# Patient Record
Sex: FEMALE | Race: WHITE | NOT HISPANIC OR LATINO | Employment: UNEMPLOYED | URBAN - METROPOLITAN AREA
[De-identification: names, ages, dates, MRNs, and addresses within clinical notes are randomized per-mention and may not be internally consistent; named-entity substitution may affect disease eponyms.]

---

## 2023-03-13 ENCOUNTER — NURSING HOME VISIT (OUTPATIENT)
Dept: POST ACUTE CARE | Facility: EXTERNAL LOCATION | Age: 68
End: 2023-03-13
Payer: MEDICARE

## 2023-03-13 DIAGNOSIS — I95.1 ORTHOSTATIC HYPOTENSION: ICD-10-CM

## 2023-03-13 DIAGNOSIS — M15.9 OSTEOARTHRITIS OF MULTIPLE JOINTS, UNSPECIFIED OSTEOARTHRITIS TYPE: ICD-10-CM

## 2023-03-13 DIAGNOSIS — R53.81 PHYSICAL DECONDITIONING: ICD-10-CM

## 2023-03-13 DIAGNOSIS — K21.9 GASTROESOPHAGEAL REFLUX DISEASE WITHOUT ESOPHAGITIS: ICD-10-CM

## 2023-03-13 DIAGNOSIS — E11.49 OTHER DIABETIC NEUROLOGICAL COMPLICATION ASSOCIATED WITH TYPE 2 DIABETES MELLITUS (MULTI): Primary | ICD-10-CM

## 2023-03-13 DIAGNOSIS — E11.69 TYPE 2 DIABETES MELLITUS WITH OTHER SPECIFIED COMPLICATION, WITH LONG-TERM CURRENT USE OF INSULIN (MULTI): ICD-10-CM

## 2023-03-13 DIAGNOSIS — Z79.4 TYPE 2 DIABETES MELLITUS WITH OTHER SPECIFIED COMPLICATION, WITH LONG-TERM CURRENT USE OF INSULIN (MULTI): ICD-10-CM

## 2023-03-13 DIAGNOSIS — E78.00 HYPERCHOLESTEREMIA: ICD-10-CM

## 2023-03-13 DIAGNOSIS — R42 DIZZINESS: ICD-10-CM

## 2023-03-13 DIAGNOSIS — F32.A DEPRESSION, UNSPECIFIED DEPRESSION TYPE: ICD-10-CM

## 2023-03-13 DIAGNOSIS — F41.9 ANXIETY: ICD-10-CM

## 2023-03-13 DIAGNOSIS — Z86.73 HISTORY OF CVA (CEREBROVASCULAR ACCIDENT): ICD-10-CM

## 2023-03-13 DIAGNOSIS — E87.6 HYPOKALEMIA: ICD-10-CM

## 2023-03-13 DIAGNOSIS — J45.20 INTERMITTENT ASTHMA, UNSPECIFIED ASTHMA SEVERITY, UNSPECIFIED WHETHER COMPLICATED (HHS-HCC): ICD-10-CM

## 2023-03-13 PROCEDURE — 99310 SBSQ NF CARE HIGH MDM 45: CPT | Performed by: NURSE PRACTITIONER

## 2023-03-13 NOTE — LETTER
"Patient: Lenora Ames  : 1955    Encounter Date: 2023    Chief Complaint:   SNF follow-up  Generalized weakness/impaired ambulation 2/2 dizziness    HPI:   67-year-old female presenting to [facility] ER on 3/5 with c/o dizziness x 4 days. Patient reported generalized weakness and inability to walk 2/2 dizziness. She has a hx of vertigo and has been taking her meclizine without significant relief. Work-up in ER: /80, , RR 18, T 36.4, SpO2 94% on RA, WBC 14.9, Hgb 16.7, Hematocrit 49.3, Na+ 131, K+ 3.6, BUN 28, Cr 0.79, UA negative for UTI, troponin 14 >>12, COVID/Flu negative, BNP 27, CXR negative, CTH showing infarct in the L frontal and L occipital lobes (new from 21), but otherwise chronic appearing. Patient was given Meclizine, Zofran, and IV NS bolus. She was admitted to the hospital for further evaluation and treatment. Hospital course:       Dizziness with orthostatic hypotension-IVF, orthostatic BP BID, MRI/MRA negative for acute stroke, ECHO w/ normal LVSF with an EF of 60-65% w/ impaired relaxation pattern for LVDF, c/w aspirin and atorvastatin, zofran prn, meclizine scheduled, c/w midodrine, ENT consult, cardio consult  Physical deconditioning 2/2 #1-PT/OT eval, recommending SNF  Hypokalemia-monitored  DM2 w/ hyperglycemia-accuchecks, mild ISS, lantus, HgbA1C 10.3  Hyponatremia 2/2 hyperglycemia-IVF, monitored  Depression/anxiety/mood disorder-c/w depakote, duloxetine, and lexapro  GERD-c/w PPI and carafate    Patient was HDS and discharged to [facility] on 3/9/23. Patient was transferred to the ER on 3/11 after she fell onto her R arm and had c/o right arm pain. Work-up in ER was negative for fracture and she was discharged back to [place]. Today, patient reports that she is feeling \"okay\". She reports RUE pain s/p fall, improved w/ Tylenol. She reports dizziness when she stands up. She denies any vision changes, SOB, cough, or chest pain. She reports appetite at " baseline. Staff report no clinical concerns.     ROS:    As above in HPI. Otherwise, all other systems have been reviewed and are negative for complaint.    Medications reviewed and verified in NH chart.     Patient Active Problem List   Diagnosis   • Bronchial asthma   • Cervical dysphagia   • Depression   • Diabetes mellitus (CMS/HCC)   • Diabetic neuropathy (CMS/HCC)   • Hypercholesteremia   • Mild obesity   • Rotator cuff syndrome of right shoulder   • Other constipation   • Osteoarthritis of multiple joints   • Chronic neck pain   • Dizziness   • History of CVA (cerebrovascular accident)   • Orthostatic hypotension   • Diastolic dysfunction   • Hypokalemia   • Hyponatremia   • Anxiety   • Gastroesophageal reflux disease without esophagitis        Past Medical History:   Diagnosis Date   • Esophagitis    • Gastroenteritis    • Other conditions influencing health status     Disc herniation   • Pneumonia 03/25/2023   • Proteinuria, unspecified     Proteinuria   • Yeast infection 03/25/2023       Past Surgical History:   Procedure Laterality Date   • MR HEAD ANGIO WO IV CONTRAST  03/11/2014    MR HEAD ANGIO WO IV CONTRAST 3/11/2014 [facility]   • MR HEAD ANGIO WO IV CONTRAST  04/19/2021    MR HEAD ANGIO WO IV CONTRAST 4/19/2021 [facility]   • MR HEAD ANGIO WO IV CONTRAST  03/06/2023    MR HEAD ANGIO WO IV CONTRAST GEN MRI   • MR NECK ANGIO WO IV CONTRAST  03/11/2014    MR NECK ANGIO WO IV CONTRAST 3/11/2014 [facility]   • MR NECK ANGIO WO IV CONTRAST  04/19/2021    MR NECK ANGIO WO IV CONTRAST 4/19/2021 [facility]   • MR NECK ANGIO WO IV CONTRAST  03/06/2023    MR NECK ANGIO WO IV CONTRAST GEN MRI   • OTHER SURGICAL HISTORY      excision of multiple cysts       Family History   Problem Relation Name Age of Onset   • Aneurysm Mother     • Lung cancer Father     • Ovarian cancer Other         Social History     Tobacco Use   Smoking Status Every Day   • Packs/day: 0.50   • Types:  Cigarettes   Smokeless Tobacco Never   Vaping Use   Vaping Status Not on file       Social History     Substance and Sexual Activity   Alcohol Use Yes    Comment: occasional       Social History     Substance and Sexual Activity   Drug Use Yes   • Types: Marijuana    Comment: daily use       Allergies   Allergen Reactions   • Rofecoxib Unknown        Vital Signs:   141/68-93-18-98.1-97% on RA     Physical Exam:  General: Sitting up in bed in NAD, alert   Head/Face: NCAT, symmetrical  Eyes: PERRLA, no injection, no discharge  ENT: Hearing not impaired, ears without scars or lesions, nasal mucosa and turbinates pink, septum midline, lips pink and moist  Neck: Supple, symmetrical  Respiratory: CTA without adventitious sounds, even and nonlabored without use of accessory muscles, good air exchange  Cardio: RRR without murmur or gallops, normal S1S2, no edema, pedal pulses 3+/4 bilaterally  Chest/Breast: Symmetrical  GI: BS x 4, normoactive, non-distended, abd round and soft, no masses or tenderness  : No suprapubic tenderness or distention  MSK: Gait not assessed, joints with full ROM without pain or contractures  Skin: Skin warm and dry, no induration  Neurologic: Cranial nerves II through XII intact, superficial touch and pain sensation intact  Psychiatric: Alert to person, place, and time, calm and cooperative, can be impulsive at times    Results/Data:   Lab Results   Component Value Date    WBC 8.1 03/09/2023    HGB 12.5 03/09/2023    HCT 37.8 03/09/2023     03/09/2023    CHOL 118 03/06/2023    TRIG 301 (H) 03/06/2023    HDL 34.0 (A) 03/06/2023    ALT 12 03/05/2023    AST 32 03/05/2023     03/09/2023    K 4.2 03/09/2023     03/09/2023    CREATININE 0.66 03/09/2023    BUN 8 03/09/2023    CO2 29 03/09/2023    INR 1.0 03/05/2023    HGBA1C 10.3 (A) 03/06/2023     Assessment/Plan:    1.  Dizziness/history of vertigo/orthostatic hypotension-encourage p.o. intake, continue with midodrine, hold for SBP  greater than 120, monitor BP and HR closely, meclizine prn, patient will need to have a hearing test scheduled as well as a VNG and ECOG, patient to follow-up with ENT (Dr. Jefferson) after testing is completed  2.  Physical deconditioning-continue with PT/OT, safety and fall precautions, ambulate with assistance only  3. Hypokalemia-monitor K+ level, replete as needed  4. GERD-c/w protonix and carafate  5. Hyponatremia-monitor BMP  6. Depression/anxiety/mood disorder-c/w depakote, duloxetine, and lexapro, monitor behaviors, supportive care, Psych to follow  7. DM2-LCS diet, accuchecks, c/w humalog s/s with meals, glargine and trulicty, monitor HgbA1C, diabetic foot care, yearly eye exam, BP and lipid control  8. Diabetic neuropathy-c/w gabapentin  9. HLD/Hx CVA-c/w aspirin and atorvastatin, monitor lipid panel and LFTs  10. RUE pain/OA-Tylenol arthritis 650 mg PO BID, encourage ROM, c/w flexeril  11. Hx asthma-albuterol prn    Orders:  1.  Hold midodrine for SBP greater than 120  2.  Schedule Tylenol arthritis 650 mg PO BID     Code Status:   Full code    Time spent reviewing patient's chart on the unit (PMH, PSH, FH, Social Hx AND progress and/or consult notes, labs, and radiology results): 25 minutes  Time spent interviewing and/or examining the patient: 10 minutes  Time spent writing note on the unit: 6 minutes  Time spent reviewing POC w/ patient, family, and/or staff: 6 minutes   Total visit time: 47 minutes . Greater than 50% of time was spent on counseling and/or coordination of care of the patient. Start time: 1:08 PM , End time: 1:55 PM.                Electronically Signed By: LINK Amaral   3/25/23  1:36 PM

## 2023-03-25 PROBLEM — R13.19 CERVICAL DYSPHAGIA: Status: ACTIVE | Noted: 2023-03-25

## 2023-03-25 PROBLEM — M54.2 CHRONIC NECK PAIN: Status: ACTIVE | Noted: 2023-03-25

## 2023-03-25 PROBLEM — K21.9 GASTROESOPHAGEAL REFLUX DISEASE WITHOUT ESOPHAGITIS: Status: ACTIVE | Noted: 2023-03-25

## 2023-03-25 PROBLEM — E87.6 HYPOKALEMIA: Status: ACTIVE | Noted: 2023-03-25

## 2023-03-25 PROBLEM — I51.89 DIASTOLIC DYSFUNCTION: Status: ACTIVE | Noted: 2023-03-25

## 2023-03-25 PROBLEM — F32.A DEPRESSION: Status: ACTIVE | Noted: 2023-03-25

## 2023-03-25 PROBLEM — K59.09 OTHER CONSTIPATION: Status: ACTIVE | Noted: 2023-03-25

## 2023-03-25 PROBLEM — R42 DIZZINESS: Status: ACTIVE | Noted: 2023-03-25

## 2023-03-25 PROBLEM — E78.00 HYPERCHOLESTEREMIA: Status: ACTIVE | Noted: 2023-03-25

## 2023-03-25 PROBLEM — E11.40 DIABETIC NEUROPATHY (MULTI): Status: ACTIVE | Noted: 2023-03-25

## 2023-03-25 PROBLEM — I95.1 ORTHOSTATIC HYPOTENSION: Status: ACTIVE | Noted: 2023-03-25

## 2023-03-25 PROBLEM — G89.29 CHRONIC NECK PAIN: Status: ACTIVE | Noted: 2023-03-25

## 2023-03-25 PROBLEM — B37.9 YEAST INFECTION: Status: RESOLVED | Noted: 2023-03-25 | Resolved: 2023-03-25

## 2023-03-25 PROBLEM — M75.101 ROTATOR CUFF SYNDROME OF RIGHT SHOULDER: Status: ACTIVE | Noted: 2023-03-25

## 2023-03-25 PROBLEM — M15.9 OSTEOARTHRITIS OF MULTIPLE JOINTS: Status: ACTIVE | Noted: 2023-03-25

## 2023-03-25 PROBLEM — J18.9 PNEUMONIA: Status: RESOLVED | Noted: 2023-03-25 | Resolved: 2023-03-25

## 2023-03-25 PROBLEM — E11.9 DIABETES MELLITUS (MULTI): Status: ACTIVE | Noted: 2023-03-25

## 2023-03-25 PROBLEM — E66.9 MILD OBESITY: Status: ACTIVE | Noted: 2023-03-25

## 2023-03-25 PROBLEM — J45.909 BRONCHIAL ASTHMA (HHS-HCC): Status: ACTIVE | Noted: 2023-03-25

## 2023-03-25 PROBLEM — Z86.73 HISTORY OF CVA (CEREBROVASCULAR ACCIDENT): Status: ACTIVE | Noted: 2023-03-25

## 2023-03-25 PROBLEM — E87.1 HYPONATREMIA: Status: ACTIVE | Noted: 2023-03-25

## 2023-03-25 PROBLEM — F41.9 ANXIETY: Status: ACTIVE | Noted: 2023-03-25

## 2023-03-25 NOTE — PROGRESS NOTES
"Chief Complaint:   SNF follow-up  Generalized weakness/impaired ambulation 2/2 dizziness    HPI:   67-year-old female presenting to [facility] ER on 3/5 with c/o dizziness x 4 days. Patient reported generalized weakness and inability to walk 2/2 dizziness. She has a hx of vertigo and has been taking her meclizine without significant relief. Work-up in ER: /80, , RR 18, T 36.4, SpO2 94% on RA, WBC 14.9, Hgb 16.7, Hematocrit 49.3, Na+ 131, K+ 3.6, BUN 28, Cr 0.79, UA negative for UTI, troponin 14 >>12, COVID/Flu negative, BNP 27, CXR negative, CTH showing infarct in the L frontal and L occipital lobes (new from 4/7/21), but otherwise chronic appearing. Patient was given Meclizine, Zofran, and IV NS bolus. She was admitted to the hospital for further evaluation and treatment. Hospital course:       Dizziness with orthostatic hypotension-IVF, orthostatic BP BID, MRI/MRA negative for acute stroke, ECHO w/ normal LVSF with an EF of 60-65% w/ impaired relaxation pattern for LVDF, c/w aspirin and atorvastatin, zofran prn, meclizine scheduled, c/w midodrine, ENT consult, cardio consult  Physical deconditioning 2/2 #1-PT/OT eval, recommending SNF  Hypokalemia-monitored  DM2 w/ hyperglycemia-accuchecks, mild ISS, lantus, HgbA1C 10.3  Hyponatremia 2/2 hyperglycemia-IVF, monitored  Depression/anxiety/mood disorder-c/w depakote, duloxetine, and lexapro  GERD-c/w PPI and carafate    Patient was HDS and discharged to [facility] on 3/9/23. Patient was transferred to the ER on 3/11 after she fell onto her R arm and had c/o right arm pain. Work-up in ER was negative for fracture and she was discharged back to [place]. Today, patient reports that she is feeling \"okay\". She reports RUE pain s/p fall, improved w/ Tylenol. She reports dizziness when she stands up. She denies any vision changes, SOB, cough, or chest pain. She reports appetite at baseline. Staff report no clinical concerns.     ROS:    As above in HPI. " Otherwise, all other systems have been reviewed and are negative for complaint.    Medications reviewed and verified in NH chart.     Patient Active Problem List   Diagnosis    Bronchial asthma    Cervical dysphagia    Depression    Diabetes mellitus (CMS/HCC)    Diabetic neuropathy (CMS/HCC)    Hypercholesteremia    Mild obesity    Rotator cuff syndrome of right shoulder    Other constipation    Osteoarthritis of multiple joints    Chronic neck pain    Dizziness    History of CVA (cerebrovascular accident)    Orthostatic hypotension    Diastolic dysfunction    Hypokalemia    Hyponatremia    Anxiety    Gastroesophageal reflux disease without esophagitis        Past Medical History:   Diagnosis Date    Esophagitis     Gastroenteritis     Other conditions influencing health status     Disc herniation    Pneumonia 03/25/2023    Proteinuria, unspecified     Proteinuria    Yeast infection 03/25/2023       Past Surgical History:   Procedure Laterality Date    MR HEAD ANGIO WO IV CONTRAST  03/11/2014    MR HEAD ANGIO WO IV CONTRAST 3/11/2014 [facility]    MR HEAD ANGIO WO IV CONTRAST  04/19/2021    MR HEAD ANGIO WO IV CONTRAST 4/19/2021 [facility]    MR HEAD ANGIO WO IV CONTRAST  03/06/2023    MR HEAD ANGIO WO IV CONTRAST GEN MRI    MR NECK ANGIO WO IV CONTRAST  03/11/2014    MR NECK ANGIO WO IV CONTRAST 3/11/2014 [facility]    MR NECK ANGIO WO IV CONTRAST  04/19/2021    MR NECK ANGIO WO IV CONTRAST 4/19/2021 [facility]    MR NECK ANGIO WO IV CONTRAST  03/06/2023    MR NECK ANGIO WO IV CONTRAST GEN MRI    OTHER SURGICAL HISTORY      excision of multiple cysts       Family History   Problem Relation Name Age of Onset    Aneurysm Mother      Lung cancer Father      Ovarian cancer Other         Social History     Tobacco Use   Smoking Status Every Day    Packs/day: 0.50    Types: Cigarettes   Smokeless Tobacco Never   Vaping Use   Vaping Status Not on file       Social History     Substance and  Sexual Activity   Alcohol Use Yes    Comment: occasional       Social History     Substance and Sexual Activity   Drug Use Yes    Types: Marijuana    Comment: daily use       Allergies   Allergen Reactions    Rofecoxib Unknown        Vital Signs:   141/68-93-18-98.1-97% on RA     Physical Exam:  General: Sitting up in bed in NAD, alert   Head/Face: NCAT, symmetrical  Eyes: PERRLA, no injection, no discharge  ENT: Hearing not impaired, ears without scars or lesions, nasal mucosa and turbinates pink, septum midline, lips pink and moist  Neck: Supple, symmetrical  Respiratory: CTA without adventitious sounds, even and nonlabored without use of accessory muscles, good air exchange  Cardio: RRR without murmur or gallops, normal S1S2, no edema, pedal pulses 3+/4 bilaterally  Chest/Breast: Symmetrical  GI: BS x 4, normoactive, non-distended, abd round and soft, no masses or tenderness  : No suprapubic tenderness or distention  MSK: Gait not assessed, joints with full ROM without pain or contractures  Skin: Skin warm and dry, no induration  Neurologic: Cranial nerves II through XII intact, superficial touch and pain sensation intact  Psychiatric: Alert to person, place, and time, calm and cooperative, can be impulsive at times    Results/Data:   Lab Results   Component Value Date    WBC 8.1 03/09/2023    HGB 12.5 03/09/2023    HCT 37.8 03/09/2023     03/09/2023    CHOL 118 03/06/2023    TRIG 301 (H) 03/06/2023    HDL 34.0 (A) 03/06/2023    ALT 12 03/05/2023    AST 32 03/05/2023     03/09/2023    K 4.2 03/09/2023     03/09/2023    CREATININE 0.66 03/09/2023    BUN 8 03/09/2023    CO2 29 03/09/2023    INR 1.0 03/05/2023    HGBA1C 10.3 (A) 03/06/2023     Assessment/Plan:    1.  Dizziness/history of vertigo/orthostatic hypotension-encourage p.o. intake, continue with midodrine, hold for SBP greater than 120, monitor BP and HR closely, meclizine prn, patient will need to have a hearing test scheduled as well  as a VNG and ECOG, patient to follow-up with ENT (Dr. Jefferson) after testing is completed  2.  Physical deconditioning-continue with PT/OT, safety and fall precautions, ambulate with assistance only  3. Hypokalemia-monitor K+ level, replete as needed  4. GERD-c/w protonix and carafate  5. Hyponatremia-monitor BMP  6. Depression/anxiety/mood disorder-c/w depakote, duloxetine, and lexapro, monitor behaviors, supportive care, Psych to follow  7. DM2-LCS diet, accuchecks, c/w humalog s/s with meals, glargine and trulicty, monitor HgbA1C, diabetic foot care, yearly eye exam, BP and lipid control  8. Diabetic neuropathy-c/w gabapentin  9. HLD/Hx CVA-c/w aspirin and atorvastatin, monitor lipid panel and LFTs  10. RUE pain/OA-Tylenol arthritis 650 mg PO BID, encourage ROM, c/w flexeril  11. Hx asthma-albuterol prn    Orders:  1.  Hold midodrine for SBP greater than 120  2.  Schedule Tylenol arthritis 650 mg PO BID     Code Status:   Full code    Time spent reviewing patient's chart on the unit (PMH, PSH, FH, Social Hx AND progress and/or consult notes, labs, and radiology results): 25 minutes  Time spent interviewing and/or examining the patient: 10 minutes  Time spent writing note on the unit: 6 minutes  Time spent reviewing POC w/ patient, family, and/or staff: 6 minutes   Total visit time: 47 minutes . Greater than 50% of time was spent on counseling and/or coordination of care of the patient. Start time: 1:08 PM , End time: 1:55 PM.

## 2023-04-03 ENCOUNTER — NURSING HOME VISIT (OUTPATIENT)
Dept: POST ACUTE CARE | Facility: EXTERNAL LOCATION | Age: 68
End: 2023-04-03
Payer: MEDICAID

## 2023-04-03 DIAGNOSIS — F41.9 ANXIETY AND DEPRESSION: ICD-10-CM

## 2023-04-03 DIAGNOSIS — E11.59 TYPE 2 DIABETES MELLITUS WITH OTHER CIRCULATORY COMPLICATION, WITH LONG-TERM CURRENT USE OF INSULIN (MULTI): ICD-10-CM

## 2023-04-03 DIAGNOSIS — E87.1 HYPONATREMIA: ICD-10-CM

## 2023-04-03 DIAGNOSIS — K21.9 GASTROESOPHAGEAL REFLUX DISEASE WITHOUT ESOPHAGITIS: ICD-10-CM

## 2023-04-03 DIAGNOSIS — E87.6 HYPOKALEMIA: ICD-10-CM

## 2023-04-03 DIAGNOSIS — Z79.4 TYPE 2 DIABETES MELLITUS WITH OTHER CIRCULATORY COMPLICATION, WITH LONG-TERM CURRENT USE OF INSULIN (MULTI): ICD-10-CM

## 2023-04-03 DIAGNOSIS — K52.9 ACUTE GASTROENTERITIS: Primary | ICD-10-CM

## 2023-04-03 DIAGNOSIS — M15.9 OSTEOARTHRITIS OF MULTIPLE JOINTS, UNSPECIFIED OSTEOARTHRITIS TYPE: ICD-10-CM

## 2023-04-03 DIAGNOSIS — R42 DIZZINESS: ICD-10-CM

## 2023-04-03 DIAGNOSIS — I65.22 CAROTID STENOSIS, LEFT: ICD-10-CM

## 2023-04-03 DIAGNOSIS — F32.A ANXIETY AND DEPRESSION: ICD-10-CM

## 2023-04-03 DIAGNOSIS — I95.1 ORTHOSTATIC HYPOTENSION: ICD-10-CM

## 2023-04-03 PROCEDURE — 99309 SBSQ NF CARE MODERATE MDM 30: CPT | Performed by: NURSE PRACTITIONER

## 2023-04-03 NOTE — LETTER
Patient: Lenora Ames  : 1955    Encounter Date: 2023    Chief Complaint:   SNF follow-up  Generalized weakness/impaired ambulation 2/2 dizziness    HPI:   67-year-old female presenting to [facility] ER on 3/5 with c/o dizziness x 4 days. Patient reported generalized weakness and inability to walk 2/2 dizziness. She has a hx of vertigo and has been taking her meclizine without significant relief. Work-up in ER: /80, , RR 18, T 36.4, SpO2 94% on RA, WBC 14.9, Hgb 16.7, Hematocrit 49.3, Na+ 131, K+ 3.6, BUN 28, Cr 0.79, UA negative for UTI, troponin 14 >>12, COVID/Flu negative, BNP 27, CXR negative, CTH showing infarct in the L frontal and L occipital lobes (new from 21), but otherwise chronic appearing. Patient was given Meclizine, Zofran, and IV NS bolus. She was admitted to the hospital for further evaluation and treatment. Hospital course:       Dizziness with orthostatic hypotension-IVF, orthostatic BP BID, MRI/MRA negative for acute stroke, ECHO w/ normal LVSF with an EF of 60-65% w/ impaired relaxation pattern for LVDF, c/w aspirin and atorvastatin, zofran prn, meclizine scheduled, c/w midodrine, ENT consult, cardio consult  Physical deconditioning 2/2 #1-PT/OT eval, recommending SNF  Hypokalemia-monitored  DM2 w/ hyperglycemia-accuchecks, mild ISS, lantus, HgbA1C 10.3  Hyponatremia 2/2 hyperglycemia-IVF, monitored  Depression/anxiety/mood disorder-c/w depakote, duloxetine, and lexapro  GERD-c/w PPI and carafate    Patient was HDS and discharged to [facility] on 3/9/23. Patient was transferred to the ER on 3/11 after she fell onto her R arm and had c/o right arm pain. Work-up in ER was negative for fracture and she was discharged back to [place]. Today, patient is c/o decreased appetite, nausea, diarrhea, and fatigue x 2-3 days. She has had + sick contacts. She denies any fevers, chills, SOB, cough, or chest pain. Staff report no clinical concerns.     ROS:    As above in  HPI. Otherwise, all other systems have been reviewed and are negative for complaint.    Medications reviewed and verified in NH chart.     Patient Active Problem List   Diagnosis   • Bronchial asthma   • Cervical dysphagia   • Anxiety and depression   • Diabetes mellitus (CMS/HCC)   • Diabetic neuropathy (CMS/HCC)   • Hypercholesteremia   • Mild obesity   • Rotator cuff syndrome of right shoulder   • Other constipation   • Osteoarthritis of multiple joints   • Chronic neck pain   • Dizziness   • History of CVA (cerebrovascular accident)   • Orthostatic hypotension   • Diastolic dysfunction   • Hypokalemia   • Hyponatremia   • Anxiety   • Gastroesophageal reflux disease without esophagitis   • Carotid stenosis, left   • Healthcare maintenance        Past Medical History:   Diagnosis Date   • Esophagitis    • Gastroenteritis    • Other conditions influencing health status     Disc herniation   • Pneumonia 03/25/2023   • Proteinuria, unspecified     Proteinuria   • Yeast infection 03/25/2023       Past Surgical History:   Procedure Laterality Date   • MR HEAD ANGIO WO IV CONTRAST  03/11/2014    MR HEAD ANGIO WO IV CONTRAST 3/11/2014 [facility]   • MR HEAD ANGIO WO IV CONTRAST  04/19/2021    MR HEAD ANGIO WO IV CONTRAST 4/19/2021 [facility]   • MR HEAD ANGIO WO IV CONTRAST  03/06/2023    MR HEAD ANGIO WO IV CONTRAST GEN MRI   • MR NECK ANGIO WO IV CONTRAST  03/11/2014    MR NECK ANGIO WO IV CONTRAST 3/11/2014 [facility]   • MR NECK ANGIO WO IV CONTRAST  04/19/2021    MR NECK ANGIO WO IV CONTRAST 4/19/2021 [facility]   • MR NECK ANGIO WO IV CONTRAST  03/06/2023    MR NECK ANGIO WO IV CONTRAST GEN MRI   • OTHER SURGICAL HISTORY      excision of multiple cysts       Family History   Problem Relation Name Age of Onset   • Aneurysm Mother     • Lung cancer Father     • Ovarian cancer Other         Social History     Tobacco Use   Smoking Status Every Day   • Packs/day: 0.50   • Types: Cigarettes    Smokeless Tobacco Never   Vaping Use   Vaping Status Not on file       Social History     Substance and Sexual Activity   Alcohol Use Yes    Comment: occasional       Social History     Substance and Sexual Activity   Drug Use Yes   • Types: Marijuana    Comment: daily use       Allergies   Allergen Reactions   • Rofecoxib Unknown        Vital Signs:   94/80-80-19-98.2-96% on RA     Physical Exam:  General: Lying in bed in NAD, fatigued but arouses with verbal stimulation   Head/Face: NCAT, symmetrical  Eyes: PERRLA, no injection, no discharge  ENT: Hearing not impaired, ears without scars or lesions, nasal mucosa and turbinates pink, septum midline, lips pink and moist  Neck: Supple, symmetrical  Respiratory: CTA without adventitious sounds, even and nonlabored without use of accessory muscles, good air exchange  Cardio: RRR without murmur or gallops, normal S1S2, no edema, pedal pulses 3+/4 bilaterally  Chest/Breast: Symmetrical  GI: BS x 4, normoactive, non-distended, abd round and soft, no masses or tenderness  : No suprapubic tenderness or distention  MSK: Gait not assessed, joints with full ROM without pain or contractures  Skin: Skin warm and dry, no induration  Neurologic: Cranial nerves II through XII intact, superficial touch and pain sensation intact  Psychiatric: Alert to person, place, and time, calm and cooperative, can be impulsive at times    Results/Data:   Lab Results   Component Value Date    WBC 8.1 03/09/2023    HGB 12.5 03/09/2023    HCT 37.8 03/09/2023     03/09/2023    CHOL 118 03/06/2023    TRIG 301 (H) 03/06/2023    HDL 34.0 (A) 03/06/2023    ALT 12 03/05/2023    AST 32 03/05/2023     03/09/2023    K 4.2 03/09/2023     03/09/2023    CREATININE 0.66 03/09/2023    BUN 8 03/09/2023    CO2 29 03/09/2023    INR 1.0 03/05/2023    HGBA1C 10.3 (A) 03/06/2023     Assessment/Plan:    1.  Dizziness/history of vertigo/orthostatic hypotension-encourage p.o. intake, continue with  midodrine, hold for SBP greater than 120, monitor BP and HR closely, meclizine prn, patient will need to have a hearing test scheduled as well as a VNG and ECOG, patient to follow-up with ENT (Dr. Jefferson) after testing is completed  2.  Physical deconditioning-continue with PT/OT, safety and fall precautions, ambulate with assistance only  3. Hypokalemia-monitor K+ level, replete as needed  4. GERD-c/w protonix and carafate  5. Hyponatremia-monitor BMP  6. Depression/anxiety/mood disorder/insomnia-c/w depakote, duloxetine, lexapro, and melatonin, monitor behaviors, supportive care, Psych to follow  7. DM2-LCS diet, accuchecks, c/w humalog s/s with meals, glargine, and trulicty, monitor HgbA1C, diabetic foot care, yearly eye exam, BP and lipid control  8. Diabetic neuropathy-c/w gabapentin  9. HLD/Hx CVA-c/w aspirin and atorvastatin, monitor lipid panel and LFTs  10. RUE pain/OA-Tylenol arthritis 650 mg PO BID and flexeril, encourage ROM  11. Hx asthma-albuterol prn  12. Gastroenteritis-zofran prn for N/V, imodium prn for diarrhea, encourage fluids, check CBC w/ diff and CMP in AM, monitor closely     Orders:  CBC w/ diff, CMP, and depakote level on 4/4  Imodium 1 capsule Q 6 hours prn for loose stools/diarrhea  Zofran 4 mg PO Q 6 hours prn for N/V    Code Status:   Full code                Electronically Signed By: SHAMAR Amaral-CNP   4/16/23  8:29 PM

## 2023-04-10 ENCOUNTER — NURSING HOME VISIT (OUTPATIENT)
Dept: POST ACUTE CARE | Facility: EXTERNAL LOCATION | Age: 68
End: 2023-04-10
Payer: MEDICAID

## 2023-04-10 DIAGNOSIS — G89.29 CHRONIC NECK PAIN: ICD-10-CM

## 2023-04-10 DIAGNOSIS — R42 DIZZINESS: Primary | ICD-10-CM

## 2023-04-10 DIAGNOSIS — M15.9 OSTEOARTHRITIS OF MULTIPLE JOINTS, UNSPECIFIED OSTEOARTHRITIS TYPE: ICD-10-CM

## 2023-04-10 DIAGNOSIS — I65.22 CAROTID STENOSIS, LEFT: ICD-10-CM

## 2023-04-10 DIAGNOSIS — E87.6 HYPOKALEMIA: ICD-10-CM

## 2023-04-10 DIAGNOSIS — R53.81 PHYSICAL DECONDITIONING: ICD-10-CM

## 2023-04-10 DIAGNOSIS — I95.1 ORTHOSTATIC HYPOTENSION: ICD-10-CM

## 2023-04-10 DIAGNOSIS — K21.9 GASTROESOPHAGEAL REFLUX DISEASE WITHOUT ESOPHAGITIS: ICD-10-CM

## 2023-04-10 DIAGNOSIS — E11.59 TYPE 2 DIABETES MELLITUS WITH OTHER CIRCULATORY COMPLICATION, WITH LONG-TERM CURRENT USE OF INSULIN (MULTI): ICD-10-CM

## 2023-04-10 DIAGNOSIS — M54.2 CHRONIC NECK PAIN: ICD-10-CM

## 2023-04-10 DIAGNOSIS — F41.9 ANXIETY: ICD-10-CM

## 2023-04-10 DIAGNOSIS — Z79.4 TYPE 2 DIABETES MELLITUS WITH OTHER CIRCULATORY COMPLICATION, WITH LONG-TERM CURRENT USE OF INSULIN (MULTI): ICD-10-CM

## 2023-04-10 PROCEDURE — 99316 NF DSCHRG MGMT 30 MIN+: CPT | Performed by: NURSE PRACTITIONER

## 2023-04-10 NOTE — LETTER
Patient: Lenora Ames  : 1955    Encounter Date: 04/10/2023    Chief Complaint:   SNF follow-up  Generalized weakness/impaired ambulation 2/2 dizziness    HPI:   67-year-old female presenting to [facility] ER on 3/5 with c/o dizziness x 4 days. Patient reported generalized weakness and inability to walk 2/2 dizziness. She has a hx of vertigo and has been taking her meclizine without significant relief. Work-up in ER: /80, , RR 18, T 36.4, SpO2 94% on RA, WBC 14.9, Hgb 16.7, Hematocrit 49.3, Na+ 131, K+ 3.6, BUN 28, Cr 0.79, UA negative for UTI, troponin 14 >>12, COVID/Flu negative, BNP 27, CXR negative, CTH showing infarct in the L frontal and L occipital lobes (new from 21), but otherwise chronic appearing. Patient was given Meclizine, Zofran, and IV NS bolus. She was admitted to the hospital for further evaluation and treatment. Hospital course:       Dizziness with orthostatic hypotension-IVF, orthostatic BP BID, MRI/MRA negative for acute stroke, ECHO w/ normal LVSF with an EF of 60-65% w/ impaired relaxation pattern for LVDF, c/w aspirin and atorvastatin, zofran prn, meclizine scheduled, c/w midodrine, ENT consult, cardio consult  Physical deconditioning 2/2 #1-PT/OT eval, recommending SNF  Hypokalemia-monitored  DM2 w/ hyperglycemia-accuchecks, mild ISS, lantus, HgbA1C 10.3  Hyponatremia 2/2 hyperglycemia-IVF, monitored  Depression/anxiety/mood disorder-c/w depakote, duloxetine, and lexapro  GERD-c/w PPI and carafate    Patient was HDS and discharged to [facility] on 3/9/23. Patient was transferred to the ER on 3/11 after she fell onto her R arm and had c/o right arm pain. Work-up in ER was negative for fracture and she was discharged back to [place]. Today, patient reports that she is feeling well. She continues to report dizziness when lays down, which lasts for about 1 minute. She is ambulating well without dizziness. She denies any SOB, cough, or chest pain. Staff report no  clinical concerns.     ROS:    As above in HPI. Otherwise, all other systems have been reviewed and are negative for complaint.    Medications reviewed and verified in NH chart.     Patient Active Problem List   Diagnosis   • Bronchial asthma   • Cervical dysphagia   • Anxiety and depression   • Diabetes mellitus (CMS/HCC)   • Diabetic neuropathy (CMS/HCC)   • Hypercholesteremia   • Mild obesity   • Rotator cuff syndrome of right shoulder   • Other constipation   • Osteoarthritis of multiple joints   • Chronic neck pain   • Dizziness   • History of CVA (cerebrovascular accident)   • Orthostatic hypotension   • Diastolic dysfunction   • Hypokalemia   • Hyponatremia   • Anxiety   • Gastroesophageal reflux disease without esophagitis   • Carotid stenosis, left   • Healthcare maintenance        Past Medical History:   Diagnosis Date   • Esophagitis    • Gastroenteritis    • Other conditions influencing health status     Disc herniation   • Pneumonia 03/25/2023   • Proteinuria, unspecified     Proteinuria   • Yeast infection 03/25/2023       Past Surgical History:   Procedure Laterality Date   • MR HEAD ANGIO WO IV CONTRAST  03/11/2014    MR HEAD ANGIO WO IV CONTRAST 3/11/2014 [facility]   • MR HEAD ANGIO WO IV CONTRAST  04/19/2021    MR HEAD ANGIO WO IV CONTRAST 4/19/2021 [facility]   • MR HEAD ANGIO WO IV CONTRAST  03/06/2023    MR HEAD ANGIO WO IV CONTRAST GEN MRI   • MR NECK ANGIO WO IV CONTRAST  03/11/2014    MR NECK ANGIO WO IV CONTRAST 3/11/2014 [facility]   • MR NECK ANGIO WO IV CONTRAST  04/19/2021    MR NECK ANGIO WO IV CONTRAST 4/19/2021 [facility]   • MR NECK ANGIO WO IV CONTRAST  03/06/2023    MR NECK ANGIO WO IV CONTRAST GEN MRI   • OTHER SURGICAL HISTORY      excision of multiple cysts       Family History   Problem Relation Name Age of Onset   • Aneurysm Mother     • Lung cancer Father     • Ovarian cancer Other         Social History     Tobacco Use   Smoking Status Every Day    • Packs/day: 0.50   • Types: Cigarettes   Smokeless Tobacco Never   Vaping Use   Vaping Status Not on file       Social History     Substance and Sexual Activity   Alcohol Use Yes    Comment: occasional       Social History     Substance and Sexual Activity   Drug Use Yes   • Types: Marijuana    Comment: daily use       Allergies   Allergen Reactions   • Rofecoxib Unknown        Vital Signs:   97/67-82-18-98.0-96% on RA     Physical Exam:  General: Sitting up in bed in NAD, alert  Head/Face: NCAT, symmetrical  Eyes: PERRLA, no injection, no discharge  ENT: Hearing not impaired, ears without scars or lesions, nasal mucosa and turbinates pink, septum midline, lips pink and moist  Neck: Supple, symmetrical  Respiratory: CTA without adventitious sounds, even and nonlabored without use of accessory muscles, good air exchange  Cardio: RRR without murmur or gallops, normal S1S2, no edema, pedal pulses 3+/4 bilaterally  Chest/Breast: Symmetrical  GI: BS x 4, normoactive, non-distended, abd round and soft, no masses or tenderness  : No suprapubic tenderness or distention  MSK: Gait not assessed, joints with full ROM without pain or contractures  Skin: Skin warm and dry, no induration  Neurologic: Cranial nerves II through XII intact, superficial touch and pain sensation intact  Psychiatric: Alert to person, place, and time, calm and cooperative, can be impulsive at times    Results/Data:   4/4/23: Glucose 160, VPA 14, CBC WNL  Lab Results   Component Value Date    WBC 8.1 03/09/2023    HGB 12.5 03/09/2023    HCT 37.8 03/09/2023     03/09/2023    CHOL 118 03/06/2023    TRIG 301 (H) 03/06/2023    HDL 34.0 (A) 03/06/2023    ALT 12 03/05/2023    AST 32 03/05/2023     03/09/2023    K 4.2 03/09/2023     03/09/2023    CREATININE 0.66 03/09/2023    BUN 8 03/09/2023    CO2 29 03/09/2023    INR 1.0 03/05/2023    HGBA1C 10.3 (A) 03/06/2023     Assessment/Plan:    1.  Dizziness/history of vertigo/orthostatic  hypotension-encourage p.o. intake, continue with midodrine, hold for SBP greater than 120, monitor BP and HR closely, meclizine prn, patient scheduled to have a hearing test and a VNG and ECOG, patient to follow-up with ENT (Dr. Jefferson) after testing is completed  2.  Physical deconditioning-continue with PT/OT, safety and fall precautions, ambulate with assistance only  3. Hypokalemia-monitor K+ level, replete as needed  4. GERD-c/w protonix and carafate  5. Hyponatremia-monitor BMP  6. Depression/anxiety/mood disorder/insomnia-c/w depakote, duloxetine, lexapro, and melatonin, monitor behaviors, supportive care, Psych to follow  7. DM2-LCS diet, accuchecks, c/w humalog s/s with meals, glargine, and trulicty, monitor HgbA1C, diabetic foot care, yearly eye exam, BP and lipid control  8. Diabetic neuropathy-c/w gabapentin  9. HLD/Hx CVA-c/w aspirin and atorvastatin, monitor lipid panel and LFTs  10. RUE pain/OA-Tylenol arthritis 650 mg PO BID and flexeril, encourage ROM  11. Hx asthma-albuterol prn  12. Gastroenteritis-RESOLVED    Orders:  NNO    Code Status:   Full code    Total cumulative time spent in preparation of this discharge, including documentation review, coordination of care with the medical team including PT/OT/SW/treating consultants, discussion with patient and/or pertinent family members, finalization of prescriptions, F/U appointments, and this discharge summary was approximately 40 minutes.                  Electronically Signed By: LINK Amaral   4/25/23 10:32 PM

## 2023-04-14 ENCOUNTER — OFFICE VISIT (OUTPATIENT)
Dept: PRIMARY CARE | Facility: CLINIC | Age: 68
End: 2023-04-14
Payer: MEDICAID

## 2023-04-14 VITALS
DIASTOLIC BLOOD PRESSURE: 60 MMHG | HEART RATE: 110 BPM | BODY MASS INDEX: 28.99 KG/M2 | WEIGHT: 169.8 LBS | RESPIRATION RATE: 18 BRPM | SYSTOLIC BLOOD PRESSURE: 92 MMHG | HEIGHT: 64 IN | OXYGEN SATURATION: 91 %

## 2023-04-14 DIAGNOSIS — Z00.00 HEALTHCARE MAINTENANCE: ICD-10-CM

## 2023-04-14 DIAGNOSIS — I65.22 CAROTID STENOSIS, LEFT: ICD-10-CM

## 2023-04-14 DIAGNOSIS — F41.9 ANXIETY AND DEPRESSION: ICD-10-CM

## 2023-04-14 DIAGNOSIS — F32.A ANXIETY AND DEPRESSION: ICD-10-CM

## 2023-04-14 DIAGNOSIS — E11.59 TYPE 2 DIABETES MELLITUS WITH OTHER CIRCULATORY COMPLICATION, WITH LONG-TERM CURRENT USE OF INSULIN (MULTI): ICD-10-CM

## 2023-04-14 DIAGNOSIS — Z79.4 TYPE 2 DIABETES MELLITUS WITH OTHER CIRCULATORY COMPLICATION, WITH LONG-TERM CURRENT USE OF INSULIN (MULTI): ICD-10-CM

## 2023-04-14 PROCEDURE — 1159F MED LIST DOCD IN RCRD: CPT | Performed by: INTERNAL MEDICINE

## 2023-04-14 PROCEDURE — 3074F SYST BP LT 130 MM HG: CPT | Performed by: INTERNAL MEDICINE

## 2023-04-14 PROCEDURE — 3046F HEMOGLOBIN A1C LEVEL >9.0%: CPT | Performed by: INTERNAL MEDICINE

## 2023-04-14 PROCEDURE — 3078F DIAST BP <80 MM HG: CPT | Performed by: INTERNAL MEDICINE

## 2023-04-14 PROCEDURE — 99204 OFFICE O/P NEW MOD 45 MIN: CPT | Performed by: INTERNAL MEDICINE

## 2023-04-14 RX ORDER — METFORMIN HYDROCHLORIDE 500 MG/1
1000 TABLET, EXTENDED RELEASE ORAL
COMMUNITY
Start: 2023-02-08 | End: 2023-06-06 | Stop reason: ALTCHOICE

## 2023-04-14 RX ORDER — ALBUTEROL SULFATE 90 UG/1
AEROSOL, METERED RESPIRATORY (INHALATION)
COMMUNITY
End: 2024-01-26

## 2023-04-14 RX ORDER — DULOXETIN HYDROCHLORIDE 60 MG/1
60 CAPSULE, DELAYED RELEASE ORAL DAILY
COMMUNITY
End: 2023-06-20

## 2023-04-14 RX ORDER — INSULIN GLARGINE 100 [IU]/ML
INJECTION, SOLUTION SUBCUTANEOUS
COMMUNITY
Start: 2022-06-13 | End: 2023-06-06 | Stop reason: SDUPTHER

## 2023-04-14 RX ORDER — IPRATROPIUM BROMIDE AND ALBUTEROL SULFATE 2.5; .5 MG/3ML; MG/3ML
SOLUTION RESPIRATORY (INHALATION)
COMMUNITY
Start: 2022-06-30 | End: 2023-08-16 | Stop reason: ALTCHOICE

## 2023-04-14 RX ORDER — ESCITALOPRAM OXALATE 10 MG/1
10 TABLET ORAL DAILY
COMMUNITY
End: 2023-09-25

## 2023-04-14 RX ORDER — PANTOPRAZOLE SODIUM 40 MG/1
40 TABLET, DELAYED RELEASE ORAL
COMMUNITY
End: 2023-06-20

## 2023-04-14 RX ORDER — DIVALPROEX SODIUM 125 MG/1
125 TABLET, DELAYED RELEASE ORAL 2 TIMES DAILY
COMMUNITY
End: 2023-06-20

## 2023-04-14 RX ORDER — CYCLOBENZAPRINE HCL 10 MG
10 TABLET ORAL 3 TIMES DAILY PRN
COMMUNITY
End: 2023-09-25

## 2023-04-14 RX ORDER — GABAPENTIN 300 MG/1
600 CAPSULE ORAL 3 TIMES DAILY
COMMUNITY
End: 2023-06-06 | Stop reason: ALTCHOICE

## 2023-04-14 RX ORDER — DULAGLUTIDE 1.5 MG/.5ML
1.5 INJECTION, SOLUTION SUBCUTANEOUS
COMMUNITY
End: 2023-06-06 | Stop reason: SDUPTHER

## 2023-04-14 RX ORDER — NAPROXEN SODIUM 220 MG/1
81 TABLET, FILM COATED ORAL DAILY
COMMUNITY

## 2023-04-14 RX ORDER — SUCRALFATE 1 G/10ML
1 SUSPENSION ORAL 4 TIMES DAILY
COMMUNITY
End: 2023-09-25 | Stop reason: SDUPTHER

## 2023-04-14 RX ORDER — MIDODRINE HYDROCHLORIDE 5 MG/1
5 TABLET ORAL 3 TIMES DAILY
COMMUNITY
End: 2023-09-25

## 2023-04-14 RX ORDER — MECLIZINE HYDROCHLORIDE 25 MG/1
25 TABLET ORAL 3 TIMES DAILY PRN
COMMUNITY
End: 2023-06-20

## 2023-04-14 RX ORDER — ATORVASTATIN CALCIUM 20 MG/1
1 TABLET, FILM COATED ORAL DAILY
COMMUNITY
Start: 2022-09-29 | End: 2023-06-06 | Stop reason: ALTCHOICE

## 2023-04-14 ASSESSMENT — ENCOUNTER SYMPTOMS
MYALGIAS: 1
DIZZINESS: 1
LIGHT-HEADEDNESS: 1
FATIGUE: 1
SPEECH DIFFICULTY: 1
WEAKNESS: 1

## 2023-04-14 ASSESSMENT — PAIN SCALES - GENERAL: PAINLEVEL: 4

## 2023-04-14 NOTE — PROGRESS NOTES
Patient ID:   Lenora Ames is a 67 y.o. female with PMH remarkable for asthma, DM2 with neuropathy, vertigo, CVA, diastolic dysfunction, anxiety, GERD who presents to the office today for Establish Care, Hospital Follow-up, and rehab stay follow up.    HEALTH MAINTENANCE: Establish Care  Smoking: Current Smoker, 0.5ppd  Mammogram (40-75): ?   Pap smear (21-65): ?  Labs: 3/9/2023  Colonoscopy (45-75): ?  Lung cancer (55-80 + 30 pack year + smoking/quit in last 15 years): CT Chest done 4/15/2021, no mention of nodules. Did show diffuse thickening of esophagus.   DEXA (65+, q 2 years): ?  ECHO: done 3/6/2023. LVSF 60-65%, impaired relaxation.  -- CAROTID US: done 3/7/2023. SEVERE left internal carotid artery stenosis (>70%) with distal flow void concerning for possible occlusion. Right carotid findings were consistent with <50% of right proximal ICA.  -- MRI/A Brain, Head, Neck done 3/6/2023 showed Chronic ischemic changes on the left are new from 2021, however there is no acute intracranial pathology. Progressive narrowing of the left cervical ICA, now with near occlusion and diminished flow in the left anterior circulation. Additional areas of intracranial atherosclerotic disease are noted as described above, no proximal large vessel occlusion in the head. Atherosclerotic disease with moderate narrowing of the proximal right ICA, no other flow-limiting stenosis or occlusion in the neck.    Recent Hospitalization and SNF stay @ [facility]:  - Pt presented to [place] ER on 3/5 with c/o dizziness x 4 days, reported generalized weakness and inability to walk 2/2 dizziness. CTH showing infarct in the L frontal and L occipital lobes (new from 4/7/21), but otherwise chronic appearing. MRI/MRA negative for acute stroke, ECHO w/ normal LVSF with an EF of 60-65% w/ impaired relaxation pattern for LVDF, c/w aspirin and atorvastatin, zofran prn, meclizine scheduled, c/w midodrine, ENT & cardio consulted. Hyponatremia imp with  IVF. HgbA1C 10.3 on 3/6/2023. Patient was discharged to [facility] on 3/9/23. Patient was then transferred to the ER on 3/11 after she fell onto her R arm and had c/o right arm pain. Work-up in ER was negative for fracture and she was discharged back to [place]. Pt was discharged from [facility] today.   - pt reports that she has to make multiple appt's. ENT, Cardio, Psych, Endocrine, Neurology.  Advised that she will need to see Vascular Surgery regarding the carotid stenosis. Will place a referral for this.    - reports that she lives with a family member and they take care of everything for her.   - has left upper extremity weakness and slight contracture of hand  - pt has PT OT in the home for strength and conditioning, just awaiting evaluation since she was just discharged today.     REVIEW OF SYSTEMS:  Review of Systems   Constitutional:  Positive for fatigue.   Musculoskeletal:  Positive for myalgias.   Neurological:  Positive for dizziness, speech difficulty, weakness and light-headedness.   All other systems reviewed and are negative.    12 point review of systems negative unless stated above in HPI    ALLERGIES:  Allergies   Allergen Reactions    Rofecoxib Unknown        VITAL SIGNS:  Vitals:    04/14/23 1026   BP: 92/60   Pulse: 110   Resp: 18   SpO2: 91%       PHYSICAL EXAM:  Physical Exam  Vitals reviewed.   Constitutional:       General: She is not in acute distress.     Appearance: Normal appearance. She is not ill-appearing.   HENT:      Head: Normocephalic and atraumatic.      Right Ear: Tympanic membrane and external ear normal.      Left Ear: Tympanic membrane and external ear normal.      Nose: Nose normal.      Mouth/Throat:      Mouth: Mucous membranes are moist.      Pharynx: Oropharynx is clear.   Eyes:      Conjunctiva/sclera: Conjunctivae normal.      Pupils: Pupils are equal, round, and reactive to light.   Cardiovascular:      Rate and Rhythm: Normal rate and regular rhythm.       Heart sounds: Normal heart sounds. No murmur heard.  Pulmonary:      Effort: Pulmonary effort is normal. No respiratory distress.      Breath sounds: Normal breath sounds. No wheezing.   Abdominal:      General: There is no distension.      Palpations: Abdomen is soft. There is no mass.      Tenderness: There is no abdominal tenderness.   Musculoskeletal:      Right hand: Deformity present. Decreased range of motion. Decreased strength.      Cervical back: Normal range of motion and neck supple.   Skin:     General: Skin is warm and dry.   Neurological:      General: No focal deficit present.      Mental Status: She is alert and oriented to person, place, and time.      Sensory: No sensory deficit.      Motor: No weakness.      Coordination: Coordination abnormal.      Gait: Gait normal.      Comments: asphasia   Psychiatric:         Mood and Affect: Mood normal.         Behavior: Behavior normal.         MEDICATIONS:  Current Outpatient Medications on File Prior to Visit   Medication Sig Dispense Refill    atorvastatin (Lipitor) 20 mg tablet Take 1 tablet (20 mg) by mouth once daily.      ipratropium-albuteroL (Duo-Neb) 0.5-2.5 mg/3 mL nebulizer solution INHALE THE CONTENTS OF 1 VIAL (3 ML) VIA NEBULIZER AS INSTRUCTED EVERY 4 HOURS AS NEEDED   INHALE OVER 5 TO 15 MINUTES      Lantus Solostar U-100 Insulin 100 unit/mL (3 mL) pen INJECT 30 UNITS SUBCUTANEOUSLY ONCE DAILY      metFORMIN XR (Glucophage-XR) 500 mg 24 hr tablet Take 2 tablets (1,000 mg) by mouth in the morning and 2 tablets (1,000 mg) in the evening. Take with meals.      aspirin 81 mg chewable tablet Chew 1 tablet (81 mg) once daily.      aspirin-caffeine 500-32.5 mg tablet Take by mouth 2 times a day as needed.      cyclobenzaprine (Flexeril) 10 mg tablet Take 1 tablet (10 mg) by mouth in the morning and 1 tablet (10 mg) in the evening and 1 tablet (10 mg) before bedtime.      gabapentin (Neurontin) 300 mg capsule Take 2 capsules (600 mg) by mouth in  the morning and 2 capsules (600 mg) in the evening and 2 capsules (600 mg) before bedtime.      Ventolin HFA 90 mcg/actuation inhaler INHALE 2 PUFFS BY MOUTH AS INSTRUCTED EVERY 4 HOURS AS NEEDED.       No current facility-administered medications on file prior to visit.        ASSESSMENT AND PLAN:  Assessment/Plan   Diagnoses and all orders for this visit:  Healthcare maintenance  Comments:  - will assess for dexa, mammogram, etc at next appt  Carotid stenosis, left  Comments:  - refer to vascular surgery  - c/w asa and statin  Orders:  -     Referral to Vascular Surgery; Future  Type 2 diabetes mellitus with other circulatory complication, with long-term current use of insulin (CMS/[facility])  Comments:  - HgbA1c 10.3 on 3/6/2023  - c/w trulicity qwk, metformin, lantus  Anxiety and depression  Comments:  - refer to psych for eval  - pt reports that she used to be on adderall  Orders:  -     Referral to Psychiatry; Future      --------------------  Written by Nellie Graham LPN, acting as a scribe for Dr. Harris. This note accurately reflects the work and decisions made by Dr. Harris.     I, Dr. Harris, attest all medical record entries made by the scribe were under my direction and were personally dictated by me. I have reviewed the chart and agree that the record accurately reflects my performance of the history, physical exam, and assessment and plan.

## 2023-04-17 NOTE — PROGRESS NOTES
Chief Complaint:   SNF follow-up  Generalized weakness/impaired ambulation 2/2 dizziness    HPI:   67-year-old female presenting to [facility] ER on 3/5 with c/o dizziness x 4 days. Patient reported generalized weakness and inability to walk 2/2 dizziness. She has a hx of vertigo and has been taking her meclizine without significant relief. Work-up in ER: /80, , RR 18, T 36.4, SpO2 94% on RA, WBC 14.9, Hgb 16.7, Hematocrit 49.3, Na+ 131, K+ 3.6, BUN 28, Cr 0.79, UA negative for UTI, troponin 14 >>12, COVID/Flu negative, BNP 27, CXR negative, CTH showing infarct in the L frontal and L occipital lobes (new from 4/7/21), but otherwise chronic appearing. Patient was given Meclizine, Zofran, and IV NS bolus. She was admitted to the hospital for further evaluation and treatment. Hospital course:       Dizziness with orthostatic hypotension-IVF, orthostatic BP BID, MRI/MRA negative for acute stroke, ECHO w/ normal LVSF with an EF of 60-65% w/ impaired relaxation pattern for LVDF, c/w aspirin and atorvastatin, zofran prn, meclizine scheduled, c/w midodrine, ENT consult, cardio consult  Physical deconditioning 2/2 #1-PT/OT eval, recommending SNF  Hypokalemia-monitored  DM2 w/ hyperglycemia-accuchecks, mild ISS, lantus, HgbA1C 10.3  Hyponatremia 2/2 hyperglycemia-IVF, monitored  Depression/anxiety/mood disorder-c/w depakote, duloxetine, and lexapro  GERD-c/w PPI and carafate    Patient was HDS and discharged to [facility] on 3/9/23. Patient was transferred to the ER on 3/11 after she fell onto her R arm and had c/o right arm pain. Work-up in ER was negative for fracture and she was discharged back to [place]. Today, patient is c/o decreased appetite, nausea, diarrhea, and fatigue x 2-3 days. She has had + sick contacts. She denies any fevers, chills, SOB, cough, or chest pain. Staff report no clinical concerns.     ROS:    As above in HPI. Otherwise, all other systems have been reviewed and are negative for  complaint.    Medications reviewed and verified in NH chart.     Patient Active Problem List   Diagnosis    Bronchial asthma    Cervical dysphagia    Anxiety and depression    Diabetes mellitus (CMS/HCC)    Diabetic neuropathy (CMS/HCC)    Hypercholesteremia    Mild obesity    Rotator cuff syndrome of right shoulder    Other constipation    Osteoarthritis of multiple joints    Chronic neck pain    Dizziness    History of CVA (cerebrovascular accident)    Orthostatic hypotension    Diastolic dysfunction    Hypokalemia    Hyponatremia    Anxiety    Gastroesophageal reflux disease without esophagitis    Carotid stenosis, left    Healthcare maintenance        Past Medical History:   Diagnosis Date    Esophagitis     Gastroenteritis     Other conditions influencing health status     Disc herniation    Pneumonia 03/25/2023    Proteinuria, unspecified     Proteinuria    Yeast infection 03/25/2023       Past Surgical History:   Procedure Laterality Date    MR HEAD ANGIO WO IV CONTRAST  03/11/2014    MR HEAD ANGIO WO IV CONTRAST 3/11/2014 [facility]    MR HEAD ANGIO WO IV CONTRAST  04/19/2021    MR HEAD ANGIO WO IV CONTRAST 4/19/2021 [facility]    MR HEAD ANGIO WO IV CONTRAST  03/06/2023    MR HEAD ANGIO WO IV CONTRAST GEN MRI    MR NECK ANGIO WO IV CONTRAST  03/11/2014    MR NECK ANGIO WO IV CONTRAST 3/11/2014 [facility]    MR NECK ANGIO WO IV CONTRAST  04/19/2021    MR NECK ANGIO WO IV CONTRAST 4/19/2021 [facility]    MR NECK ANGIO WO IV CONTRAST  03/06/2023    MR NECK ANGIO WO IV CONTRAST GEN MRI    OTHER SURGICAL HISTORY      excision of multiple cysts       Family History   Problem Relation Name Age of Onset    Aneurysm Mother      Lung cancer Father      Ovarian cancer Other         Social History     Tobacco Use   Smoking Status Every Day    Packs/day: 0.50    Types: Cigarettes   Smokeless Tobacco Never   Vaping Use   Vaping Status Not on file       Social History     Substance and Sexual  Activity   Alcohol Use Yes    Comment: occasional       Social History     Substance and Sexual Activity   Drug Use Yes    Types: Marijuana    Comment: daily use       Allergies   Allergen Reactions    Rofecoxib Unknown        Vital Signs:   94/80-80-19-98.2-96% on RA     Physical Exam:  General: Lying in bed in NAD, fatigued but arouses with verbal stimulation   Head/Face: NCAT, symmetrical  Eyes: PERRLA, no injection, no discharge  ENT: Hearing not impaired, ears without scars or lesions, nasal mucosa and turbinates pink, septum midline, lips pink and moist  Neck: Supple, symmetrical  Respiratory: CTA without adventitious sounds, even and nonlabored without use of accessory muscles, good air exchange  Cardio: RRR without murmur or gallops, normal S1S2, no edema, pedal pulses 3+/4 bilaterally  Chest/Breast: Symmetrical  GI: BS x 4, normoactive, non-distended, abd round and soft, no masses or tenderness  : No suprapubic tenderness or distention  MSK: Gait not assessed, joints with full ROM without pain or contractures  Skin: Skin warm and dry, no induration  Neurologic: Cranial nerves II through XII intact, superficial touch and pain sensation intact  Psychiatric: Alert to person, place, and time, calm and cooperative, can be impulsive at times    Results/Data:   Lab Results   Component Value Date    WBC 8.1 03/09/2023    HGB 12.5 03/09/2023    HCT 37.8 03/09/2023     03/09/2023    CHOL 118 03/06/2023    TRIG 301 (H) 03/06/2023    HDL 34.0 (A) 03/06/2023    ALT 12 03/05/2023    AST 32 03/05/2023     03/09/2023    K 4.2 03/09/2023     03/09/2023    CREATININE 0.66 03/09/2023    BUN 8 03/09/2023    CO2 29 03/09/2023    INR 1.0 03/05/2023    HGBA1C 10.3 (A) 03/06/2023     Assessment/Plan:    1.  Dizziness/history of vertigo/orthostatic hypotension-encourage p.o. intake, continue with midodrine, hold for SBP greater than 120, monitor BP and HR closely, meclizine prn, patient will need to have a  hearing test scheduled as well as a VNG and ECOG, patient to follow-up with ENT (Dr. Jefferson) after testing is completed  2.  Physical deconditioning-continue with PT/OT, safety and fall precautions, ambulate with assistance only  3. Hypokalemia-monitor K+ level, replete as needed  4. GERD-c/w protonix and carafate  5. Hyponatremia-monitor BMP  6. Depression/anxiety/mood disorder/insomnia-c/w depakote, duloxetine, lexapro, and melatonin, monitor behaviors, supportive care, Psych to follow  7. DM2-LCS diet, accuchecks, c/w humalog s/s with meals, glargine, and trulicty, monitor HgbA1C, diabetic foot care, yearly eye exam, BP and lipid control  8. Diabetic neuropathy-c/w gabapentin  9. HLD/Hx CVA-c/w aspirin and atorvastatin, monitor lipid panel and LFTs  10. RUE pain/OA-Tylenol arthritis 650 mg PO BID and flexeril, encourage ROM  11. Hx asthma-albuterol prn  12. Gastroenteritis-zofran prn for N/V, imodium prn for diarrhea, encourage fluids, check CBC w/ diff and CMP in AM, monitor closely     Orders:  CBC w/ diff, CMP, and depakote level on 4/4  Imodium 1 capsule Q 6 hours prn for loose stools/diarrhea  Zofran 4 mg PO Q 6 hours prn for N/V    Code Status:   Full code

## 2023-04-18 PROCEDURE — G0179 MD RECERTIFICATION HHA PT: HCPCS | Performed by: INTERNAL MEDICINE

## 2023-04-26 ENCOUNTER — TELEPHONE (OUTPATIENT)
Dept: PRIMARY CARE | Facility: CLINIC | Age: 68
End: 2023-04-26
Payer: MEDICARE

## 2023-04-26 NOTE — TELEPHONE ENCOUNTER
[place] (OT)  Called to inform Dr. Harris pt had a fall in the bathroom, the toilet seat was lose, no head injury  Bruise on low back right side, no other injuries   FYI

## 2023-04-26 NOTE — PROGRESS NOTES
Chief Complaint:   SNF follow-up  Generalized weakness/impaired ambulation 2/2 dizziness    HPI:   67-year-old female presenting to [facility] ER on 3/5 with c/o dizziness x 4 days. Patient reported generalized weakness and inability to walk 2/2 dizziness. She has a hx of vertigo and has been taking her meclizine without significant relief. Work-up in ER: /80, , RR 18, T 36.4, SpO2 94% on RA, WBC 14.9, Hgb 16.7, Hematocrit 49.3, Na+ 131, K+ 3.6, BUN 28, Cr 0.79, UA negative for UTI, troponin 14 >>12, COVID/Flu negative, BNP 27, CXR negative, CTH showing infarct in the L frontal and L occipital lobes (new from 4/7/21), but otherwise chronic appearing. Patient was given Meclizine, Zofran, and IV NS bolus. She was admitted to the hospital for further evaluation and treatment. Hospital course:       Dizziness with orthostatic hypotension-IVF, orthostatic BP BID, MRI/MRA negative for acute stroke, ECHO w/ normal LVSF with an EF of 60-65% w/ impaired relaxation pattern for LVDF, c/w aspirin and atorvastatin, zofran prn, meclizine scheduled, c/w midodrine, ENT consult, cardio consult  Physical deconditioning 2/2 #1-PT/OT eval, recommending SNF  Hypokalemia-monitored  DM2 w/ hyperglycemia-accuchecks, mild ISS, lantus, HgbA1C 10.3  Hyponatremia 2/2 hyperglycemia-IVF, monitored  Depression/anxiety/mood disorder-c/w depakote, duloxetine, and lexapro  GERD-c/w PPI and carafate    Patient was HDS and discharged to [facility] on 3/9/23. Patient was transferred to the ER on 3/11 after she fell onto her R arm and had c/o right arm pain. Work-up in ER was negative for fracture and she was discharged back to [place]. Today, patient reports that she is feeling well. She continues to report dizziness when lays down, which lasts for about 1 minute. She is ambulating well without dizziness. She denies any SOB, cough, or chest pain. Staff report no clinical concerns.     ROS:    As above in HPI. Otherwise, all other  systems have been reviewed and are negative for complaint.    Medications reviewed and verified in NH chart.     Patient Active Problem List   Diagnosis    Bronchial asthma    Cervical dysphagia    Anxiety and depression    Diabetes mellitus (CMS/HCC)    Diabetic neuropathy (CMS/HCC)    Hypercholesteremia    Mild obesity    Rotator cuff syndrome of right shoulder    Other constipation    Osteoarthritis of multiple joints    Chronic neck pain    Dizziness    History of CVA (cerebrovascular accident)    Orthostatic hypotension    Diastolic dysfunction    Hypokalemia    Hyponatremia    Anxiety    Gastroesophageal reflux disease without esophagitis    Carotid stenosis, left    Healthcare maintenance        Past Medical History:   Diagnosis Date    Esophagitis     Gastroenteritis     Other conditions influencing health status     Disc herniation    Pneumonia 03/25/2023    Proteinuria, unspecified     Proteinuria    Yeast infection 03/25/2023       Past Surgical History:   Procedure Laterality Date    MR HEAD ANGIO WO IV CONTRAST  03/11/2014    MR HEAD ANGIO WO IV CONTRAST 3/11/2014 [facility]    MR HEAD ANGIO WO IV CONTRAST  04/19/2021    MR HEAD ANGIO WO IV CONTRAST 4/19/2021 [facility]    MR HEAD ANGIO WO IV CONTRAST  03/06/2023    MR HEAD ANGIO WO IV CONTRAST GEN MRI    MR NECK ANGIO WO IV CONTRAST  03/11/2014    MR NECK ANGIO WO IV CONTRAST 3/11/2014 [facility]    MR NECK ANGIO WO IV CONTRAST  04/19/2021    MR NECK ANGIO WO IV CONTRAST 4/19/2021 [facility]    MR NECK ANGIO WO IV CONTRAST  03/06/2023    MR NECK ANGIO WO IV CONTRAST GEN MRI    OTHER SURGICAL HISTORY      excision of multiple cysts       Family History   Problem Relation Name Age of Onset    Aneurysm Mother      Lung cancer Father      Ovarian cancer Other         Social History     Tobacco Use   Smoking Status Every Day    Packs/day: 0.50    Types: Cigarettes   Smokeless Tobacco Never   Vaping Use   Vaping Status Not on file        Social History     Substance and Sexual Activity   Alcohol Use Yes    Comment: occasional       Social History     Substance and Sexual Activity   Drug Use Yes    Types: Marijuana    Comment: daily use       Allergies   Allergen Reactions    Rofecoxib Unknown        Vital Signs:   97/67-82-18-98.0-96% on RA     Physical Exam:  General: Sitting up in bed in NAD, alert  Head/Face: NCAT, symmetrical  Eyes: PERRLA, no injection, no discharge  ENT: Hearing not impaired, ears without scars or lesions, nasal mucosa and turbinates pink, septum midline, lips pink and moist  Neck: Supple, symmetrical  Respiratory: CTA without adventitious sounds, even and nonlabored without use of accessory muscles, good air exchange  Cardio: RRR without murmur or gallops, normal S1S2, no edema, pedal pulses 3+/4 bilaterally  Chest/Breast: Symmetrical  GI: BS x 4, normoactive, non-distended, abd round and soft, no masses or tenderness  : No suprapubic tenderness or distention  MSK: Gait not assessed, joints with full ROM without pain or contractures  Skin: Skin warm and dry, no induration  Neurologic: Cranial nerves II through XII intact, superficial touch and pain sensation intact  Psychiatric: Alert to person, place, and time, calm and cooperative, can be impulsive at times    Results/Data:   4/4/23: Glucose 160, VPA 14, CBC WNL  Lab Results   Component Value Date    WBC 8.1 03/09/2023    HGB 12.5 03/09/2023    HCT 37.8 03/09/2023     03/09/2023    CHOL 118 03/06/2023    TRIG 301 (H) 03/06/2023    HDL 34.0 (A) 03/06/2023    ALT 12 03/05/2023    AST 32 03/05/2023     03/09/2023    K 4.2 03/09/2023     03/09/2023    CREATININE 0.66 03/09/2023    BUN 8 03/09/2023    CO2 29 03/09/2023    INR 1.0 03/05/2023    HGBA1C 10.3 (A) 03/06/2023     Assessment/Plan:    1.  Dizziness/history of vertigo/orthostatic hypotension-encourage p.o. intake, continue with midodrine, hold for SBP greater than 120, monitor BP and HR  closely, meclizine prn, patient scheduled to have a hearing test and a VNG and ECOG, patient to follow-up with ENT (Dr. Jefferson) after testing is completed  2.  Physical deconditioning-continue with PT/OT, safety and fall precautions, ambulate with assistance only  3. Hypokalemia-monitor K+ level, replete as needed  4. GERD-c/w protonix and carafate  5. Hyponatremia-monitor BMP  6. Depression/anxiety/mood disorder/insomnia-c/w depakote, duloxetine, lexapro, and melatonin, monitor behaviors, supportive care, Psych to follow  7. DM2-LCS diet, accuchecks, c/w humalog s/s with meals, glargine, and trulicty, monitor HgbA1C, diabetic foot care, yearly eye exam, BP and lipid control  8. Diabetic neuropathy-c/w gabapentin  9. HLD/Hx CVA-c/w aspirin and atorvastatin, monitor lipid panel and LFTs  10. RUE pain/OA-Tylenol arthritis 650 mg PO BID and flexeril, encourage ROM  11. Hx asthma-albuterol prn  12. Gastroenteritis-RESOLVED    Orders:  NNO    Code Status:   Full code    Total cumulative time spent in preparation of this discharge, including documentation review, coordination of care with the medical team including PT/OT/SW/treating consultants, discussion with patient and/or pertinent family members, finalization of prescriptions, F/U appointments, and this discharge summary was approximately 40 minutes.

## 2023-05-05 ENCOUNTER — TELEPHONE (OUTPATIENT)
Dept: PRIMARY CARE | Facility: CLINIC | Age: 68
End: 2023-05-05
Payer: MEDICAID

## 2023-05-05 NOTE — TELEPHONE ENCOUNTER
Nellie OT [phone]  Called to notify Dr. Harris of pt fall, she was going to the restroom and fell off the toilet   Bumped her head, no pain  No injuries   Vitals are normal

## 2023-05-10 ENCOUNTER — TELEPHONE (OUTPATIENT)
Dept: PRIMARY CARE | Facility: CLINIC | Age: 68
End: 2023-05-10
Payer: MEDICAID

## 2023-05-31 ENCOUNTER — PATIENT OUTREACH (OUTPATIENT)
Dept: PRIMARY CARE | Facility: CLINIC | Age: 68
End: 2023-05-31
Payer: MEDICAID

## 2023-05-31 RX ORDER — AMOXICILLIN AND CLAVULANATE POTASSIUM 875; 125 MG/1; MG/1
1 TABLET, FILM COATED ORAL 2 TIMES DAILY
COMMUNITY
Start: 2023-05-30 | End: 2023-06-01

## 2023-05-31 NOTE — PROGRESS NOTES
Discharge Facility: [facility]  Discharge Diagnosis: UTI  Admission Date: 5-25-23  Discharge Date: 5-30-23    PCP Appointment Date: 6-6-23  Specialist Appointment Date: Unknown   Hospital Encounter and Summary: Linked   See discharge assessment below for further details  Engagement  Call Start Time: 0841 (5/31/2023  8:43 AM)    Medications  Medications reviewed with patient/caregiver?: Yes (5/31/2023  8:43 AM)  Is the patient having any side effects they believe may be caused by any medication additions or changes?: No (5/31/2023  8:43 AM)  Does the patient have all medications ordered at discharge?: Yes (5/31/2023  8:43 AM)  Care Management Interventions: No intervention needed (5/31/2023  8:43 AM)  Is the patient taking all medications as directed (includes completed medication regime)?: Yes (5/31/2023  8:43 AM)  Care Management Interventions: Provided patient education (5/31/2023  8:43 AM)    Appointments  Does the patient have a primary care provider?: Yes (5/31/2023  8:43 AM)  Care Management Interventions: Verified appointment date/time/provider (5/31/2023  8:43 AM)  Has the patient kept scheduled appointments due by today?: Yes (5/31/2023  8:43 AM)  Care Management Interventions: Advised patient to keep appointment (5/31/2023  8:43 AM)    Self Management  What is the home health agency?: [facility] (5/31/2023  8:43 AM)  Has home health visited the patient within 72 hours of discharge?: Call prior to 72 hours (5/31/2023  8:43 AM)    Patient Teaching  Does the patient have access to their discharge instructions?: Yes (5/31/2023  8:43 AM)  Care Management Interventions: Reviewed instructions with patient; Educated on MyChart (5/31/2023  8:43 AM)  What is the patient's perception of their health status since discharge?: Improving (5/31/2023  8:43 AM)  Is the patient/caregiver able to teach back the hierarchy of who to call/visit for symptoms/problems? PCP, Specialist, Home Health nurse, Urgent Care, ED, 911: Yes  (5/31/2023  8:43 AM)      Allie Worthington LPN

## 2023-06-06 ENCOUNTER — OFFICE VISIT (OUTPATIENT)
Dept: PRIMARY CARE | Facility: CLINIC | Age: 68
End: 2023-06-06
Payer: MEDICAID

## 2023-06-06 VITALS
WEIGHT: 161.2 LBS | HEART RATE: 106 BPM | HEIGHT: 64 IN | OXYGEN SATURATION: 93 % | BODY MASS INDEX: 27.52 KG/M2 | SYSTOLIC BLOOD PRESSURE: 101 MMHG | DIASTOLIC BLOOD PRESSURE: 65 MMHG

## 2023-06-06 DIAGNOSIS — Z79.4 TYPE 2 DIABETES MELLITUS WITH OTHER CIRCULATORY COMPLICATION, WITH LONG-TERM CURRENT USE OF INSULIN (MULTI): ICD-10-CM

## 2023-06-06 DIAGNOSIS — E11.59 TYPE 2 DIABETES MELLITUS WITH OTHER CIRCULATORY COMPLICATION, WITH LONG-TERM CURRENT USE OF INSULIN (MULTI): ICD-10-CM

## 2023-06-06 DIAGNOSIS — J18.9 PNEUMONIA OF BOTH LUNGS DUE TO INFECTIOUS ORGANISM, UNSPECIFIED PART OF LUNG: ICD-10-CM

## 2023-06-06 DIAGNOSIS — K20.90 ESOPHAGITIS: ICD-10-CM

## 2023-06-06 DIAGNOSIS — Z09 HOSPITAL DISCHARGE FOLLOW-UP: ICD-10-CM

## 2023-06-06 DIAGNOSIS — I65.22 CAROTID STENOSIS, LEFT: ICD-10-CM

## 2023-06-06 DIAGNOSIS — I95.9 HYPOTENSION, UNSPECIFIED HYPOTENSION TYPE: ICD-10-CM

## 2023-06-06 PROCEDURE — 3074F SYST BP LT 130 MM HG: CPT | Performed by: INTERNAL MEDICINE

## 2023-06-06 PROCEDURE — 3046F HEMOGLOBIN A1C LEVEL >9.0%: CPT | Performed by: INTERNAL MEDICINE

## 2023-06-06 PROCEDURE — 99496 TRANSJ CARE MGMT HIGH F2F 7D: CPT | Performed by: INTERNAL MEDICINE

## 2023-06-06 PROCEDURE — 1159F MED LIST DOCD IN RCRD: CPT | Performed by: INTERNAL MEDICINE

## 2023-06-06 PROCEDURE — 3078F DIAST BP <80 MM HG: CPT | Performed by: INTERNAL MEDICINE

## 2023-06-06 RX ORDER — ROSUVASTATIN CALCIUM 20 MG/1
20 TABLET, COATED ORAL DAILY
COMMUNITY
End: 2023-08-15 | Stop reason: ALTCHOICE

## 2023-06-06 RX ORDER — DULAGLUTIDE 1.5 MG/.5ML
1.5 INJECTION, SOLUTION SUBCUTANEOUS
Qty: 2 ML | Refills: 2 | Status: SHIPPED | OUTPATIENT
Start: 2023-06-06 | End: 2023-08-15 | Stop reason: SINTOL

## 2023-06-06 RX ORDER — ASPIRIN/CAFFEINE 500-32.5MG
TABLET ORAL
COMMUNITY
End: 2023-06-06 | Stop reason: SINTOL

## 2023-06-06 RX ORDER — INSULIN LISPRO 100 [IU]/ML
INJECTION, SOLUTION INTRAVENOUS; SUBCUTANEOUS
Qty: 10 ML | Refills: 12 | Status: SHIPPED | OUTPATIENT
Start: 2023-06-06 | End: 2023-08-15 | Stop reason: ALTCHOICE

## 2023-06-06 RX ORDER — ACETAMINOPHEN 500 MG
5 TABLET ORAL DAILY PRN
COMMUNITY

## 2023-06-06 RX ORDER — INSULIN GLARGINE 100 [IU]/ML
INJECTION, SOLUTION SUBCUTANEOUS
Qty: 3 ML | Refills: 2 | Status: SHIPPED | OUTPATIENT
Start: 2023-06-06 | End: 2023-08-15 | Stop reason: ALTCHOICE

## 2023-06-06 ASSESSMENT — ENCOUNTER SYMPTOMS: WEAKNESS: 1

## 2023-06-06 ASSESSMENT — PATIENT HEALTH QUESTIONNAIRE - PHQ9
SUM OF ALL RESPONSES TO PHQ9 QUESTIONS 1 AND 2: 0
1. LITTLE INTEREST OR PLEASURE IN DOING THINGS: NOT AT ALL
2. FEELING DOWN, DEPRESSED OR HOPELESS: NOT AT ALL

## 2023-06-06 NOTE — PATIENT INSTRUCTIONS
Nice to see you today.    Regarding the midodrine - ok to give if TOP number of blood pressure is LESS than 100    Referral placed for General Surgery for esophagitis, patulous esophagus.   Referral placed for Vascular Surgery for severe left carotid disease.    Refill lantus

## 2023-06-06 NOTE — PROGRESS NOTES
Patient ID:   Lenora Aems is a 67 y.o. female with PMH remarkable for asthma, DM2 with neuropathy, vertigo, CVA, diastolic dysfunction, anxiety, GERD who presents to the office today for Hospital followup.    Hospital Discharge Follow Up:  Date(s): 5/25 to 5/30/2023  Location: [facility]  Reason Presented to ER: Vertigo  Final Dx: UTI, PNA  Imaging:  CT chest Mild bronchial wall thickening, mucous plugging and bibasilar interstitial opacities which may be infectious or inflammatory. Patulous esophagus with wall thickening and fluid contents. Correlation for esophagitis suggested. Remote appearing anterior left rib fractures.   CT C spine negative & CT head negative   Patient was stable to be discharge to home with home health. She was dicharged on Augmentin, midodrine, and sucralfate.  -- taking midodrine around the clock or PRN?  -- reports that her arm is still hurting. Has been home since Sunday.   -- still having vertigo, especially when she looks up.   -- discussed with her about carotid US showing SEVERE carotid stenosis.   -- regarding HgbA1C 10.3 on 3/6/2023 - would like to stop metformin XR 1g BID  - ok to take baby ASA  - stop blanca aspirin    REVIEW OF SYSTEMS:  Review of Systems   Neurological:  Positive for weakness.   All other systems reviewed and are negative.    12 point review of systems negative unless stated above in HPI    VITAL SIGNS:  Vitals:    06/06/23 1635   BP: 101/65   Pulse: 106   SpO2: 93%       ALLERGIES:  Allergies   Allergen Reactions    Rofecoxib Unknown        PHYSICAL EXAM:  Physical Exam  Vitals reviewed.   Constitutional:       General: She is not in acute distress.     Appearance: Normal appearance. She is not ill-appearing.   HENT:      Head: Normocephalic and atraumatic.      Right Ear: Tympanic membrane and external ear normal.      Left Ear: Tympanic membrane and external ear normal.      Nose: Nose normal.      Mouth/Throat:      Mouth: Mucous membranes are moist.       Pharynx: Oropharynx is clear.   Eyes:      Conjunctiva/sclera: Conjunctivae normal.      Pupils: Pupils are equal, round, and reactive to light.   Cardiovascular:      Rate and Rhythm: Normal rate and regular rhythm.      Heart sounds: Normal heart sounds. No murmur heard.  Pulmonary:      Effort: Pulmonary effort is normal. No respiratory distress.      Breath sounds: Normal breath sounds. No wheezing.   Abdominal:      General: There is no distension.      Palpations: Abdomen is soft. There is no mass.      Tenderness: There is no abdominal tenderness.   Musculoskeletal:         General: Normal range of motion.      Cervical back: Normal range of motion and neck supple.   Skin:     General: Skin is warm and dry.   Neurological:      General: No focal deficit present.      Mental Status: She is alert and oriented to person, place, and time.      Sensory: No sensory deficit.      Motor: Weakness present.      Coordination: Coordination abnormal.      Gait: Gait abnormal.   Psychiatric:         Mood and Affect: Mood normal.         Behavior: Behavior normal.         MEDICATIONS:  Current Outpatient Medications on File Prior to Visit   Medication Sig Dispense Refill    aspirin 81 mg chewable tablet Chew 1 tablet (81 mg) once daily.      cyclobenzaprine (Flexeril) 10 mg tablet Take 1 tablet (10 mg) by mouth 3 times a day as needed for muscle spasms.      escitalopram (Lexapro) 10 mg tablet Take 1 tablet (10 mg) by mouth once daily.      ipratropium-albuteroL (Duo-Neb) 0.5-2.5 mg/3 mL nebulizer solution INHALE THE CONTENTS OF 1 VIAL (3 ML) VIA NEBULIZER AS INSTRUCTED EVERY 4 HOURS AS NEEDED   INHALE OVER 5 TO 15 MINUTES      meclizine (Antivert) 25 mg tablet Take 1 tablet (25 mg) by mouth 3 times a day as needed for dizziness.      midodrine (Proamatine) 5 mg tablet Take 1 tablet (5 mg) by mouth 3 times a day. PRN FOR SBP LESS THAN OR EQUAL       rosuvastatin (Crestor) 20 mg tablet Take 1 tablet (20 mg) by mouth  once daily.      sucralfate (Carafate) 100 mg/mL suspension Take 10 mL (1 g) by mouth 4 times a day.      Ventolin HFA 90 mcg/actuation inhaler INHALE 2 PUFFS BY MOUTH AS INSTRUCTED EVERY 4 HOURS AS NEEDED.      [DISCONTINUED] aspirin-caffeine (Radha Back and Body) 500-32.5 mg tablet Take by mouth.      [DISCONTINUED] metFORMIN XR (Glucophage-XR) 500 mg 24 hr tablet Take 2 tablets (1,000 mg) by mouth 2 times a day with meals.      [] amoxicillin-pot clavulanate (Augmentin) 875-125 mg tablet Take 1 tablet (875 mg) by mouth 2 times a day.      divalproex (Depakote) 125 mg EC tablet Take 1 tablet (125 mg) by mouth 2 times a day. Do not crush, chew, or split.      DULoxetine (Cymbalta) 60 mg DR capsule Take 1 capsule (60 mg) by mouth once daily. Do not crush or chew.      melatonin 5 mg tablet Take 1 tablet (5 mg) by mouth once daily as needed for sleep.      pantoprazole (ProtoNix) 40 mg EC tablet Take 1 tablet (40 mg) by mouth once daily in the morning. Take before meals. Do not crush, chew, or split.      [DISCONTINUED] aspirin-caffeine 500-32.5 mg tablet Take by mouth 2 times a day as needed.      [DISCONTINUED] atorvastatin (Lipitor) 20 mg tablet Take 1 tablet (20 mg) by mouth once daily.      [DISCONTINUED] dulaglutide (Trulicity) 1.5 mg/0.5 mL pen injector Inject 1.5 mg under the skin 1 (one) time per week.      [DISCONTINUED] gabapentin (Neurontin) 300 mg capsule Take 2 capsules (600 mg) by mouth 3 times a day.      [DISCONTINUED] Lantus Solostar U-100 Insulin 100 unit/mL (3 mL) pen INJECT 30 UNITS SUBCUTANEOUSLY ONCE DAILY       No current facility-administered medications on file prior to visit.       LABORATORY DATA:  Lab Results   Component Value Date    WBC 9.9 2023    HGB 11.8 (L) 2023    HCT 36.5 2023     2023    CHOL 118 2023    TRIG 301 (H) 2023    HDL 34.0 (A) 2023    ALT 15 2023    AST 9 2023     2023    K 3.6 2023      05/30/2023    CREATININE 0.65 05/30/2023    BUN 6 05/30/2023    CO2 26 05/30/2023    INR 1.0 03/05/2023    HGBA1C 10.3 (A) 03/06/2023     par    ASSESSMENT AND PLAN:  Assessment/Plan   Diagnoses and all orders for this visit:  Hospital discharge follow-up  Pneumonia of both lungs due to infectious organism, unspecified part of lung  Esophagitis  -     Referral to General Surgery; Future  Hypotension, unspecified hypotension type  Type 2 diabetes mellitus with other circulatory complication, with long-term current use of insulin (CMS/[facility])  -     Lantus Solostar U-100 Insulin 100 unit/mL (3 mL) pen; INJECT 30 UNITS SUBCUTANEOUSLY ONCE DAILY  -     insulin lispro (HumaLOG U-100 Insulin) 100 unit/mL injection; Take as directed per insulin instructions.  -     dulaglutide (Trulicity) 1.5 mg/0.5 mL pen injector; Inject 1.5 mg under the skin 1 (one) time per week.  Carotid stenosis, left  -     Referral to Vascular Surgery; Future      ------  Written by Nellie Graham RN, acting as a scribe for Dr. Harris. This note accurately reflects the work and decisions made by Dr. Harris.     I, Dr. Harris, attest all medical record entries made by the scribe were under my direction and were personally dictated by me. I have reviewed the chart and agree that the record accurately reflects my performance of the history, physical exam, and assessment and plan.

## 2023-06-07 ENCOUNTER — PATIENT OUTREACH (OUTPATIENT)
Dept: PRIMARY CARE | Facility: CLINIC | Age: 68
End: 2023-06-07
Payer: MEDICAID

## 2023-06-07 NOTE — PROGRESS NOTES
Unable to reach patient for call back after patient's follow up appointment with PCP.   LVM with call back number for patient to call if needed   If no voicemail available call attempts x 2 were made to contact the patient to assist with any questions or concerns patient may have.   Allie Worthington LPN

## 2023-06-19 DIAGNOSIS — F41.9 ANXIETY AND DEPRESSION: ICD-10-CM

## 2023-06-19 DIAGNOSIS — K21.9 GASTROESOPHAGEAL REFLUX DISEASE WITHOUT ESOPHAGITIS: ICD-10-CM

## 2023-06-19 DIAGNOSIS — R42 DIZZINESS: ICD-10-CM

## 2023-06-19 DIAGNOSIS — F32.A ANXIETY AND DEPRESSION: ICD-10-CM

## 2023-06-19 DIAGNOSIS — Z86.73 HISTORY OF CVA (CEREBROVASCULAR ACCIDENT): ICD-10-CM

## 2023-06-20 RX ORDER — MECLIZINE HYDROCHLORIDE 25 MG/1
TABLET ORAL
Qty: 90 TABLET | Refills: 0 | Status: SHIPPED | OUTPATIENT
Start: 2023-06-20 | End: 2023-09-25

## 2023-06-20 RX ORDER — PANTOPRAZOLE SODIUM 40 MG/1
TABLET, DELAYED RELEASE ORAL
Qty: 90 TABLET | Refills: 0 | Status: SHIPPED | OUTPATIENT
Start: 2023-06-20 | End: 2023-08-15 | Stop reason: SDUPTHER

## 2023-06-20 RX ORDER — DULOXETIN HYDROCHLORIDE 60 MG/1
CAPSULE, DELAYED RELEASE ORAL
Qty: 30 CAPSULE | Refills: 0 | Status: SHIPPED | OUTPATIENT
Start: 2023-06-20 | End: 2023-08-15 | Stop reason: SDUPTHER

## 2023-06-20 RX ORDER — ESCITALOPRAM OXALATE 20 MG/1
TABLET ORAL
Qty: 30 TABLET | Refills: 0 | Status: SHIPPED | OUTPATIENT
Start: 2023-06-20 | End: 2023-08-15 | Stop reason: ALTCHOICE

## 2023-06-20 RX ORDER — DIVALPROEX SODIUM 125 MG/1
TABLET, DELAYED RELEASE ORAL
Qty: 60 TABLET | Refills: 0 | Status: SHIPPED | OUTPATIENT
Start: 2023-06-20 | End: 2023-08-15 | Stop reason: ALTCHOICE

## 2023-06-29 ENCOUNTER — PATIENT OUTREACH (OUTPATIENT)
Dept: PRIMARY CARE | Facility: CLINIC | Age: 68
End: 2023-06-29
Payer: MEDICAID

## 2023-06-29 NOTE — PROGRESS NOTES
Unable to reach patient for one month post discharge follow up call.   LVM with call back number for patient to call if needed   If no voicemail available call attempts x 2 were made to contact the patient to assist with any questions or concerns patient may have.  Allie Worthington LPN

## 2023-07-17 ENCOUNTER — APPOINTMENT (OUTPATIENT)
Dept: PRIMARY CARE | Facility: CLINIC | Age: 68
End: 2023-07-17
Payer: MEDICAID

## 2023-07-25 ENCOUNTER — NURSING HOME VISIT (OUTPATIENT)
Dept: POST ACUTE CARE | Facility: EXTERNAL LOCATION | Age: 68
End: 2023-07-25
Payer: MEDICAID

## 2023-07-25 DIAGNOSIS — E78.00 HYPERCHOLESTEREMIA: Primary | ICD-10-CM

## 2023-07-25 DIAGNOSIS — G47.00 INSOMNIA, UNSPECIFIED TYPE: ICD-10-CM

## 2023-07-25 DIAGNOSIS — R29.6 RECURRENT FALLS: ICD-10-CM

## 2023-07-25 DIAGNOSIS — Z79.4 TYPE 2 DIABETES MELLITUS WITH OTHER CIRCULATORY COMPLICATION, WITH LONG-TERM CURRENT USE OF INSULIN (MULTI): ICD-10-CM

## 2023-07-25 DIAGNOSIS — Z86.73 HISTORY OF CVA (CEREBROVASCULAR ACCIDENT): ICD-10-CM

## 2023-07-25 DIAGNOSIS — E11.59 TYPE 2 DIABETES MELLITUS WITH OTHER CIRCULATORY COMPLICATION, WITH LONG-TERM CURRENT USE OF INSULIN (MULTI): ICD-10-CM

## 2023-07-25 DIAGNOSIS — M19.90 OSTEOARTHRITIS, UNSPECIFIED OSTEOARTHRITIS TYPE, UNSPECIFIED SITE: ICD-10-CM

## 2023-07-25 DIAGNOSIS — K21.9 GASTROESOPHAGEAL REFLUX DISEASE WITHOUT ESOPHAGITIS: ICD-10-CM

## 2023-07-25 DIAGNOSIS — R42 VERTIGO: ICD-10-CM

## 2023-07-25 DIAGNOSIS — I95.9 HYPOTENSION, UNSPECIFIED HYPOTENSION TYPE: ICD-10-CM

## 2023-07-25 DIAGNOSIS — R53.81 PHYSICAL DECONDITIONING: ICD-10-CM

## 2023-07-25 DIAGNOSIS — F41.9 ANXIETY AND DEPRESSION: ICD-10-CM

## 2023-07-25 DIAGNOSIS — F32.A ANXIETY AND DEPRESSION: ICD-10-CM

## 2023-07-25 PROCEDURE — 99309 SBSQ NF CARE MODERATE MDM 30: CPT | Performed by: NURSE PRACTITIONER

## 2023-07-25 NOTE — LETTER
Patient: Lenora Ames  : 1955    Encounter Date: 2023    Patient ID: Lenora Ames is a 68 y.o. female who is acute skilled care being seen and evaluated for multiple medical problems.  [id number]   1955    /70   Pulse 76   Temp 36.8 °C (98.3 °F)   Resp 15   Wt 72.6 kg (160 lb)   SpO2 97%   BMI 27.46 kg/m²     Assessment/Plan  Problem List Items Addressed This Visit       Anxiety and depression     Lexapro 10 mg by mouth daily          Diabetes mellitus (CMS/HCC)     Metformin 1000 mg by mouth BID   Glargine 30 units subcutaneous daily          Hypercholesteremia - Primary     Atorvastatin 20 mg by mouth every HS         History of CVA (cerebrovascular accident)     RUE weakness   ASA 81 mg by mouth daily   PT/OT         Hypotension     Midodrine 5 mg by mouth every 8 hrs          Gastroesophageal reflux disease without esophagitis     Sucralfate 1 gram by mouth QID          Recurrent falls     PT/OT         Vertigo     Meclizine 25 mg by mouth every 8 hrs as needed  Vestibular therapy apt pending          Degenerative joint disease     Radha back and body 500/32,5 mg two tablets by mouth every 12 hrs as needed   Flexeril 10 mg by mouth every 8 hrs (Muscle spasms)          Insomnia     Melatonin 5 mg by mouth every HS          Physical deconditioning     PT/OT- falls              HPI: Client was admitted [facility] from 2023- 2023 with the final diagnosis of Dizziness, N/V, esophagitis, and elevated glucose level. PMH includes vertigo, HLD, HF (LVEF 60-65%), DM2, neuropathy, back pain, DDD,  depression, and CVA. WBC in ED 22. Head and Neck CT (-). CT PE (-). Client received IV Zosyn. Client to follow up at Vestibular clinic OP. Client denies HA. C/O vertigo. Denies CP, SOB, Cough, or increased edema. RA. Denies abdominal pain, N/V, diarrhea, or constipation. Denies dysuria. C/O urinary frequency.  Denies change in appetite. C/O chronic bilateral shoulder pain.     Review  of Systems ROS as described in in HPI, Dysphasia since CVA.      Physical Exam  Constitutional:       Comments: Sitting at bedside    HENT:      Mouth/Throat:      Mouth: Mucous membranes are moist.   Cardiovascular:      Rate and Rhythm: Normal rate.   Pulmonary:      Effort: Pulmonary effort is normal.      Breath sounds: Normal breath sounds.      Comments: RA  Abdominal:      General: Bowel sounds are normal.      Palpations: Abdomen is soft.   Genitourinary:     Comments: Urinary frequency   Musculoskeletal:      Cervical back: Neck supple.      Comments: H/O falls  Vertigo  RUE weakness since CVA  Walker    Skin:     General: Skin is warm and dry.   Neurological:      Mental Status: She is alert. Mental status is at baseline.      Comments: Dysphasia since CVA (Per client)    Psychiatric:         Mood and Affect: Mood normal.      Comments: Cooperative and follows direction                    Electronically Signed By: LINK Espinoza   7/26/23  8:09 PM

## 2023-07-26 VITALS
SYSTOLIC BLOOD PRESSURE: 110 MMHG | TEMPERATURE: 98.3 F | RESPIRATION RATE: 15 BRPM | HEART RATE: 76 BPM | WEIGHT: 160 LBS | OXYGEN SATURATION: 97 % | DIASTOLIC BLOOD PRESSURE: 70 MMHG | BODY MASS INDEX: 27.46 KG/M2

## 2023-07-26 PROBLEM — M54.12 RIGHT CERVICAL RADICULOPATHY: Status: ACTIVE | Noted: 2023-07-26

## 2023-07-26 PROBLEM — J18.9 CAP (COMMUNITY ACQUIRED PNEUMONIA): Status: ACTIVE | Noted: 2017-06-14

## 2023-07-26 PROBLEM — J44.9 CHRONIC OBSTRUCTIVE PULMONARY DISEASE, UNSPECIFIED (MULTI): Status: ACTIVE | Noted: 2021-04-20

## 2023-07-26 PROBLEM — F17.210 CIGARETTE SMOKER: Status: ACTIVE | Noted: 2023-07-26

## 2023-07-26 PROBLEM — M16.12 PRIMARY OSTEOARTHRITIS OF LEFT HIP: Status: ACTIVE | Noted: 2021-05-14

## 2023-07-26 PROBLEM — R53.81 PHYSICAL DECONDITIONING: Status: ACTIVE | Noted: 2023-07-26

## 2023-07-26 PROBLEM — E87.20 LACTIC ACIDOSIS: Status: ACTIVE | Noted: 2023-07-26

## 2023-07-26 PROBLEM — E78.5 HYPERLIPIDEMIA: Status: RESOLVED | Noted: 2023-07-26 | Resolved: 2023-07-26

## 2023-07-26 PROBLEM — G47.00 INSOMNIA: Status: ACTIVE | Noted: 2023-07-26

## 2023-07-26 PROBLEM — E78.5 HYPERLIPIDEMIA: Status: ACTIVE | Noted: 2023-07-26

## 2023-07-26 PROBLEM — M19.90 DEGENERATIVE JOINT DISEASE: Status: ACTIVE | Noted: 2023-07-26

## 2023-07-26 PROBLEM — G62.9 NEUROPATHY: Status: ACTIVE | Noted: 2023-07-26

## 2023-07-26 PROBLEM — M75.121 COMPLETE ROTATOR CUFF TEAR OR RUPTURE OF RIGHT SHOULDER, NOT SPECIFIED AS TRAUMATIC: Status: ACTIVE | Noted: 2021-04-20

## 2023-07-26 PROBLEM — F12.10 CANNABIS ABUSE, UNCOMPLICATED: Status: ACTIVE | Noted: 2021-04-20

## 2023-07-26 PROBLEM — E11.42 DIABETIC POLYNEUROPATHY ASSOCIATED WITH TYPE 2 DIABETES MELLITUS (MULTI): Status: ACTIVE | Noted: 2019-05-17

## 2023-07-26 PROBLEM — R29.6 RECURRENT FALLS: Status: ACTIVE | Noted: 2023-07-26

## 2023-07-26 PROBLEM — F90.2 ATTENTION DEFICIT HYPERACTIVITY DISORDER (ADHD), COMBINED TYPE: Status: ACTIVE | Noted: 2022-07-11

## 2023-07-26 PROBLEM — J18.9 PNEUMONIA: Status: ACTIVE | Noted: 2023-07-26

## 2023-07-26 PROBLEM — R42 VERTIGO: Status: ACTIVE | Noted: 2021-03-01

## 2023-07-26 PROBLEM — H81.4 VERTIGO OF CENTRAL ORIGIN: Status: ACTIVE | Noted: 2022-07-11

## 2023-07-26 PROBLEM — F33.9 MAJOR DEPRESSIVE DISORDER, RECURRENT, UNSPECIFIED (CMS-HCC): Status: ACTIVE | Noted: 2021-04-20

## 2023-07-26 PROBLEM — M19.019 PRIMARY OSTEOARTHRITIS, UNSPECIFIED SHOULDER: Status: ACTIVE | Noted: 2023-07-26

## 2023-07-26 NOTE — PROGRESS NOTES
Patient ID: Lenora Ames is a 68 y.o. female who is acute skilled care being seen and evaluated for multiple medical problems.  [id number]   1955    /70   Pulse 76   Temp 36.8 °C (98.3 °F)   Resp 15   Wt 72.6 kg (160 lb)   SpO2 97%   BMI 27.46 kg/m²     Assessment/Plan  Problem List Items Addressed This Visit       Anxiety and depression     Lexapro 10 mg by mouth daily          Diabetes mellitus (CMS/HCC)     Metformin 1000 mg by mouth BID   Glargine 30 units subcutaneous daily          Hypercholesteremia - Primary     Atorvastatin 20 mg by mouth every HS         History of CVA (cerebrovascular accident)     RUE weakness   ASA 81 mg by mouth daily   PT/OT         Hypotension     Midodrine 5 mg by mouth every 8 hrs          Gastroesophageal reflux disease without esophagitis     Sucralfate 1 gram by mouth QID          Recurrent falls     PT/OT         Vertigo     Meclizine 25 mg by mouth every 8 hrs as needed  Vestibular therapy apt pending          Degenerative joint disease     Radha back and body 500/32,5 mg two tablets by mouth every 12 hrs as needed   Flexeril 10 mg by mouth every 8 hrs (Muscle spasms)          Insomnia     Melatonin 5 mg by mouth every HS          Physical deconditioning     PT/OT- falls              HPI: Client was admitted [facility] from 7/17/2023- 7/22/2023 with the final diagnosis of Dizziness, N/V, esophagitis, and elevated glucose level. PMH includes vertigo, HLD, HF (LVEF 60-65%), DM2, neuropathy, back pain, DDD,  depression, and CVA. WBC in ED 22. Head and Neck CT (-). CT PE (-). Client received IV Zosyn. Client to follow up at Vestibular clinic OP. Client denies HA. C/O vertigo. Denies CP, SOB, Cough, or increased edema. RA. Denies abdominal pain, N/V, diarrhea, or constipation. Denies dysuria. C/O urinary frequency.  Denies change in appetite. C/O chronic bilateral shoulder pain.     Review of Systems ROS as described in in HPI, Dysphasia since CVA.      Physical  Exam  Constitutional:       Comments: Sitting at bedside    HENT:      Mouth/Throat:      Mouth: Mucous membranes are moist.   Cardiovascular:      Rate and Rhythm: Normal rate.   Pulmonary:      Effort: Pulmonary effort is normal.      Breath sounds: Normal breath sounds.      Comments: RA  Abdominal:      General: Bowel sounds are normal.      Palpations: Abdomen is soft.   Genitourinary:     Comments: Urinary frequency   Musculoskeletal:      Cervical back: Neck supple.      Comments: H/O falls  Vertigo  RUE weakness since CVA  Walker    Skin:     General: Skin is warm and dry.   Neurological:      Mental Status: She is alert. Mental status is at baseline.      Comments: Dysphasia since CVA (Per client)    Psychiatric:         Mood and Affect: Mood normal.      Comments: Cooperative and follows direction

## 2023-07-26 NOTE — ASSESSMENT & PLAN NOTE
Radha back and body 500/32,5 mg two tablets by mouth every 12 hrs as needed   Flexeril 10 mg by mouth every 8 hrs (Muscle spasms)

## 2023-08-01 ENCOUNTER — NURSING HOME VISIT (OUTPATIENT)
Dept: POST ACUTE CARE | Facility: EXTERNAL LOCATION | Age: 68
End: 2023-08-01
Payer: MEDICAID

## 2023-08-01 DIAGNOSIS — E11.59 TYPE 2 DIABETES MELLITUS WITH OTHER CIRCULATORY COMPLICATION, WITH LONG-TERM CURRENT USE OF INSULIN (MULTI): Primary | ICD-10-CM

## 2023-08-01 DIAGNOSIS — R29.6 RECURRENT FALLS: ICD-10-CM

## 2023-08-01 DIAGNOSIS — Z86.73 HISTORY OF CVA (CEREBROVASCULAR ACCIDENT): ICD-10-CM

## 2023-08-01 DIAGNOSIS — Z79.4 TYPE 2 DIABETES MELLITUS WITH OTHER CIRCULATORY COMPLICATION, WITH LONG-TERM CURRENT USE OF INSULIN (MULTI): Primary | ICD-10-CM

## 2023-08-01 DIAGNOSIS — R53.81 PHYSICAL DECONDITIONING: ICD-10-CM

## 2023-08-01 DIAGNOSIS — M19.90 OSTEOARTHRITIS, UNSPECIFIED OSTEOARTHRITIS TYPE, UNSPECIFIED SITE: ICD-10-CM

## 2023-08-01 PROCEDURE — 99308 SBSQ NF CARE LOW MDM 20: CPT | Performed by: NURSE PRACTITIONER

## 2023-08-01 NOTE — LETTER
Patient: Lenora Ames  : 1955    Encounter Date: 2023    Patient ID: Lenora Ames is a 68 y.o. female who is acute skilled care being seen and evaluated for multiple medical problems.  [id number]   1955    /68   Pulse 80   Temp 36.7 °C (98.1 °F)   Resp 15   Wt 72.6 kg (160 lb)   SpO2 96%   BMI 27.46 kg/m²     Assessment/Plan  Problem List Items Addressed This Visit       Diabetes mellitus (CMS/[facility]) - Primary     Metformin 1000 mg by mouth BID   Glargine 30 units subcutaneous daily   Humalog SSC AC as prescribed          History of CVA (cerebrovascular accident)     RUE weakness   ASA 81 mg by mouth daily   PT/OT         Recurrent falls     PT/OT          Degenerative joint disease     Radha back and body 500/32,5 mg two tablets by mouth every 12 hrs as needed   Flexeril 10 mg by mouth every 8 hrs (Muscle spasms         Physical deconditioning     PT/OT- falls               HPI: Client continues to receive PT/OT services. Humalog SSC added per client request, home medication. Pending DC to home 8/3/2023. Client denies HA, dizziness, or lightheadedness. Denies CP, SOB, Cough, or increased edema. Denies abdominal pain, N/V, diarrhea, or constipation. Denies dysuria. Denies change in appetite. Denies insomnia. No current C/O pain.     Review of Systems ROS as described in in HPI     Physical Exam  Constitutional:       Comments: Eating lunch    HENT:      Mouth/Throat:      Mouth: Mucous membranes are moist.   Cardiovascular:      Rate and Rhythm: Normal rate.   Pulmonary:      Effort: Pulmonary effort is normal.      Comments: RA  Abdominal:      General: Bowel sounds are normal.   Genitourinary:     Comments: Denies dysuria   Musculoskeletal:      Cervical back: Neck supple.      Comments: PT/OT   Skin:     General: Skin is warm and dry.   Neurological:      Mental Status: She is alert. Mental status is at baseline.   Psychiatric:         Mood and Affect: Mood normal.       Comments: Pleasant                    Electronically Signed By: SHAMAR Espinoza-CNP   8/2/23  5:10 PM

## 2023-08-02 VITALS
TEMPERATURE: 98.1 F | HEART RATE: 80 BPM | DIASTOLIC BLOOD PRESSURE: 66 MMHG | WEIGHT: 160 LBS | SYSTOLIC BLOOD PRESSURE: 112 MMHG | BODY MASS INDEX: 27.46 KG/M2 | OXYGEN SATURATION: 96 % | RESPIRATION RATE: 15 BRPM

## 2023-08-02 PROBLEM — R91.8 LUNG MASS: Status: ACTIVE | Noted: 2022-07-11

## 2023-08-02 NOTE — ASSESSMENT & PLAN NOTE
Radha back and body 500/32,5 mg two tablets by mouth every 12 hrs as needed   Flexeril 10 mg by mouth every 8 hrs (Muscle spasms

## 2023-08-02 NOTE — ASSESSMENT & PLAN NOTE
Metformin 1000 mg by mouth BID   Glargine 30 units subcutaneous daily   Humalog SSC AC as prescribed

## 2023-08-02 NOTE — PROGRESS NOTES
Patient ID: Lenora Ames is a 68 y.o. female who is acute skilled care being seen and evaluated for multiple medical problems.  [id number]   1955    /68   Pulse 80   Temp 36.7 °C (98.1 °F)   Resp 15   Wt 72.6 kg (160 lb)   SpO2 96%   BMI 27.46 kg/m²     Assessment/Plan  Problem List Items Addressed This Visit       Diabetes mellitus (CMS/HCC) - Primary     Metformin 1000 mg by mouth BID   Glargine 30 units subcutaneous daily   Humalog SSC AC as prescribed          History of CVA (cerebrovascular accident)     RUE weakness   ASA 81 mg by mouth daily   PT/OT         Recurrent falls     PT/OT          Degenerative joint disease     Radha back and body 500/32,5 mg two tablets by mouth every 12 hrs as needed   Flexeril 10 mg by mouth every 8 hrs (Muscle spasms         Physical deconditioning     PT/OT- falls               HPI: Client continues to receive PT/OT services. Humalog SSC added per client request, home medication. Pending DC to home 8/3/2023. Client denies HA, dizziness, or lightheadedness. Denies CP, SOB, Cough, or increased edema. Denies abdominal pain, N/V, diarrhea, or constipation. Denies dysuria. Denies change in appetite. Denies insomnia. No current C/O pain.     Review of Systems ROS as described in in HPI     Physical Exam  Constitutional:       Comments: Eating lunch    HENT:      Mouth/Throat:      Mouth: Mucous membranes are moist.   Cardiovascular:      Rate and Rhythm: Normal rate.   Pulmonary:      Effort: Pulmonary effort is normal.      Comments: RA  Abdominal:      General: Bowel sounds are normal.   Genitourinary:     Comments: Denies dysuria   Musculoskeletal:      Cervical back: Neck supple.      Comments: PT/OT   Skin:     General: Skin is warm and dry.   Neurological:      Mental Status: She is alert. Mental status is at baseline.   Psychiatric:         Mood and Affect: Mood normal.      Comments: Pleasant

## 2023-08-15 ENCOUNTER — OFFICE VISIT (OUTPATIENT)
Dept: PRIMARY CARE | Facility: CLINIC | Age: 68
End: 2023-08-15
Payer: MEDICAID

## 2023-08-15 ENCOUNTER — LAB (OUTPATIENT)
Dept: LAB | Facility: LAB | Age: 68
End: 2023-08-15
Payer: MEDICAID

## 2023-08-15 VITALS
BODY MASS INDEX: 27.42 KG/M2 | HEIGHT: 64 IN | WEIGHT: 160.6 LBS | SYSTOLIC BLOOD PRESSURE: 93 MMHG | DIASTOLIC BLOOD PRESSURE: 63 MMHG | OXYGEN SATURATION: 92 % | RESPIRATION RATE: 18 BRPM | HEART RATE: 97 BPM

## 2023-08-15 DIAGNOSIS — K21.9 GASTROESOPHAGEAL REFLUX DISEASE WITHOUT ESOPHAGITIS: ICD-10-CM

## 2023-08-15 DIAGNOSIS — E11.649 UNCONTROLLED TYPE 2 DIABETES MELLITUS WITH HYPOGLYCEMIA, UNSPECIFIED HYPOGLYCEMIA COMA STATUS (MULTI): ICD-10-CM

## 2023-08-15 DIAGNOSIS — N39.498 OTHER URINARY INCONTINENCE: ICD-10-CM

## 2023-08-15 DIAGNOSIS — I65.22 STENOSIS OF LEFT CAROTID ARTERY: ICD-10-CM

## 2023-08-15 DIAGNOSIS — Z86.73 HISTORY OF CVA (CEREBROVASCULAR ACCIDENT): ICD-10-CM

## 2023-08-15 DIAGNOSIS — I65.22 CAROTID STENOSIS, LEFT: ICD-10-CM

## 2023-08-15 DIAGNOSIS — R51.9 NONINTRACTABLE HEADACHE, UNSPECIFIED CHRONICITY PATTERN, UNSPECIFIED HEADACHE TYPE: ICD-10-CM

## 2023-08-15 DIAGNOSIS — R42 POSTURAL DIZZINESS: ICD-10-CM

## 2023-08-15 DIAGNOSIS — E11.49 OTHER DIABETIC NEUROLOGICAL COMPLICATION ASSOCIATED WITH TYPE 2 DIABETES MELLITUS (MULTI): ICD-10-CM

## 2023-08-15 DIAGNOSIS — Z09 HOSPITAL DISCHARGE FOLLOW-UP: ICD-10-CM

## 2023-08-15 DIAGNOSIS — R25.1 TREMOR: ICD-10-CM

## 2023-08-15 DIAGNOSIS — F32.A ANXIETY AND DEPRESSION: ICD-10-CM

## 2023-08-15 DIAGNOSIS — F41.9 ANXIETY AND DEPRESSION: ICD-10-CM

## 2023-08-15 LAB
C REACTIVE PROTEIN (MG/L) IN SER/PLAS: 0.25 MG/DL
POC APPEARANCE, URINE: CLEAR
POC BILIRUBIN, URINE: NEGATIVE
POC BLOOD, URINE: NEGATIVE
POC COLOR, URINE: YELLOW
POC GLUCOSE, URINE: NEGATIVE MG/DL
POC HEMOGLOBIN A1C: 7.3 % (ref 4.2–6.5)
POC KETONES, URINE: NEGATIVE MG/DL
POC LEUKOCYTES, URINE: NEGATIVE
POC NITRITE,URINE: NEGATIVE
POC PH, URINE: 7.5 PH
POC PROTEIN, URINE: NEGATIVE MG/DL
POC SPECIFIC GRAVITY, URINE: 1.02
POC UROBILINOGEN, URINE: 0.2 EU/DL
SEDIMENTATION RATE, ERYTHROCYTE: 33 MM/H (ref 0–30)

## 2023-08-15 PROCEDURE — 81003 URINALYSIS AUTO W/O SCOPE: CPT | Performed by: INTERNAL MEDICINE

## 2023-08-15 PROCEDURE — 3074F SYST BP LT 130 MM HG: CPT | Performed by: INTERNAL MEDICINE

## 2023-08-15 PROCEDURE — 36415 COLL VENOUS BLD VENIPUNCTURE: CPT

## 2023-08-15 PROCEDURE — 3078F DIAST BP <80 MM HG: CPT | Performed by: INTERNAL MEDICINE

## 2023-08-15 PROCEDURE — 99214 OFFICE O/P EST MOD 30 MIN: CPT | Performed by: INTERNAL MEDICINE

## 2023-08-15 PROCEDURE — 4004F PT TOBACCO SCREEN RCVD TLK: CPT | Performed by: INTERNAL MEDICINE

## 2023-08-15 PROCEDURE — 1160F RVW MEDS BY RX/DR IN RCRD: CPT | Performed by: INTERNAL MEDICINE

## 2023-08-15 PROCEDURE — 86140 C-REACTIVE PROTEIN: CPT

## 2023-08-15 PROCEDURE — 85652 RBC SED RATE AUTOMATED: CPT

## 2023-08-15 PROCEDURE — 1125F AMNT PAIN NOTED PAIN PRSNT: CPT | Performed by: INTERNAL MEDICINE

## 2023-08-15 PROCEDURE — 83036 HEMOGLOBIN GLYCOSYLATED A1C: CPT | Performed by: INTERNAL MEDICINE

## 2023-08-15 PROCEDURE — 3046F HEMOGLOBIN A1C LEVEL >9.0%: CPT | Performed by: INTERNAL MEDICINE

## 2023-08-15 PROCEDURE — 1159F MED LIST DOCD IN RCRD: CPT | Performed by: INTERNAL MEDICINE

## 2023-08-15 RX ORDER — ATORVASTATIN CALCIUM 20 MG/1
20 TABLET, FILM COATED ORAL NIGHTLY
COMMUNITY
Start: 2023-08-03 | End: 2023-09-25

## 2023-08-15 RX ORDER — INSULIN GLARGINE 100 [IU]/ML
30 INJECTION, SOLUTION SUBCUTANEOUS
COMMUNITY
Start: 2022-06-13 | End: 2023-09-25

## 2023-08-15 RX ORDER — PEN NEEDLE, DIABETIC 29 G X1/2"
3 NEEDLE, DISPOSABLE MISCELLANEOUS AS NEEDED
COMMUNITY
End: 2023-08-15 | Stop reason: SDUPTHER

## 2023-08-15 RX ORDER — PANTOPRAZOLE SODIUM 40 MG/1
40 TABLET, DELAYED RELEASE ORAL
Qty: 90 TABLET | Refills: 0 | Status: SHIPPED | OUTPATIENT
Start: 2023-08-15 | End: 2023-12-18

## 2023-08-15 RX ORDER — PEN NEEDLE, DIABETIC 29 G X1/2"
3 NEEDLE, DISPOSABLE MISCELLANEOUS DAILY
Qty: 300 EACH | Refills: 0 | Status: SHIPPED | OUTPATIENT
Start: 2023-08-15

## 2023-08-15 RX ORDER — DIVALPROEX SODIUM 125 MG/1
125 TABLET, DELAYED RELEASE ORAL 2 TIMES DAILY
Qty: 180 TABLET | Refills: 0 | Status: SHIPPED | OUTPATIENT
Start: 2023-08-15 | End: 2023-08-16 | Stop reason: ALTCHOICE

## 2023-08-15 RX ORDER — INSULIN LISPRO 100 [IU]/ML
INJECTION, SOLUTION INTRAVENOUS; SUBCUTANEOUS
COMMUNITY
End: 2023-09-25

## 2023-08-15 RX ORDER — METFORMIN HYDROCHLORIDE 500 MG/1
1000 TABLET, EXTENDED RELEASE ORAL 2 TIMES DAILY
COMMUNITY
End: 2023-09-25

## 2023-08-15 RX ORDER — DULOXETIN HYDROCHLORIDE 60 MG/1
60 CAPSULE, DELAYED RELEASE ORAL DAILY
Qty: 90 CAPSULE | Refills: 0 | Status: SHIPPED | OUTPATIENT
Start: 2023-08-15 | End: 2023-08-16 | Stop reason: ALTCHOICE

## 2023-08-15 RX ORDER — DIVALPROEX SODIUM 125 MG/1
125 TABLET, DELAYED RELEASE ORAL 2 TIMES DAILY
COMMUNITY
End: 2023-08-15 | Stop reason: SDUPTHER

## 2023-08-15 ASSESSMENT — PAIN SCALES - GENERAL: PAINLEVEL: 9

## 2023-08-15 NOTE — PROGRESS NOTES
"Patient ID:   Lenora Ames is a 68 y.o. female with PMH remarkable for DM2, CVA with RUE weakness, falls, DJD who presents to the office today for rehab follow up  (Was at [facility] ).    HEALTH MAINTENANCE: SNF ([facility]) DISCHARGE FOLLOW UP  Smoking: Current Smoker, 0.5ppd for many years  Mammogram (40-75): DUE - will order this AFTER we get the carotid stenosis addressed.   Labs: 7/18/2023 reviewed  - HgbA1c 7.3 today (was 10.3 on 3/6/2023)  - admitted [facility] from 7/17/2023- 7/22/2023 with the final diagnosis of Dizziness, N/V, esophagitis, and elevated glucose level. PMH includes vertigo, HLD, HF (LVEF 60-65%), DM2, neuropathy, back pain, DDD,  depression, and CVA. WBC in ED 22. Head and Neck CT (-). CT PE (-). Client received IV Zosyn. Client to follow up at [facility] OP.   - gastric emptying study 7/20/2023 showed borderline delayed gastric emptying of the solid phase meal. Gastroparesis cannot be excluded.   - CTA Chest, a/p on 7/18/2023 showed -ve PE. Circumferential esophageal wall thickening suggestive of esophagitis. Mild circumferential wall thickening at the urinary bladder, may be d/t under distention.  - CTH and Cspine from 7/18/2023 showed chronic microvascular ischemic  changes. Redemonstration of encephalomalacia at the left frontal and  occipital lobes, suggestive of remote infarcts. Mild reversald of the cervical lordosis. Anterior orthopedic hardware extending C4 through C6. Multilevel facet arthropathy.   -- pt reports that she has been home for 1.5wks ago  - reports that she can not read - \"I can see the word but I can not read\"  - reports that she has a headache for the past couple days  - will check ESR, CRP  - will reach out to Dr Silvestre to see if we can get her an appt asap  - obtained care taker's # for future phone calls  - having urinary urgency and some incontinence.  - urinalysis obtained in the office and was -ve. No culture needed.     Pertinent Work " Up:  - ECHO: done 3/6/2023. LVSF 60-65%, impaired relaxation.  -- CAROTID US: done 3/7/2023. SEVERE left internal carotid artery stenosis (>70%) with distal flow void concerning for possible occlusion. Right carotid findings were consistent with <50% of right proximal ICA.  -- MRI/A Brain, Head, Neck done 3/6/2023 showed Chronic ischemic changes on the left are new from 2021, however there is no acute intracranial pathology. Progressive narrowing of the left cervical ICA, now with near occlusion and diminished flow in the left anterior circulation. Additional areas of intracranial atherosclerotic disease are noted as described above, no proximal large vessel occlusion in the head. Atherosclerotic disease with moderate narrowing of the proximal right ICA, no other flow-limiting stenosis or occlusion in the neck.  - MRI/A Brain, Head, Neck from 5/26/2023 showed Lack of flow related enhancement within the left cervical internal carotid artery from the origin suggests occlusion or high-grade stenosis. Findings have worsened from the prior MRA 03/06/2023. Mild-to-moderate stenosis at the right carotid bifurcation.    Prior Hospitalization --  Pt presented to [facility] on 3/5 with c/o dizziness x 4 days, reported generalized weakness and inability to walk 2/2 dizziness. CTH showing infarct in the L frontal and L occipital lobes (new from 4/7/21), but otherwise chronic appearing. MRI/MRA negative for acute stroke, ECHO w/ normal LVSF with an EF of 60-65% w/ impaired relaxation pattern for LVDF, c/w aspirin and atorvastatin, zofran prn, meclizine scheduled, c/w midodrine, ENT & cardio consulted. Hyponatremia imp with IVF. HgbA1C 10.3 on 3/6/2023. Patient was discharged to [facility] on 3/9/23. Patient was then transferred to the ER on 3/11 after she fell onto her R arm and had c/o right arm pain. Work-up in ER was negative for fracture and she was discharged back to [place]. Pt was discharged from [facility] on  4/14/2023 and then came into the office to establish care. Pt was advised that she will need to see Vascular Surgery regarding the carotid stenosis and a referral was placed.   -- many attempts were placed to pt regarding an appt but she did not answer her phone that is on file.    SOCIAL HISTORY:  Social History     Tobacco Use    Smoking status: Every Day     Packs/day: 0.50     Types: Cigarettes    Smokeless tobacco: Never   Substance Use Topics    Alcohol use: Yes     Comment: occasional    Drug use: Yes     Types: Marijuana     Comment: daily use     IMMUNIZATIONS:  Immunization History   Administered Date(s) Administered    Moderna SARS-CoV-2 Vaccination 04/27/2021, 05/25/2021     REVIEW OF SYSTEMS:  Review of Systems   Constitutional:  Positive for fatigue.   Eyes:  Positive for visual disturbance.   Respiratory:  Positive for cough and shortness of breath.    Musculoskeletal:  Positive for arthralgias, gait problem and neck stiffness.   Neurological:  Positive for dizziness, speech difficulty, weakness, light-headedness and headaches.   Psychiatric/Behavioral:  The patient is nervous/anxious.    All other systems reviewed and are negative.    12 point review of systems negative unless stated above in HPI    ALLERGIES:  Allergies   Allergen Reactions    Rofecoxib Unknown      VITAL SIGNS:  Vitals:    08/15/23 1520   BP: 93/63   Pulse: 97   Resp: 18   SpO2: 92%       PHYSICAL EXAM:  Physical Exam  Vitals reviewed.   Constitutional:       General: She is awake. She is not in acute distress.     Appearance: Normal appearance. She is not ill-appearing.   HENT:      Head: Normocephalic and atraumatic.      Right Ear: Tympanic membrane and external ear normal. Decreased hearing noted.      Left Ear: Tympanic membrane and external ear normal. Decreased hearing noted.      Nose: Nose normal.      Mouth/Throat:      Mouth: Mucous membranes are moist.      Pharynx: Oropharynx is clear.   Eyes:      General: Visual  field deficit present.      Extraocular Movements: Extraocular movements intact.      Pupils: Pupils are equal, round, and reactive to light.   Neck:      Vascular: Decreased carotid pulses. Carotid bruit present.   Cardiovascular:      Rate and Rhythm: Normal rate and regular rhythm.      Heart sounds: Normal heart sounds. No murmur heard.  Pulmonary:      Effort: Pulmonary effort is normal. No respiratory distress.      Breath sounds: Decreased breath sounds present. No wheezing.   Abdominal:      General: There is no distension.      Palpations: Abdomen is soft. There is no mass.      Tenderness: There is no abdominal tenderness.   Musculoskeletal:         General: Normal range of motion.      Cervical back: Neck supple.      Comments: Generalized weakness   Skin:     General: Skin is warm and dry.      Findings: Ecchymosis present.   Neurological:      General: No focal deficit present.      Mental Status: She is alert. She is confused.      Cranial Nerves: Dysarthria present.      Sensory: No sensory deficit.      Motor: Weakness present.      Coordination: Coordination abnormal.      Gait: Gait abnormal.   Psychiatric:         Mood and Affect: Mood is anxious.         Speech: Speech is delayed.         Behavior: Behavior normal. Behavior is cooperative.         Cognition and Memory: Cognition is impaired.     MEDICATIONS:  Current Outpatient Medications on File Prior to Visit   Medication Sig Dispense Refill    aspirin 81 mg chewable tablet Chew 1 tablet (81 mg) once daily.      atorvastatin (Lipitor) 20 mg tablet Take 1 tablet (20 mg) by mouth once daily at bedtime.      cyclobenzaprine (Flexeril) 10 mg tablet Take 1 tablet (10 mg) by mouth 3 times a day as needed for muscle spasms.      escitalopram (Lexapro) 10 mg tablet Take 1 tablet (10 mg) by mouth once daily.      insulin glargine (Lantus) 100 unit/mL (3 mL) pen Inject 30 Units under the skin once daily.      insulin lispro (HumaLOG KwikPen Insulin) 100  unit/mL injection Inject under the skin 3 times a day with meals. Per sliding scale 3 times  daily      meclizine (Antivert) 25 mg tablet TAKE ONE TABLET BY MOUTH EVERY 8 HOURS AS NEEDED 90 tablet 0    melatonin 5 mg tablet Take 1 tablet (5 mg) by mouth once daily as needed for sleep.      metFORMIN XR (Glucophage-XR) 500 mg 24 hr tablet Take 2 tablets (1,000 mg) by mouth 2 times a day.      midodrine (Proamatine) 5 mg tablet Take 1 tablet (5 mg) by mouth 3 times a day. PRN FOR SBP LESS THAN OR EQUAL       sucralfate (Carafate) 100 mg/mL suspension Take 10 mL (1 g) by mouth 4 times a day.      Ventolin HFA 90 mcg/actuation inhaler INHALE 2 PUFFS BY MOUTH AS INSTRUCTED EVERY 4 HOURS AS NEEDED.      [DISCONTINUED] aspirin-caffeine 500-32.5 mg tablet Take by mouth 2 times a day as needed.      [DISCONTINUED] divalproex (Depakote) 125 mg EC tablet Take 1 tablet (125 mg) by mouth 2 times a day. Do not crush, chew, or split.      [DISCONTINUED] DULoxetine (Cymbalta) 60 mg DR capsule TAKE ONE CAPSULE BY MOUTH ONCE EVERY DAY 30 capsule 0    [DISCONTINUED] pantoprazole (ProtoNix) 40 mg EC tablet TAKE ONE TABLET BY MOUTH EVERY MORNING 90 tablet 0    [DISCONTINUED] divalproex (Depakote) 125 mg EC tablet TAKE ONE TABLET BY MOUTH TWO TIMES A DAY 60 tablet 0    [DISCONTINUED] dulaglutide (Trulicity) 1.5 mg/0.5 mL pen injector Inject 1.5 mg under the skin 1 (one) time per week. 2 mL 2    [DISCONTINUED] escitalopram (Lexapro) 20 mg tablet TAKE ONE TABLET BY MOUTH once DAILY 30 tablet 0    [DISCONTINUED] insulin lispro (HumaLOG U-100 Insulin) 100 unit/mL injection Take as directed per insulin instructions. 10 mL 12    [DISCONTINUED] ipratropium-albuteroL (Duo-Neb) 0.5-2.5 mg/3 mL nebulizer solution INHALE THE CONTENTS OF 1 VIAL (3 ML) VIA NEBULIZER AS INSTRUCTED EVERY 4 HOURS AS NEEDED   INHALE OVER 5 TO 15 MINUTES      [DISCONTINUED] Lantus Solostar U-100 Insulin 100 unit/mL (3 mL) pen INJECT 30 UNITS SUBCUTANEOUSLY ONCE  "DAILY 3 mL 2    [DISCONTINUED] pen needle 1/2\" (Comfort EZ Pen Needles) 29G X 12mm needle Inject 3 each under the skin if needed. Use as instructed      [DISCONTINUED] rosuvastatin (Crestor) 20 mg tablet Take 1 tablet (20 mg) by mouth once daily.       No current facility-administered medications on file prior to visit.      LABORATORY DATA:  Lab Results   Component Value Date    WBC 10.4 07/21/2023    HGB 14.2 07/21/2023    HCT 42.7 07/21/2023     07/21/2023    CHOL 118 03/06/2023    TRIG 301 (H) 03/06/2023    HDL 34.0 (A) 03/06/2023    ALT 15 07/17/2023    AST 25 07/17/2023     (L) 07/22/2023    K 3.7 07/22/2023    CL 97 (L) 07/22/2023    CREATININE 0.80 07/22/2023    BUN 19 07/22/2023    CO2 28 07/22/2023    INR 1.0 03/05/2023    HGBA1C 7.3 (A) 08/15/2023       ASSESSMENT AND PLAN:  Assessment/Plan   Diagnoses and all orders for this visit:  Hospital discharge follow-up  Stenosis of left carotid artery  Comments:  - will reach out to Dr Silvestre regarding obtaining an appt and call care giver with information  - c/w baby ASA and statin  Orders:  -     Referral to Vascular Surgery; Future  -     Sedimentation Rate; Future  -     C-reactive protein; Future  Uncontrolled type 2 diabetes mellitus with hypoglycemia, unspecified hypoglycemia coma status (CMS/HCC)  Comments:  - improving  - c/w humalog SSI TID, lantus 30u qHS  - c/w metformin XR 1g BID  - STOP trulicity  Orders:  -     POCT glycosylated hemoglobin (Hb A1C) manually resulted  -     pen needle 1/2\" (Comfort EZ Pen Needles) 29G X 12mm needle; Inject 3 each under the skin once daily. Use as instructed  Anxiety and depression  Comments:  - c/w lexapro  - prev admit to [facility] in April 2023  History of CVA (cerebrovascular accident)  Comments:  - c/w baby ASA and statin  Other diabetic neurological complication associated with type 2 diabetes mellitus (CMS/HCC)  Gastroesophageal reflux disease without esophagitis  Comments:  - c/w carafate and " PPI  Orders:  -     pantoprazole (ProtoNix) 40 mg EC tablet; Take 1 tablet (40 mg) by mouth once daily in the morning. Take before meals. Do not crush, chew, or split.  Tremor  Other urinary incontinence  -     POCT UA Automated manually resulted  Carotid stenosis, left  -     Sedimentation Rate; Future  -     C-reactive protein; Future  Nonintractable headache, unspecified chronicity pattern, unspecified headache type  Comments:  - check ESR, CRP to rule out Giant cell  Orders:  -     Referral to Vascular Surgery; Future  -     Sedimentation Rate; Future  -     C-reactive protein; Future  Postural dizziness  Comments:  - c/w meclizine PRN  - FU with Vascular asap      --------------------  Written by Nellie Graham RN, acting as a scribe for Dr. Harris. This note accurately reflects the work and decisions made by Dr. Harris.     I, Dr. Harris, attest all medical record entries made by the scribe were under my direction and were personally dictated by me. I have reviewed the chart and agree that the record accurately reflects my performance of the history, physical exam, and assessment and plan.

## 2023-08-16 ASSESSMENT — ENCOUNTER SYMPTOMS
FATIGUE: 1
LIGHT-HEADEDNESS: 1
ARTHRALGIAS: 1
HEADACHES: 1
NECK STIFFNESS: 1
COUGH: 1
NERVOUS/ANXIOUS: 1
DIZZINESS: 1
SHORTNESS OF BREATH: 1
SPEECH DIFFICULTY: 1
WEAKNESS: 1

## 2023-08-23 ENCOUNTER — LAB (OUTPATIENT)
Dept: LAB | Facility: LAB | Age: 68
End: 2023-08-23
Payer: MEDICAID

## 2023-08-23 DIAGNOSIS — E87.6 HYPOKALEMIA: ICD-10-CM

## 2023-08-23 LAB
ANION GAP IN SER/PLAS: 15 MMOL/L (ref 10–20)
CALCIUM (MG/DL) IN SER/PLAS: 9.6 MG/DL (ref 8.6–10.3)
CARBON DIOXIDE, TOTAL (MMOL/L) IN SER/PLAS: 27 MMOL/L (ref 21–32)
CHLORIDE (MMOL/L) IN SER/PLAS: 100 MMOL/L (ref 98–107)
CREATININE (MG/DL) IN SER/PLAS: 0.67 MG/DL (ref 0.5–1.05)
GFR FEMALE: >90 ML/MIN/1.73M2
GLUCOSE (MG/DL) IN SER/PLAS: 258 MG/DL (ref 74–99)
POTASSIUM (MMOL/L) IN SER/PLAS: 3.7 MMOL/L (ref 3.5–5.3)
SODIUM (MMOL/L) IN SER/PLAS: 138 MMOL/L (ref 136–145)
UREA NITROGEN (MG/DL) IN SER/PLAS: 16 MG/DL (ref 6–23)

## 2023-08-23 PROCEDURE — 80048 BASIC METABOLIC PNL TOTAL CA: CPT

## 2023-08-23 PROCEDURE — 36415 COLL VENOUS BLD VENIPUNCTURE: CPT

## 2023-08-28 ENCOUNTER — PATIENT OUTREACH (OUTPATIENT)
Dept: PRIMARY CARE | Facility: CLINIC | Age: 68
End: 2023-08-28
Payer: MEDICAID

## 2023-08-28 NOTE — PROGRESS NOTES
Patient has met target of no readmission for (90) days post hospital discharge and is graduated from Transitional Care Management program at this time.  Allie Worthington LPN

## 2023-09-25 DIAGNOSIS — E11.649 UNCONTROLLED TYPE 2 DIABETES MELLITUS WITH HYPOGLYCEMIA WITHOUT COMA (MULTI): ICD-10-CM

## 2023-09-25 DIAGNOSIS — M75.101 ROTATOR CUFF SYNDROME OF RIGHT SHOULDER: ICD-10-CM

## 2023-09-25 DIAGNOSIS — E78.00 HYPERCHOLESTEREMIA: ICD-10-CM

## 2023-09-25 DIAGNOSIS — R42 DIZZINESS: ICD-10-CM

## 2023-09-25 DIAGNOSIS — F33.9 EPISODE OF RECURRENT MAJOR DEPRESSIVE DISORDER, UNSPECIFIED DEPRESSION EPISODE SEVERITY (CMS-HCC): ICD-10-CM

## 2023-09-25 DIAGNOSIS — I95.9 HYPOTENSION, UNSPECIFIED HYPOTENSION TYPE: ICD-10-CM

## 2023-09-25 DIAGNOSIS — K21.9 GASTROESOPHAGEAL REFLUX DISEASE WITHOUT ESOPHAGITIS: ICD-10-CM

## 2023-09-25 RX ORDER — CYCLOBENZAPRINE HCL 10 MG
10 TABLET ORAL 3 TIMES DAILY
Qty: 90 TABLET | Refills: 0 | Status: SHIPPED | OUTPATIENT
Start: 2023-09-25 | End: 2024-03-20 | Stop reason: SDUPTHER

## 2023-09-25 RX ORDER — METFORMIN HYDROCHLORIDE 500 MG/1
1000 TABLET, EXTENDED RELEASE ORAL 2 TIMES DAILY
Qty: 120 TABLET | Refills: 0 | Status: SHIPPED | OUTPATIENT
Start: 2023-09-25 | End: 2023-11-15

## 2023-09-25 RX ORDER — ATORVASTATIN CALCIUM 20 MG/1
20 TABLET, FILM COATED ORAL NIGHTLY
Qty: 30 TABLET | Refills: 0 | Status: SHIPPED | OUTPATIENT
Start: 2023-09-25 | End: 2023-11-15

## 2023-09-25 RX ORDER — INSULIN GLARGINE 100 [IU]/ML
30 INJECTION, SOLUTION SUBCUTANEOUS NIGHTLY
Qty: 9 ML | Refills: 0 | Status: SHIPPED | OUTPATIENT
Start: 2023-09-25 | End: 2023-11-15

## 2023-09-25 RX ORDER — ESCITALOPRAM OXALATE 10 MG/1
10 TABLET ORAL DAILY
Qty: 30 TABLET | Refills: 0 | Status: SHIPPED | OUTPATIENT
Start: 2023-09-25 | End: 2023-11-15

## 2023-09-25 RX ORDER — INSULIN LISPRO 100 [IU]/ML
INJECTION, SOLUTION INTRAVENOUS; SUBCUTANEOUS
Qty: 15 ML | Refills: 0 | Status: SHIPPED | OUTPATIENT
Start: 2023-09-25

## 2023-09-25 RX ORDER — MECLIZINE HYDROCHLORIDE 25 MG/1
TABLET ORAL
Qty: 90 TABLET | Refills: 0 | Status: SHIPPED | OUTPATIENT
Start: 2023-09-25 | End: 2023-10-23

## 2023-09-25 RX ORDER — SUCRALFATE 1 G/1
1 TABLET ORAL 4 TIMES DAILY
Qty: 120 TABLET | Refills: 0 | Status: SHIPPED | OUTPATIENT
Start: 2023-09-25 | End: 2023-12-21

## 2023-09-25 RX ORDER — MIDODRINE HYDROCHLORIDE 5 MG/1
5 TABLET ORAL 3 TIMES DAILY
Qty: 90 TABLET | Refills: 0 | Status: SHIPPED | OUTPATIENT
Start: 2023-09-25

## 2023-10-22 DIAGNOSIS — R42 DIZZINESS: ICD-10-CM

## 2023-10-23 RX ORDER — MECLIZINE HYDROCHLORIDE 25 MG/1
TABLET ORAL
Qty: 90 TABLET | Refills: 0 | Status: SHIPPED | OUTPATIENT
Start: 2023-10-23

## 2023-11-14 DIAGNOSIS — E11.649 UNCONTROLLED TYPE 2 DIABETES MELLITUS WITH HYPOGLYCEMIA WITHOUT COMA (MULTI): ICD-10-CM

## 2023-11-14 DIAGNOSIS — E78.00 HYPERCHOLESTEREMIA: ICD-10-CM

## 2023-11-14 DIAGNOSIS — F33.9 EPISODE OF RECURRENT MAJOR DEPRESSIVE DISORDER, UNSPECIFIED DEPRESSION EPISODE SEVERITY (CMS-HCC): ICD-10-CM

## 2023-11-15 RX ORDER — METFORMIN HYDROCHLORIDE 500 MG/1
1000 TABLET, EXTENDED RELEASE ORAL 2 TIMES DAILY
Qty: 120 TABLET | Refills: 0 | Status: SHIPPED | OUTPATIENT
Start: 2023-11-15 | End: 2023-12-18

## 2023-11-15 RX ORDER — ATORVASTATIN CALCIUM 20 MG/1
20 TABLET, FILM COATED ORAL NIGHTLY
Qty: 30 TABLET | Refills: 0 | Status: SHIPPED | OUTPATIENT
Start: 2023-11-15 | End: 2023-12-18

## 2023-11-15 RX ORDER — INSULIN GLARGINE 100 [IU]/ML
30 INJECTION, SOLUTION SUBCUTANEOUS NIGHTLY
Qty: 9 ML | Refills: 0 | Status: SHIPPED | OUTPATIENT
Start: 2023-11-15 | End: 2023-12-18

## 2023-11-15 RX ORDER — ESCITALOPRAM OXALATE 10 MG/1
10 TABLET ORAL DAILY
Qty: 30 TABLET | Refills: 0 | Status: SHIPPED | OUTPATIENT
Start: 2023-11-15 | End: 2023-12-18

## 2023-11-24 ENCOUNTER — HOSPITAL ENCOUNTER (EMERGENCY)
Facility: HOSPITAL | Age: 68
Discharge: OTHER INSTITUTION | End: 2023-11-24
Attending: EMERGENCY MEDICINE
Payer: MEDICARE

## 2023-11-24 ENCOUNTER — APPOINTMENT (OUTPATIENT)
Dept: RADIOLOGY | Facility: HOSPITAL | Age: 68
End: 2023-11-24
Payer: MEDICARE

## 2023-11-24 VITALS
TEMPERATURE: 97.4 F | BODY MASS INDEX: 32.39 KG/M2 | WEIGHT: 165 LBS | HEART RATE: 101 BPM | SYSTOLIC BLOOD PRESSURE: 166 MMHG | HEIGHT: 60 IN | DIASTOLIC BLOOD PRESSURE: 87 MMHG | RESPIRATION RATE: 12 BRPM | OXYGEN SATURATION: 94 %

## 2023-11-24 LAB
ALBUMIN SERPL BCP-MCNC: 4.3 G/DL (ref 3.4–5)
ALP SERPL-CCNC: 89 U/L (ref 33–136)
ALT SERPL W P-5'-P-CCNC: 17 U/L (ref 7–45)
ANION GAP SERPL CALC-SCNC: 14 MMOL/L (ref 10–20)
APPEARANCE UR: ABNORMAL
AST SERPL W P-5'-P-CCNC: 17 U/L (ref 9–39)
BASOPHILS # BLD AUTO: 0.1 X10*3/UL (ref 0–0.1)
BASOPHILS NFR BLD AUTO: 0.9 %
BILIRUB SERPL-MCNC: 0.4 MG/DL (ref 0–1.2)
BILIRUB UR STRIP.AUTO-MCNC: NEGATIVE MG/DL
BNP SERPL-MCNC: 11 PG/ML (ref 0–99)
BUN SERPL-MCNC: 16 MG/DL (ref 6–23)
CALCIUM SERPL-MCNC: 10 MG/DL (ref 8.6–10.3)
CARDIAC TROPONIN I PNL SERPL HS: 4 NG/L (ref 0–13)
CHLORIDE SERPL-SCNC: 99 MMOL/L (ref 98–107)
CO2 SERPL-SCNC: 27 MMOL/L (ref 21–32)
COLOR UR: YELLOW
CREAT SERPL-MCNC: 0.79 MG/DL (ref 0.5–1.05)
EOSINOPHIL # BLD AUTO: 0.14 X10*3/UL (ref 0–0.7)
EOSINOPHIL NFR BLD AUTO: 1.2 %
ERYTHROCYTE [DISTWIDTH] IN BLOOD BY AUTOMATED COUNT: 14.2 % (ref 11.5–14.5)
FLUAV RNA RESP QL NAA+PROBE: NOT DETECTED
FLUBV RNA RESP QL NAA+PROBE: NOT DETECTED
GFR SERPL CREATININE-BSD FRML MDRD: 83 ML/MIN/1.73M*2
GLUCOSE SERPL-MCNC: 240 MG/DL (ref 74–99)
GLUCOSE UR STRIP.AUTO-MCNC: ABNORMAL MG/DL
HCT VFR BLD AUTO: 40.9 % (ref 36–46)
HGB BLD-MCNC: 13.5 G/DL (ref 12–16)
IMM GRANULOCYTES # BLD AUTO: 0.03 X10*3/UL (ref 0–0.7)
IMM GRANULOCYTES NFR BLD AUTO: 0.3 % (ref 0–0.9)
KETONES UR STRIP.AUTO-MCNC: ABNORMAL MG/DL
LACTATE SERPL-SCNC: 1.7 MMOL/L (ref 0.4–2)
LEUKOCYTE ESTERASE UR QL STRIP.AUTO: NEGATIVE
LIPASE SERPL-CCNC: 39 U/L (ref 9–82)
LYMPHOCYTES # BLD AUTO: 3.36 X10*3/UL (ref 1.2–4.8)
LYMPHOCYTES NFR BLD AUTO: 29 %
MCH RBC QN AUTO: 28.2 PG (ref 26–34)
MCHC RBC AUTO-ENTMCNC: 33 G/DL (ref 32–36)
MCV RBC AUTO: 86 FL (ref 80–100)
MONOCYTES # BLD AUTO: 0.87 X10*3/UL (ref 0.1–1)
MONOCYTES NFR BLD AUTO: 7.5 %
NEUTROPHILS # BLD AUTO: 7.09 X10*3/UL (ref 1.2–7.7)
NEUTROPHILS NFR BLD AUTO: 61.1 %
NITRITE UR QL STRIP.AUTO: NEGATIVE
NRBC BLD-RTO: 0 /100 WBCS (ref 0–0)
PH UR STRIP.AUTO: 7 [PH]
PLATELET # BLD AUTO: 282 X10*3/UL (ref 150–450)
POTASSIUM SERPL-SCNC: 4 MMOL/L (ref 3.5–5.3)
PROT SERPL-MCNC: 7.1 G/DL (ref 6.4–8.2)
PROT UR STRIP.AUTO-MCNC: NEGATIVE MG/DL
RBC # BLD AUTO: 4.78 X10*6/UL (ref 4–5.2)
RBC # UR STRIP.AUTO: NEGATIVE /UL
SODIUM SERPL-SCNC: 136 MMOL/L (ref 136–145)
SP GR UR STRIP.AUTO: 1.02
UROBILINOGEN UR STRIP.AUTO-MCNC: <2 MG/DL
WBC # BLD AUTO: 11.6 X10*3/UL (ref 4.4–11.3)

## 2023-11-24 PROCEDURE — 80053 COMPREHEN METABOLIC PANEL: CPT | Performed by: EMERGENCY MEDICINE

## 2023-11-24 PROCEDURE — 83605 ASSAY OF LACTIC ACID: CPT | Performed by: EMERGENCY MEDICINE

## 2023-11-24 PROCEDURE — 87502 INFLUENZA DNA AMP PROBE: CPT | Performed by: EMERGENCY MEDICINE

## 2023-11-24 PROCEDURE — 85025 COMPLETE CBC W/AUTO DIFF WBC: CPT | Performed by: EMERGENCY MEDICINE

## 2023-11-24 PROCEDURE — 84484 ASSAY OF TROPONIN QUANT: CPT | Performed by: EMERGENCY MEDICINE

## 2023-11-24 PROCEDURE — 96375 TX/PRO/DX INJ NEW DRUG ADDON: CPT | Mod: 59

## 2023-11-24 PROCEDURE — 99284 EMERGENCY DEPT VISIT MOD MDM: CPT | Mod: 25 | Performed by: EMERGENCY MEDICINE

## 2023-11-24 PROCEDURE — 81003 URINALYSIS AUTO W/O SCOPE: CPT | Performed by: EMERGENCY MEDICINE

## 2023-11-24 PROCEDURE — 83880 ASSAY OF NATRIURETIC PEPTIDE: CPT | Performed by: EMERGENCY MEDICINE

## 2023-11-24 PROCEDURE — 96374 THER/PROPH/DIAG INJ IV PUSH: CPT

## 2023-11-24 PROCEDURE — 74174 CTA ABD&PLVS W/CONTRAST: CPT

## 2023-11-24 PROCEDURE — 99285 EMERGENCY DEPT VISIT HI MDM: CPT | Mod: 25,27

## 2023-11-24 PROCEDURE — 2500000004 HC RX 250 GENERAL PHARMACY W/ HCPCS (ALT 636 FOR OP/ED): Mod: SE | Performed by: EMERGENCY MEDICINE

## 2023-11-24 PROCEDURE — 2550000001 HC RX 255 CONTRASTS: Mod: SE | Performed by: EMERGENCY MEDICINE

## 2023-11-24 PROCEDURE — 96361 HYDRATE IV INFUSION ADD-ON: CPT

## 2023-11-24 PROCEDURE — 74174 CTA ABD&PLVS W/CONTRAST: CPT | Performed by: RADIOLOGY

## 2023-11-24 PROCEDURE — 71275 CT ANGIOGRAPHY CHEST: CPT | Performed by: RADIOLOGY

## 2023-11-24 PROCEDURE — 83690 ASSAY OF LIPASE: CPT | Performed by: EMERGENCY MEDICINE

## 2023-11-24 PROCEDURE — 36415 COLL VENOUS BLD VENIPUNCTURE: CPT | Performed by: EMERGENCY MEDICINE

## 2023-11-24 RX ORDER — MORPHINE SULFATE 4 MG/ML
4 INJECTION, SOLUTION INTRAMUSCULAR; INTRAVENOUS ONCE
Status: COMPLETED | OUTPATIENT
Start: 2023-11-24 | End: 2023-11-24

## 2023-11-24 RX ORDER — ONDANSETRON HYDROCHLORIDE 2 MG/ML
4 INJECTION, SOLUTION INTRAVENOUS ONCE
Status: COMPLETED | OUTPATIENT
Start: 2023-11-24 | End: 2023-11-24

## 2023-11-24 RX ADMIN — ONDANSETRON 4 MG: 2 INJECTION INTRAMUSCULAR; INTRAVENOUS at 02:12

## 2023-11-24 RX ADMIN — IOHEXOL 100 ML: 350 INJECTION, SOLUTION INTRAVENOUS at 03:17

## 2023-11-24 RX ADMIN — SODIUM CHLORIDE 1000 ML: 9 INJECTION, SOLUTION INTRAVENOUS at 02:12

## 2023-11-24 RX ADMIN — MORPHINE SULFATE 4 MG: 4 INJECTION, SOLUTION INTRAMUSCULAR; INTRAVENOUS at 02:12

## 2023-11-24 ASSESSMENT — COLUMBIA-SUICIDE SEVERITY RATING SCALE - C-SSRS
6. HAVE YOU EVER DONE ANYTHING, STARTED TO DO ANYTHING, OR PREPARED TO DO ANYTHING TO END YOUR LIFE?: NO
1. IN THE PAST MONTH, HAVE YOU WISHED YOU WERE DEAD OR WISHED YOU COULD GO TO SLEEP AND NOT WAKE UP?: NO
2. HAVE YOU ACTUALLY HAD ANY THOUGHTS OF KILLING YOURSELF?: NO

## 2023-11-24 ASSESSMENT — PAIN SCALES - GENERAL: PAINLEVEL_OUTOF10: 9

## 2023-11-24 ASSESSMENT — PAIN DESCRIPTION - PROGRESSION: CLINICAL_PROGRESSION: NOT CHANGED

## 2023-11-24 ASSESSMENT — PAIN - FUNCTIONAL ASSESSMENT: PAIN_FUNCTIONAL_ASSESSMENT: 0-10

## 2023-11-24 NOTE — ED NOTES
RN notified that pt appears to have removed IV & left while RN was 1:1 with another patient.     Oralia Rivera RN  11/24/23 0514

## 2023-11-24 NOTE — ED PROVIDER NOTES
HPI   Chief Complaint   Patient presents with    Abdominal Pain    Dizziness    Nausea    Shortness of Breath       HPI  Patient is a 68-year-old female presenting to the ED today for abdominal pain.  Patient explains that she started having periumbilical abdominal pain upon waking up this morning.  She notes that throughout the day, she has had fatigue, nausea, and intermittent shortness of breath.  She does have history of COPD and took her home breathing treatment with improvement of her shortness of breath.  She also reports chills, nausea, and vomiting.  She has had about 3 episodes of vomiting throughout the day today.  She notes that with her third episode of vomiting, there was blood present, which prompted her to come to the ED for further evaluation.  At this time, she continues complaining of periumbilical abdominal pain.  She has not taken anything at home for her pain.                  Jacob Coma Scale Score: 15                  Patient History   Past Medical History:   Diagnosis Date    Esophagitis     Gastroenteritis     Other conditions influencing health status     Disc herniation    Pneumonia 03/25/2023    Proteinuria, unspecified     Proteinuria    Yeast infection 03/25/2023     Past Surgical History:   Procedure Laterality Date    CT ANGIO NECK  8/24/2023    CT NECK ANGIO W AND WO IV CONTRAST 8/24/2023 [facility]    CT HEAD ANGIO W AND WO IV CONTRAST  8/24/2023    CT HEAD ANGIO W AND WO IV CONTRAST 8/24/2023 [facility]    MR HEAD ANGIO WO IV CONTRAST  03/11/2014    MR HEAD ANGIO WO IV CONTRAST 3/11/2014 [facility]    MR HEAD ANGIO WO IV CONTRAST  04/19/2021    MR HEAD ANGIO WO IV CONTRAST 4/19/2021 [facility]    MR HEAD ANGIO WO IV CONTRAST  03/06/2023    MR HEAD ANGIO WO IV CONTRAST [facility]    MR HEAD ANGIO WO IV CONTRAST  5/26/2023    MR HEAD ANGIO WO IV CONTRAST 5/26/2023 [facility]    MR NECK ANGIO WO IV CONTRAST  03/11/2014    MR NECK ANGIO WO IV CONTRAST 3/11/2014 [facility]    MR  NECK ANGIO WO IV CONTRAST  04/19/2021    MR NECK ANGIO WO IV CONTRAST 4/19/2021 [facility]    MR NECK ANGIO WO IV CONTRAST  03/06/2023    MR NECK ANGIO WO IV CONTRAST [facility]    MR NECK ANGIO WO IV CONTRAST  5/26/2023    MR NECK ANGIO WO IV CONTRAST 5/26/2023 [facility]    OTHER SURGICAL HISTORY      excision of multiple cysts     Family History   Problem Relation Name Age of Onset    Aneurysm Mother      Lung cancer Father      Ovarian cancer Other       Social History     Tobacco Use    Smoking status: Every Day     Packs/day: .5     Types: Cigarettes    Smokeless tobacco: Never   Substance Use Topics    Alcohol use: Yes     Comment: occasional    Drug use: Yes     Types: Marijuana     Comment: daily use       Physical Exam   ED Triage Vitals [11/24/23 0222]   Temp Heart Rate Resp BP   36.3 °C (97.4 °F) 107 (!) 30 (!) 190/130      SpO2 Temp Source Heart Rate Source Patient Position   94 % Temporal -- --      BP Location FiO2 (%)     -- --       Physical Exam  Vitals and nursing note reviewed.   Constitutional:       Appearance: She is not toxic-appearing.   HENT:      Head: Normocephalic.      Mouth/Throat:      Mouth: Mucous membranes are moist.   Eyes:      Extraocular Movements: Extraocular movements intact.      Conjunctiva/sclera: Conjunctivae normal.   Cardiovascular:      Rate and Rhythm: Regular rhythm. Tachycardia present.   Pulmonary:      Effort: Pulmonary effort is normal. No respiratory distress.      Breath sounds: Normal breath sounds. No wheezing.   Abdominal:      General: There is no distension.      Palpations: Abdomen is soft.      Tenderness: There is abdominal tenderness (Periumbilical tenderness to palpation). There is no guarding or rebound.   Musculoskeletal:         General: No swelling.      Cervical back: Neck supple.   Skin:     General: Skin is warm and dry.      Capillary Refill: Capillary refill takes less than 2 seconds.   Neurological:      General: No focal deficit  present.      Mental Status: She is alert. Mental status is at baseline.         ED Course & MDM   ED Course as of 11/24/23 0514   Fri Nov 24, 2023   0201 EKG obtained at 154, interpreted by myself.  Sinus tachycardia with a ventricular rate of 106, left axis deviation, normal intervals, with no acute ischemic changes [VT]      ED Course User Index  [VT] Flori NUNO MD       Medical Decision Making  Patient was seen and evaluated for abdominal pain.  Differential diagnosis includes but is not limited to Gastritis, Appendicitis, Bowel Obstruction, AAA, Dissection, Viral Illness, Cholecystitis, Hernia, Colitis, Pancreatitis, Hepatitis, PUD, Ureteral stone, Perforated viscus, Diverticulitis, UTI.  Peripheral IV is established and patient is started on a 1 L bolus of normal saline.  She is administered morphine 4 mg IV and Zofran 4 mg IV for pain and nausea.  Additional labs and imaging are ordered for further evaluation of the patient's symptoms.  CBC shows mild leukocytosis with WBC of 11.6, otherwise unremarkable.  CMP is unremarkable.  Lactic acid is normal at 1.7.  Lipase is normal at 39.  High-sensitivity troponin is normal at 4.  BNP is normal at 11.  Urinalysis is not indicative of infection.  Influenza swabs are negative.  Patient refused to be tested for COVID-19.  CT angio chest abdomen pelvis   Final Result   No evidence of aortic aneurysm or dissection. No evidence of   extravasation of contrast in the bowel lumen to suggest acute active   gastrointestinal bleed.        Atherosclerotic disease with stable segment of mural thrombus of the   infrarenal abdominal aorta with approximately 50% stenosis. There is   an accessory left renal artery which supplies the lower pole of the   left kidney and arises off the aorta at the described segment of   thrombus and there is evidence of narrowing of the origin of the   accessory left renal artery, however the accessory renal artery is   patent distally. The left  main renal artery is grossly patent.        MACRO:   None        Signed by: Jair  11/24/2023 3:47 AM   Dictation workstation:   [id number]      While here in the ED, patient did start becoming hypoxic in the 80s, shortly after being administered the morphine.  She was placed on 2 L nasal cannula at that time.  On reevaluation, patient is resting comfortably in bed.  At this time, patient was trialed on room air.  Patient was informed of her lab and imaging results, and all questions and concerns were answered.  When I went back to reevaluate the patient to see how she was doing on room air, patient was no longer in the room.  Her IV and blood was found on the ground.  Patient had apparently eloped without treatment completed.    Procedure  Procedures     Flori NUNO MD  11/24/23 0599

## 2023-11-30 ENCOUNTER — OFFICE VISIT (OUTPATIENT)
Dept: PRIMARY CARE | Facility: CLINIC | Age: 68
End: 2023-11-30
Payer: MEDICARE

## 2023-11-30 VITALS
WEIGHT: 165 LBS | DIASTOLIC BLOOD PRESSURE: 57 MMHG | SYSTOLIC BLOOD PRESSURE: 94 MMHG | HEART RATE: 86 BPM | RESPIRATION RATE: 20 BRPM | BODY MASS INDEX: 32.22 KG/M2 | OXYGEN SATURATION: 92 %

## 2023-11-30 DIAGNOSIS — Z12.31 ENCOUNTER FOR SCREENING MAMMOGRAM FOR MALIGNANT NEOPLASM OF BREAST: ICD-10-CM

## 2023-11-30 DIAGNOSIS — F33.9 RECURRENT MAJOR DEPRESSIVE DISORDER, REMISSION STATUS UNSPECIFIED (CMS-HCC): ICD-10-CM

## 2023-11-30 DIAGNOSIS — E11.649 UNCONTROLLED TYPE 2 DIABETES MELLITUS WITH HYPOGLYCEMIA, UNSPECIFIED HYPOGLYCEMIA COMA STATUS (MULTI): ICD-10-CM

## 2023-11-30 DIAGNOSIS — J44.9 CHRONIC OBSTRUCTIVE PULMONARY DISEASE, UNSPECIFIED COPD TYPE (MULTI): ICD-10-CM

## 2023-11-30 DIAGNOSIS — Z00.00 MEDICARE ANNUAL WELLNESS VISIT, SUBSEQUENT: ICD-10-CM

## 2023-11-30 DIAGNOSIS — R41.89 COGNITIVE IMPAIRMENT: ICD-10-CM

## 2023-11-30 DIAGNOSIS — R42 VERTIGO: ICD-10-CM

## 2023-11-30 DIAGNOSIS — I65.22 STENOSIS OF LEFT CAROTID ARTERY: ICD-10-CM

## 2023-11-30 DIAGNOSIS — R35.0 FREQUENCY OF URINATION: Primary | ICD-10-CM

## 2023-11-30 DIAGNOSIS — R47.1 DYSARTHRIA AND ANARTHRIA: ICD-10-CM

## 2023-11-30 DIAGNOSIS — Z12.11 ENCOUNTER FOR SCREENING FOR MALIGNANT NEOPLASM OF COLON: ICD-10-CM

## 2023-11-30 DIAGNOSIS — K21.9 GASTROESOPHAGEAL REFLUX DISEASE WITHOUT ESOPHAGITIS: ICD-10-CM

## 2023-11-30 DIAGNOSIS — F32.A DEPRESSION, UNSPECIFIED DEPRESSION TYPE: ICD-10-CM

## 2023-11-30 DIAGNOSIS — E78.00 HYPERCHOLESTEREMIA: ICD-10-CM

## 2023-11-30 PROBLEM — E11.9 TYPE 2 DIABETES MELLITUS WITHOUT COMPLICATIONS (MULTI): Status: ACTIVE | Noted: 2021-04-20

## 2023-11-30 PROBLEM — K52.9 NONINFECTIVE GASTROENTERITIS AND COLITIS, UNSPECIFIED: Status: ACTIVE | Noted: 2021-04-20

## 2023-11-30 PROBLEM — J45.909 ASTHMA (HHS-HCC): Status: ACTIVE | Noted: 2023-11-30

## 2023-11-30 PROBLEM — I63.9 STROKE (MULTI): Status: ACTIVE | Noted: 2023-11-30

## 2023-11-30 PROBLEM — K59.00 CONSTIPATION, UNSPECIFIED: Status: ACTIVE | Noted: 2021-04-20

## 2023-11-30 PROBLEM — R09.89 CAROTID BRUIT: Status: ACTIVE | Noted: 2023-11-30

## 2023-11-30 LAB
POC APPEARANCE, URINE: CLEAR
POC BILIRUBIN, URINE: NEGATIVE
POC BLOOD, URINE: NEGATIVE
POC COLOR, URINE: ABNORMAL
POC GLUCOSE, URINE: NEGATIVE MG/DL
POC HEMOGLOBIN A1C: 8.5 % (ref 4.2–6.5)
POC KETONES, URINE: NEGATIVE MG/DL
POC LEUKOCYTES, URINE: ABNORMAL
POC NITRITE,URINE: NEGATIVE
POC PH, URINE: 5.5 PH
POC PROTEIN, URINE: NEGATIVE MG/DL
POC SPECIFIC GRAVITY, URINE: >=1.03
POC UROBILINOGEN, URINE: 0.2 EU/DL

## 2023-11-30 PROCEDURE — 4004F PT TOBACCO SCREEN RCVD TLK: CPT | Performed by: INTERNAL MEDICINE

## 2023-11-30 PROCEDURE — G0439 PPPS, SUBSEQ VISIT: HCPCS | Performed by: INTERNAL MEDICINE

## 2023-11-30 PROCEDURE — 3078F DIAST BP <80 MM HG: CPT | Performed by: INTERNAL MEDICINE

## 2023-11-30 PROCEDURE — 1125F AMNT PAIN NOTED PAIN PRSNT: CPT | Performed by: INTERNAL MEDICINE

## 2023-11-30 PROCEDURE — 1170F FXNL STATUS ASSESSED: CPT | Performed by: INTERNAL MEDICINE

## 2023-11-30 PROCEDURE — 3074F SYST BP LT 130 MM HG: CPT | Performed by: INTERNAL MEDICINE

## 2023-11-30 PROCEDURE — 1160F RVW MEDS BY RX/DR IN RCRD: CPT | Performed by: INTERNAL MEDICINE

## 2023-11-30 PROCEDURE — 83036 HEMOGLOBIN GLYCOSYLATED A1C: CPT | Performed by: INTERNAL MEDICINE

## 2023-11-30 PROCEDURE — 1159F MED LIST DOCD IN RCRD: CPT | Performed by: INTERNAL MEDICINE

## 2023-11-30 PROCEDURE — 3046F HEMOGLOBIN A1C LEVEL >9.0%: CPT | Performed by: INTERNAL MEDICINE

## 2023-11-30 PROCEDURE — 99213 OFFICE O/P EST LOW 20 MIN: CPT | Performed by: INTERNAL MEDICINE

## 2023-11-30 PROCEDURE — 81003 URINALYSIS AUTO W/O SCOPE: CPT | Performed by: INTERNAL MEDICINE

## 2023-11-30 ASSESSMENT — ENCOUNTER SYMPTOMS
DEPRESSION: 1
OCCASIONAL FEELINGS OF UNSTEADINESS: 1
LOSS OF SENSATION IN FEET: 1

## 2023-11-30 ASSESSMENT — ACTIVITIES OF DAILY LIVING (ADL)
MANAGING_FINANCES: TOTAL CARE
TAKING_MEDICATION: NEEDS ASSISTANCE
GROCERY_SHOPPING: TOTAL CARE
DRESSING: INDEPENDENT
BATHING: INDEPENDENT
DOING_HOUSEWORK: TOTAL CARE

## 2023-11-30 ASSESSMENT — PAIN SCALES - GENERAL: PAINLEVEL: 8

## 2023-11-30 NOTE — PROGRESS NOTES
"Patient ID: She states that she went to see vascular and she was told she is not a candidate for surgery because it is \"completely blocked\". She states she urinates a lot, 3-4 times per hour and she is incontinent. She states she does not get around much, especially since weather got bad. She states that she lives with her son. She states she is unable to talk or read since stroke. Her daughter worries that she can not read the numbers on her glucometer. She reports she has been eating ok. She states that she has trouble finding words, seems to be getting worse. She reports she would like to see speech therapy. She admits she never saw therapist as she has not made it to appointments, would like referral for psych/therapy.    HPI: Lenora Ames is a 68 y.o. female with PMH remarkable for DM type 2, CVA, who presents to the office today for Medicare Annual Wellness Visit Subsequent, Urinary Frequency (5-6 x hour/Urgency Incontinence), and Cerebrovascular Accident (Since Stroke has had trouble reading, writing, speaking, forgetting words).    HEALTH MAINTENANCE: Annual Wellness Physical  Smoking: current every day smoker  Mammogram (40-75): DUE  Pap smear (21-65): n/a age  Labs: 11/24/23    SOCIAL HISTORY:  Social History     Tobacco Use    Smoking status: Every Day     Packs/day: .5     Types: Cigarettes    Smokeless tobacco: Never   Vaping Use    Vaping Use: Never used   Substance Use Topics    Alcohol use: Yes     Comment: occasional    Drug use: Yes     Types: Marijuana     Comment: daily use     IMMUNIZATIONS:  Immunization History   Administered Date(s) Administered    Moderna SARS-CoV-2 Vaccination 04/27/2021, 05/25/2021    PPD Test 04/21/2021, 04/28/2021    Pneumococcal polysaccharide vaccine, 23-valent, age 2 years and older (PNEUMOVAX 23) 08/29/2019, 03/07/2023     REVIEW OF SYSTEMS:  Review of Systems  12 point review of systems negative unless stated above in HPI    ALLERGIES:  Allergies   Allergen " Reactions    Rofecoxib Unknown      VITAL SIGNS:  Vitals:    11/30/23 1006   BP: 94/57   Pulse: 86   Resp: 20   SpO2: 92%     PHYSICAL EXAM:  Physical Exam  Vitals reviewed.   Constitutional:       Appearance: Normal appearance.   HENT:      Head: Normocephalic and atraumatic.   Cardiovascular:      Rate and Rhythm: Normal rate and regular rhythm.      Pulses: Normal pulses.      Heart sounds: Normal heart sounds.   Pulmonary:      Effort: Pulmonary effort is normal.      Breath sounds: Normal breath sounds.   Abdominal:      General: Bowel sounds are normal.      Palpations: Abdomen is soft.   Skin:     General: Skin is warm and dry.   Neurological:      General: No focal deficit present.      Mental Status: She is alert and oriented to person, place, and time.      Cranial Nerves: Dysarthria present.      Motor: Weakness present.      Gait: Gait abnormal.      Comments: Difficulty finding words, she is able to describe an object, but can not recall name of object; patient and her daughter report she often has difficulty finding words   Psychiatric:         Mood and Affect: Mood normal.         Speech: Speech is delayed.         Behavior: Behavior normal.         Cognition and Memory: Memory is impaired.       MEDICATIONS:  Current Outpatient Medications on File Prior to Visit   Medication Sig Dispense Refill    aspirin 81 mg chewable tablet Chew 1 tablet (81 mg) once daily.      atorvastatin (Lipitor) 20 mg tablet TAKE ONE TABLET BY MOUTH AT BEDTIME 30 tablet 0    cyclobenzaprine (Flexeril) 10 mg tablet TAKE ONE TABLET BY MOUTH THREE TIMES A DAY 90 tablet 0    escitalopram (Lexapro) 10 mg tablet TAKE ONE TABLET BY MOUTH EVERY DAY 30 tablet 0    HumaLOG KwikPen Insulin 100 unit/mL injection USE AS DIRECTED PER SLIDING SCALE UP TO 24 UNITS TOTAL PER DAY (8 BEFORE EACH MEAL). 15 mL 0    insulin glargine (Lantus Solostar U-100 Insulin) 100 unit/mL (3 mL) pen INJECT 30 UNITS SUBCUTANEOUSLY AT BEDTIME. 9 mL 0     "meclizine (Antivert) 25 mg tablet TAKE ONE TABLET BY MOUTH EVERY 8 HOURS AS NEEDED 90 tablet 0    melatonin 5 mg tablet Take 1 tablet (5 mg) by mouth once daily as needed for sleep.      metFORMIN  mg 24 hr tablet TAKE TWO TABLETS BY MOUTH TWO TIMES A  tablet 0    midodrine (Proamatine) 5 mg tablet TAKE ONE TABLET BY MOUTH THREE TIMES A DAY 90 tablet 0    pantoprazole (ProtoNix) 40 mg EC tablet Take 1 tablet (40 mg) by mouth once daily in the morning. Take before meals. Do not crush, chew, or split. 90 tablet 0    pen needle 1/2\" (Comfort EZ Pen Needles) 29G X 12mm needle Inject 3 each under the skin once daily. Use as instructed 300 each 0    sucralfate (Carafate) 1 gram tablet TAKE ONE TABLET BY MOUTH FOUR TIMES A DAY (Patient taking differently: Take 1 tablet (1 g) by mouth 2 times a day.) 120 tablet 0    Ventolin HFA 90 mcg/actuation inhaler INHALE 2 PUFFS BY MOUTH AS INSTRUCTED EVERY 4 HOURS AS NEEDED.       No current facility-administered medications on file prior to visit.        LABORATORY DATA:  Lab Results   Component Value Date    WBC 11.6 (H) 11/24/2023    HGB 13.5 11/24/2023    HCT 40.9 11/24/2023     11/24/2023    CHOL 118 03/06/2023    TRIG 301 (H) 03/06/2023    HDL 34.0 (A) 03/06/2023    ALT 17 11/24/2023    AST 17 11/24/2023     11/24/2023    K 4.0 11/24/2023    CL 99 11/24/2023    CREATININE 0.79 11/24/2023    BUN 16 11/24/2023    CO2 27 11/24/2023    INR 1.0 03/05/2023    HGBA1C 7.3 (A) 08/15/2023     ASSESSMENT AND PLAN:  Assessment/Plan   Medicare Wellness exam  - she was recently seen in ER on 11/24/23 for N/V/D, states she was told it was most likely viral  - she was negative for flu at that time, refused to be tested for COVID  - CT angio abd/pelvis reviewed, no acute findings  - she left AMA after workup in ER  - she reports she has urinary frequency and incontinence, UA only with trace leukocytes   - she is due for mammogram, order inputted    Stenosis of left " "carotid artery  - her daughter reports she was seen by Dr. Silvestre and was told that she is not a candidate for surgery as her artery was \"completely blocked\"  - reviewed vascular note from 08/17/23 with following noted: \"Will send disc to [facility] to evaluate for possible TCAR. Did discuss the possibility of CEA. Recommend a neuro referral\"  - patient's daughter reports she was not able to schedule apt with neurology until March  - continue with ASA and statin    Uncontrolled type 2 diabetes mellitus with hypoglycemia, unspecified hypoglycemia coma status (CMS/HCC)  - hbA1c is worse today, 8.5, was 7.3 in August  - advised patient to cut back on sweets  - continue with  Humalog and Lantus insulin  - continue with Metformin  - continue blood sugar monitoring    Anxiety and depression  - she was previously admitted to [facility] in April 2023  - she was referred to psych at last apt, but patient's daughter reports she never went to apt  - advised patient and her daughter to see psych asap, referral placed    History of CVA (cerebrovascular accident) with dysarthria  - patient's daughter states she still has difficulty with comprehension, does not understand what she reads and difficulty finding and identifying words  - requesting referral for speech therapy, order inputted  - c/w ASA and statin  - she had complaint of headache at previous OV, obtained ESR and CRP at that time to rule out giant cell arteritis, CRP was normal, ESR was only slightly elevated    Gastroesophageal reflux disease without esophagitis  - stable  - c/w carafate and PPI    Postural dizziness  - c/w meclizine PRN    COPD  - stable  - continue with inhalers  --------------------  Written by Bebe Gonzalez, acting as a scribe for Dr. Harris. This note accurately reflects the work and decisions made by Dr. Harris.     I, Dr. Harris, attest all medical record entries made by the scribe were under my direction and were personally dictated by me. I have reviewed the " chart and agree that the record accurately reflects my performance of the history, physical exam, and assessment and plan.

## 2023-12-05 ENCOUNTER — HOSPITAL ENCOUNTER (OUTPATIENT)
Dept: CARDIOLOGY | Facility: HOSPITAL | Age: 68
Discharge: HOME | End: 2023-12-05
Payer: MEDICARE

## 2023-12-05 PROCEDURE — 93005 ELECTROCARDIOGRAM TRACING: CPT

## 2023-12-06 LAB
ATRIAL RATE: 106 BPM
P AXIS: 65 DEGREES
P OFFSET: 198 MS
P ONSET: 145 MS
PR INTERVAL: 142 MS
Q ONSET: 216 MS
QRS COUNT: 17 BEATS
QRS DURATION: 74 MS
QT INTERVAL: 346 MS
QTC CALCULATION(BAZETT): 459 MS
QTC FREDERICIA: 418 MS
R AXIS: -57 DEGREES
T AXIS: 59 DEGREES
T OFFSET: 389 MS
VENTRICULAR RATE: 106 BPM

## 2023-12-07 ENCOUNTER — DOCUMENTATION (OUTPATIENT)
Dept: SPEECH THERAPY | Facility: HOSPITAL | Age: 68
End: 2023-12-07
Payer: MEDICAID

## 2023-12-07 NOTE — PROGRESS NOTES
Speech-Language Pathology                 Therapy Communication Note    Patient Name: Lenora Ames  MRN: [id number]  Today's Date: 12/7/2023     Discipline: Speech Language Pathology    Missed Visit Reason:  Pt no show this date. Of note, staff working yesterday reporting to this SLP that pt showed up to office yesterday 12/6/23 at approximately 5:35pm for today's appointment. SLP on staff provided pt with written reminder for today's appointment and called family to pick her up.     Missed Time: No Show

## 2023-12-15 DIAGNOSIS — E11.649 UNCONTROLLED TYPE 2 DIABETES MELLITUS WITH HYPOGLYCEMIA WITHOUT COMA (MULTI): ICD-10-CM

## 2023-12-18 DIAGNOSIS — F33.9 EPISODE OF RECURRENT MAJOR DEPRESSIVE DISORDER, UNSPECIFIED DEPRESSION EPISODE SEVERITY (CMS-HCC): ICD-10-CM

## 2023-12-18 DIAGNOSIS — K21.9 GASTROESOPHAGEAL REFLUX DISEASE WITHOUT ESOPHAGITIS: ICD-10-CM

## 2023-12-18 DIAGNOSIS — E11.649 UNCONTROLLED TYPE 2 DIABETES MELLITUS WITH HYPOGLYCEMIA WITHOUT COMA (MULTI): ICD-10-CM

## 2023-12-18 DIAGNOSIS — E78.00 HYPERCHOLESTEREMIA: ICD-10-CM

## 2023-12-18 RX ORDER — ESCITALOPRAM OXALATE 10 MG/1
10 TABLET ORAL DAILY
Qty: 30 TABLET | Refills: 0 | Status: SHIPPED | OUTPATIENT
Start: 2023-12-18 | End: 2024-01-26

## 2023-12-18 RX ORDER — ATORVASTATIN CALCIUM 20 MG/1
20 TABLET, FILM COATED ORAL NIGHTLY
Qty: 30 TABLET | Refills: 0 | Status: SHIPPED | OUTPATIENT
Start: 2023-12-18 | End: 2024-02-12

## 2023-12-18 RX ORDER — PANTOPRAZOLE SODIUM 40 MG/1
TABLET, DELAYED RELEASE ORAL
Qty: 90 TABLET | Refills: 0 | Status: SHIPPED | OUTPATIENT
Start: 2023-12-18 | End: 2024-01-26

## 2023-12-18 RX ORDER — METFORMIN HYDROCHLORIDE 500 MG/1
1000 TABLET, EXTENDED RELEASE ORAL 2 TIMES DAILY
Qty: 120 TABLET | Refills: 0 | Status: SHIPPED | OUTPATIENT
Start: 2023-12-18 | End: 2024-02-12

## 2023-12-18 RX ORDER — INSULIN GLARGINE 100 [IU]/ML
30 INJECTION, SOLUTION SUBCUTANEOUS NIGHTLY
Qty: 9 ML | Refills: 0 | Status: SHIPPED | OUTPATIENT
Start: 2023-12-18 | End: 2024-01-26

## 2023-12-19 ENCOUNTER — APPOINTMENT (OUTPATIENT)
Dept: SPEECH THERAPY | Facility: HOSPITAL | Age: 68
End: 2023-12-19
Payer: MEDICARE

## 2023-12-19 ENCOUNTER — DOCUMENTATION (OUTPATIENT)
Dept: SPEECH THERAPY | Facility: HOSPITAL | Age: 68
End: 2023-12-19

## 2023-12-19 NOTE — PROGRESS NOTES
Speech-Language Pathology                 Therapy Communication Note    Patient Name: Lenora Ames  MRN: [id number]  Today's Date: 12/19/2023     Discipline: Speech Language Pathology    Missed Visit Reason:  Pt cancelling today's evaluation via voicemail this date.     Missed Time: Cancel

## 2024-01-09 ENCOUNTER — APPOINTMENT (OUTPATIENT)
Dept: PRIMARY CARE | Facility: CLINIC | Age: 69
End: 2024-01-09
Payer: MEDICARE

## 2024-01-15 ENCOUNTER — EVALUATION (OUTPATIENT)
Dept: SPEECH THERAPY | Facility: HOSPITAL | Age: 69
End: 2024-01-15
Payer: MEDICARE

## 2024-01-15 DIAGNOSIS — R41.89 COGNITIVE IMPAIRMENT: ICD-10-CM

## 2024-01-15 DIAGNOSIS — R47.01 APHASIA: Primary | ICD-10-CM

## 2024-01-15 PROCEDURE — 92523 SPEECH SOUND LANG COMPREHEN: CPT | Mod: GN | Performed by: SPEECH-LANGUAGE PATHOLOGIST

## 2024-01-15 PROCEDURE — 92507 TX SP LANG VOICE COMM INDIV: CPT | Mod: GN | Performed by: SPEECH-LANGUAGE PATHOLOGIST

## 2024-01-15 ASSESSMENT — PAIN - FUNCTIONAL ASSESSMENT: PAIN_FUNCTIONAL_ASSESSMENT: 0-10

## 2024-01-15 ASSESSMENT — PAIN SCALES - GENERAL: PAINLEVEL_OUTOF10: 0 - NO PAIN

## 2024-01-15 NOTE — PROGRESS NOTES
Speech-Language Pathology    SLP ADULT Inpatient Speech-Language Cognition      Patient Name: Lenora Ames  MRN: [id number]  Today's Date: 2024      Time Calculation  Start Time: 915  Stop Time: 1030  Time Calculation (min): 75 min      Current Problem:   1. Aphasia  Follow Up In Speech Therapy      2. Cognitive impairment  Referral to Speech Therapy        Subjective   Current Problem:  Pt reports difficulty with expressive language with a loss of ability to read and write since CVA.     Pain:  No pain reported impacting therapy session.      General Visit Information:  General Information  Past Medical History Relevant to Rehab: CVA - 2022 - Pt reports she has not received speech in the past.    SLP Assessment:  The pt's speech, language and cognitive function were formally assessed using the [place] Aphasia Screening Test (MAST) and the  [place] Cognitive Assessment (MOCA-B). Results are as follows:  [place] Aphasia Screening Test (MAST): 76/100    Expressive Index: 36/50  -Namin/10  -Automatic Speech: 10/10  -Repetition: 10/10  -Writin10  -Verbal Fluency: 10/10     Receptive Index: 40/50  -Yes/No Accuracy: 20/20  -Object Recognition: 10/10  -Following Instructions: 10/10  -Reading Instructions: 0/10     Areas of weakness included impaired word finding at the naming and self formed word level, an inability to functionally spell resulting in an inability to communicate successfully in written form and in ability to read and comprehend written information beyond word level.     The [place] Cognitive Assessment (MOCA-B) was also administered to further assess the patient's speech, language and cognitive abilities. Scores of 26+ are considered within typical limits. The pt. scored 6/30. Notable difficulty was demonstrated in the following areas: executive function, immediate and delayed recall, verbal fluency/word formulation, orientation, completing calculations, abstraction,  visuoperception, naming and attention.     Breakdown of scoring is as follows:  -Executive function: 0/1  -Immediate recall: 3/5  -Verbal Fluency/Word findin/2  -Orientation: 2/6   -Calculations: 0/3  -Abstraction: 0/3  -Delayed Recall: 1/5  -Visuo-perception: 2/3  -Namin/4  -Attention: 0/3    Pt presents with aphasia upon completion of the assessment with weakness in the areas of expressive/receptive language and cognition. Pt will benefit from skilled ST services in order to address identified deficits. Without skilled ST services, pt is at risk for continued and more severe cognitive-linguistic deficits, decreased independence and increased caregiver burden.     Objective   Therapy initiated which included education re: results and recommendations and established goals. SLP provided the patient with initial therapy tasks to address weak areas. Homework was formulated, homework folder given with recommendation for daily practice of homework materials. The pt reported understanding and agreement with all information.     Treatment Provided: Yes - Treatment initiated. Education Provided: Yes    SLP Plan:  Plan  SLP TX Plan: Continue Plan of Care  SLP Frequency: 2x per week  Duration: 6 weeks  SLP Discharge Recommendations: Outpatient SLP  Discussed POC: Patient  Discussed Risks/Benefits: Patient  Patient/Caregiver Agreeable: Yes    Goals:  LT Goal: The pt will successfully communicate all wants, needs and ideas across all communication modes.    ST Goals:   Pt will demonstrate symbol - word level naming/word finding with 90% accuracy.  Pt will demonstrate reading comprehension at word through sentence level with 90% accuracy.  Pt will demonstrate written communication at symbol through phrase level with 90% accuracy.     Outpatient Education:  Adult Outpatient Education  Written Education : Homework  Verbal Education : Results and recommendations  Risk and Benefits Discussed with Patient: yes  Patient  Demonstrated Understanding: yes  Plan of Care Discussed and Agreed Upon: yes      Consultations/Referrals/Coordination of Services: Consider occupational therapy to address weakness in right hand.

## 2024-01-16 ENCOUNTER — TELEPHONE (OUTPATIENT)
Dept: SPEECH THERAPY | Facility: HOSPITAL | Age: 69
End: 2024-01-16
Payer: MEDICARE

## 2024-01-26 ENCOUNTER — TREATMENT (OUTPATIENT)
Dept: SPEECH THERAPY | Facility: HOSPITAL | Age: 69
End: 2024-01-26
Payer: MEDICARE

## 2024-01-26 DIAGNOSIS — F33.9 EPISODE OF RECURRENT MAJOR DEPRESSIVE DISORDER, UNSPECIFIED DEPRESSION EPISODE SEVERITY (CMS-HCC): ICD-10-CM

## 2024-01-26 DIAGNOSIS — J45.20 INTERMITTENT ASTHMA, UNSPECIFIED ASTHMA SEVERITY, UNSPECIFIED WHETHER COMPLICATED (HHS-HCC): Primary | ICD-10-CM

## 2024-01-26 DIAGNOSIS — R47.01 APHASIA: ICD-10-CM

## 2024-01-26 DIAGNOSIS — K21.9 GASTROESOPHAGEAL REFLUX DISEASE WITHOUT ESOPHAGITIS: ICD-10-CM

## 2024-01-26 DIAGNOSIS — E11.649 UNCONTROLLED TYPE 2 DIABETES MELLITUS WITH HYPOGLYCEMIA WITHOUT COMA (MULTI): ICD-10-CM

## 2024-01-26 PROCEDURE — 92507 TX SP LANG VOICE COMM INDIV: CPT | Mod: GN | Performed by: SPEECH-LANGUAGE PATHOLOGIST

## 2024-01-26 RX ORDER — ALBUTEROL SULFATE 90 UG/1
AEROSOL, METERED RESPIRATORY (INHALATION)
Qty: 18 G | Refills: 0 | Status: SHIPPED | OUTPATIENT
Start: 2024-01-26 | End: 2024-06-03

## 2024-01-26 RX ORDER — ESCITALOPRAM OXALATE 10 MG/1
10 TABLET ORAL DAILY
Qty: 30 TABLET | Refills: 0 | Status: SHIPPED | OUTPATIENT
Start: 2024-01-26 | End: 2024-02-26 | Stop reason: SDUPTHER

## 2024-01-26 RX ORDER — INSULIN GLARGINE 100 [IU]/ML
30 INJECTION, SOLUTION SUBCUTANEOUS NIGHTLY
Qty: 9 ML | Refills: 0 | Status: SHIPPED | OUTPATIENT
Start: 2024-01-26 | End: 2024-02-12

## 2024-01-26 RX ORDER — PANTOPRAZOLE SODIUM 40 MG/1
TABLET, DELAYED RELEASE ORAL
Qty: 90 TABLET | Refills: 0 | Status: SHIPPED | OUTPATIENT
Start: 2024-01-26

## 2024-01-26 NOTE — PROGRESS NOTES
Speech-Language Pathology    Outpatient Speech-Language Pathology Treatment     Patient Name: Lenora Ames  MRN: [id number]  Today's Date: 1/26/2024     Time Calculation  Start Time: 1030  Stop Time: 1115  Time Calculation (min): 45 min    Current Problem:     Therapy Diagnosis  Assessed    · Aphasia (R47.01)     Plan of Care:  Continue Current Plan of Care      Insurance   Insurance reviewed   Visit number: IE +1  Approved number of visits: MN  Authorization date range:       Subjective     Patient seen with daughter present and active in session.  Behavior: Pt was motivated and put good effort into therapy tasks.   No pain reported impacting therapy session.       Objective     Objective/Goal:     *1st follow up session.    Goals:  LT Goal: The pt will successfully communicate all wants, needs and ideas across all communication modes.     ST Goals:   Pt will demonstrate symbol - word level naming/word finding with 90% accuracy.    *verbal production of #'s out of order = 85% accuracy    *naming facial body parts = 50% independently, fill in the blank structure assist, e.g., We hear with our ____. Resulting in pt successfully naming body parts with 100% accuracy.    *production of read words - independently - 22% accuracy, pt able to reach levels of correct production with SLP providing max education re: sound awareness and assist with sounding out words    Pt will demonstrate reading comprehension at word through sentence level with 90% accuracy.    *letter I.d. = 85% accuracy    *# I.d. = 100% accuracy    *choosing word from choice of 5 = 100%      Pt will demonstrate written communication at symbol through phrase level with 90% accuracy.     *writing the alphabet - declined from IE, mod support and assist   *date  - 33% of components were correct  *writing numbers out of order - 100%  *Pt wrote name legibly with increased time and transfer of pencil between right and left hand.    Assessment:    Today was the  first f/u session, the pt demonstrated good potential for improvement. She was motivated and in complete agreement with homework given to work on between sessions.        Education     Individual Educated: patient and pt's daughter  Verbal Education Provided: Reviewed session and new homework tasks.       Patient Response to Education: patient/caregiver verbalized understanding of        information and patient performed return demonstration of exercises/activities.

## 2024-02-02 ENCOUNTER — TREATMENT (OUTPATIENT)
Dept: SPEECH THERAPY | Facility: HOSPITAL | Age: 69
End: 2024-02-02
Payer: MEDICARE

## 2024-02-02 DIAGNOSIS — R47.01 APHASIA: ICD-10-CM

## 2024-02-02 PROCEDURE — 92507 TX SP LANG VOICE COMM INDIV: CPT | Mod: GN | Performed by: SPEECH-LANGUAGE PATHOLOGIST

## 2024-02-02 NOTE — PROGRESS NOTES
Speech-Language Pathology    Outpatient Speech-Language Pathology Treatment     Patient Name: Lenora Ames  MRN: [id number]  Today's Date: 2/2/2024     Time Calculation  Start Time: 1045  Stop Time: 1130  Time Calculation (min): 45 min    Current Problem:     Therapy Diagnosis  Assessed    · Aphasia (R47.01)     Plan of Care:  Continue Current Plan of Care      Insurance   Insurance reviewed   Visit number: 3  Approved number of visits: MN  Authorization date range:       Subjective     Patient seen independently.  Behavior: Pt was compliant with homework and put good effort into therapy tasks.   No pain reported impacting therapy session.       Objective     Objective/Goal:     *1st follow up session.    Goals:  LT Goal: The pt will successfully communicate all wants, needs and ideas across all communication modes. (Goal 6 months from IE - 1/15/24)     ST Goals:   Pt will demonstrate symbol - word level naming/word finding with 90% accuracy.    *verbal production of #'s out of order = 60% accuracy, decreased from last session, increased variety of #'s presented this session    *naming facial body parts = 80% independently, significant improvement from previous session    *production of letter and sound for each letter = 64%    *naming pictures - word for each letter = 76% accuracy    *production of read words - independently - 50% accuracy, SLP provided max education re: sound awareness and assist with sounding out words with good response    Pt will demonstrate reading comprehension at word through sentence level with 90% accuracy.    *letter I.d. = 85% accuracy    *Pt able to recognize words, higher levels are a significant challenge    Pt will demonstrate written communication at symbol through phrase level with 90% accuracy.     *writing the alphabet - 85% accuracy   *personal information - name, phone #, address = 85% accuracy with mod increased formulation x, good homework compliance on these tasks with  carryover to improved skills this session    (Short - term goals - time frame 12 weeks from IE)    Assessment:    Good response to therapy tasks and techniques.    Education     Individual Educated: patient  Verbal Education Provided: Reviewed session and new homework tasks.   Patient Response to Education: patient verbalized understanding of    information and patient performed return demonstration of exercises/activities.

## 2024-02-09 ENCOUNTER — TREATMENT (OUTPATIENT)
Dept: SPEECH THERAPY | Facility: HOSPITAL | Age: 69
End: 2024-02-09
Payer: MEDICARE

## 2024-02-09 DIAGNOSIS — R47.01 APHASIA: ICD-10-CM

## 2024-02-09 PROCEDURE — 92507 TX SP LANG VOICE COMM INDIV: CPT | Mod: GN | Performed by: SPEECH-LANGUAGE PATHOLOGIST

## 2024-02-09 NOTE — PROGRESS NOTES
Speech-Language Pathology    Outpatient Speech-Language Pathology Treatment     Patient Name: Lenora Ames  MRN: [id number]  Today's Date: 2/9/2024     Time Calculation  Start Time: 1030  Stop Time: 1115  Time Calculation (min): 45 min    Current Problem:     Therapy Diagnosis  Assessed    · Aphasia (R47.01)     Plan of Care:  Continue Current Plan of Care      Insurance   Insurance reviewed   Visit number: 4  Approved number of visits: MN    Subjective  The pt reported that she had a difficult morning and reported recent struggle with keeping her blood sugar stable. She put excellent effort into all therapy tasks.     No pain reported impacting therapy session.       Objective     Goals:  LT Goal: The pt will successfully communicate all wants, needs and ideas across all communication modes. (Goal 6 months from IE - 1/15/24)     ST Goals:   Pt will demonstrate symbol - word level naming/word finding with 90% accuracy.    *naming labeled colors - 80% accuracy, naming unlabeled colors - 33% accuracy    *naming noun pictures - 91% accuracy - mod increased processing time    *communication of date - 57% of components were accurate    Pt will demonstrate reading comprehension at word through sentence level with 90% accuracy.    *letter I.d. = informal focus and correction during reading tasks, noted frequent errors of /n/ for /d/, /b/ for /p/    *word level reading - 66% - mod increased processing time, cues and assist improved level to 80% accuracy    Pt will demonstrate written communication at symbol through phrase level with 90% accuracy.     *writing the alphabet - 96% accuracy, significant improvement from previous session   *personal information - name = legible and accurate with min-mod increased time  *small sample - writing word for letter - /B/ ball - WFL - good    (Short - term goals - time frame 12 weeks from IE)    Education     Individual Educated: patient  Verbal Education Provided: Reviewed session  and new homework tasks.   Patient Response to Education: patient verbalized understanding of    information and patient performed return demonstration of exercises/activities.

## 2024-02-12 ENCOUNTER — TREATMENT (OUTPATIENT)
Dept: SPEECH THERAPY | Facility: HOSPITAL | Age: 69
End: 2024-02-12
Payer: MEDICARE

## 2024-02-12 DIAGNOSIS — R47.01 APHASIA: ICD-10-CM

## 2024-02-12 DIAGNOSIS — E78.00 HYPERCHOLESTEREMIA: ICD-10-CM

## 2024-02-12 DIAGNOSIS — E11.649 UNCONTROLLED TYPE 2 DIABETES MELLITUS WITH HYPOGLYCEMIA WITHOUT COMA (MULTI): ICD-10-CM

## 2024-02-12 PROCEDURE — 92507 TX SP LANG VOICE COMM INDIV: CPT | Mod: GN | Performed by: SPEECH-LANGUAGE PATHOLOGIST

## 2024-02-12 RX ORDER — ATORVASTATIN CALCIUM 20 MG/1
20 TABLET, FILM COATED ORAL NIGHTLY
Qty: 30 TABLET | Refills: 0 | Status: SHIPPED | OUTPATIENT
Start: 2024-02-12

## 2024-02-12 RX ORDER — INSULIN GLARGINE 100 [IU]/ML
30 INJECTION, SOLUTION SUBCUTANEOUS NIGHTLY
Qty: 9 ML | Refills: 0 | Status: SHIPPED | OUTPATIENT
Start: 2024-02-12

## 2024-02-12 RX ORDER — METFORMIN HYDROCHLORIDE 500 MG/1
1000 TABLET, EXTENDED RELEASE ORAL 2 TIMES DAILY
Qty: 120 TABLET | Refills: 0 | Status: SHIPPED | OUTPATIENT
Start: 2024-02-12 | End: 2024-02-13

## 2024-02-12 NOTE — PROGRESS NOTES
Speech-Language Pathology    Outpatient Speech-Language Pathology Treatment     Patient Name: Lenora Ames  MRN: [id number]  Today's Date: 2/12/2024     Time Calculation  Start Time: 0945  Stop Time: 1030  Time Calculation (min): 45 min    Current Problem:     Therapy Diagnosis  Assessed    · Aphasia (R47.01)     Plan of Care:  Continue Current Plan of Care      Insurance   Insurance reviewed   Visit number: 5  Approved number of visits: MN    Subjective  Pt reported having a difficult time getting going in the a.m. SLP was able to switch some of the pt's appointments to afternoon times.     No pain reported impacting therapy session.       Objective     Goals:  LT Goal: The pt will successfully communicate all wants, needs and ideas across all communication modes. (Goal 6 months from IE - 1/15/24)     ST Goals:   Pt will demonstrate symbol - word level naming/word finding with 90% accuracy.    *naming labeled colors - 66% accuracy, naming unlabeled colors - 44% accuracy *decreased accuracy with labeled colors, improved accuracy naming unlabeled    *naming noun pictures - 80% accuracy - overall mod increased processing time    Pt will demonstrate reading comprehension at word through sentence level with 90% accuracy.    *letter I.d. =Noted inconsistent difficulty sounding words out due to recognition errors on /m/, /d/, /b/ for /p/    *naming letters = 76% accuracy    *word level reading - 85% - *choosing word from group of 4    *matching picture and word = 100%    *y/n sentence level = 50% accuracy    *production of read sentences - measured per word - 64% accuracy    Pt will demonstrate written communication at symbol through phrase level with 90% accuracy.     *writing the alphabet - 92% accuracy with improved processing time   *personal information - mod increased processing, able to make corrections with SLP models  *writing target word presented by SLP = 76% accuracy - excellent progress     (Short - term  goals - time frame 12 weeks from IE)    Education     Individual Educated: patient  Verbal Education Provided: Reviewed session and new homework tasks.   Patient Response to Education: patient verbalized understanding of    information and patient performed return demonstration of exercises/activities.

## 2024-02-13 DIAGNOSIS — E11.649 UNCONTROLLED TYPE 2 DIABETES MELLITUS WITH HYPOGLYCEMIA WITHOUT COMA (MULTI): ICD-10-CM

## 2024-02-13 RX ORDER — METFORMIN HYDROCHLORIDE 500 MG/1
1000 TABLET, EXTENDED RELEASE ORAL 2 TIMES DAILY
Qty: 120 TABLET | Refills: 0 | Status: SHIPPED | OUTPATIENT
Start: 2024-02-13

## 2024-02-19 ENCOUNTER — TREATMENT (OUTPATIENT)
Dept: SPEECH THERAPY | Facility: HOSPITAL | Age: 69
End: 2024-02-19
Payer: MEDICARE

## 2024-02-19 ENCOUNTER — APPOINTMENT (OUTPATIENT)
Dept: SPEECH THERAPY | Facility: HOSPITAL | Age: 69
End: 2024-02-19
Payer: MEDICARE

## 2024-02-19 DIAGNOSIS — R47.01 APHASIA: ICD-10-CM

## 2024-02-19 PROCEDURE — 92507 TX SP LANG VOICE COMM INDIV: CPT | Mod: GN | Performed by: SPEECH-LANGUAGE PATHOLOGIST

## 2024-02-19 NOTE — PROGRESS NOTES
Speech-Language Pathology    Outpatient Speech-Language Pathology Treatment     Patient Name: Lenora Ames  MRN: [id number]  Today's Date: 2/19/2024     Time Calculation  Start Time: 1500  Stop Time: 1545  Time Calculation (min): 45 min    Current Problem:     Therapy Diagnosis  Assessed    · Aphasia (R47.01)     Plan of Care:  Continue Current Plan of Care      Insurance   Insurance reviewed   Visit number: 6  Approved number of visits: MN    Subjective  Pt reported she missed Friday's appointment due to issues with dizziness. She worked hard in the session and had all of her homework completed.     No pain reported impacting therapy session.       Objective     Goals:  LT Goal: The pt will successfully communicate all wants, needs and ideas across all communication modes. (Goal 6 months from IE - 1/15/24)     ST Goals:   Pt will demonstrate symbol - word level naming/word finding with 90% accuracy.    *naming labeled colors - 83%, good improvement from 66% accuracy at the last session    *naming labeled pictures - 57% accuracy - noted pt relied heavily on reading the label v.s looking at the picture, the pt often named letters incorrectly leading to incorrect attempts at sounding out words    Pt will demonstrate reading comprehension at word through sentence level with 90% accuracy.    *letter I.d. =80% accuracy on the 1st attempt    *naming letters = 70% accuracy    *naming #'s out of order = 80% accuracy    *word level reading with no picture support - 45% independent accuracy, high level of successful correction with SLP provided cues    Pt will demonstrate written communication at symbol through phrase level with 90% accuracy.     *writing the alphabet - 88% average 3 errors per trial  *personal information - 76% accuracy  *writing target word presented by SLP = 84% accuracy - continued progress   *writing #'s out of order = 100% - excellent    (Short - term goals - time frame 12 weeks from  IE)    Assessment   Pt is making good progress=, excellent response to therapy techniques and tasks.     Education     Individual Educated: patient  Verbal Education Provided: Reviewed session and new homework tasks.   Patient Response to Education: patient verbalized understanding of    information and patient performed return demonstration of exercises/activities.

## 2024-02-21 DIAGNOSIS — E11.9 TYPE 2 DIABETES MELLITUS WITHOUT COMPLICATION, UNSPECIFIED WHETHER LONG TERM INSULIN USE (MULTI): Primary | ICD-10-CM

## 2024-02-22 RX ORDER — CALCIUM CITRATE/VITAMIN D3 200MG-6.25
TABLET ORAL
Qty: 400 EACH | Refills: 0 | Status: SHIPPED | OUTPATIENT
Start: 2024-02-22 | End: 2024-02-23 | Stop reason: SDUPTHER

## 2024-02-23 DIAGNOSIS — E11.9 TYPE 2 DIABETES MELLITUS WITHOUT COMPLICATION, UNSPECIFIED WHETHER LONG TERM INSULIN USE (MULTI): ICD-10-CM

## 2024-02-23 RX ORDER — CALCIUM CITRATE/VITAMIN D3 200MG-6.25
TABLET ORAL
Qty: 400 EACH | Refills: 0 | Status: SHIPPED | OUTPATIENT
Start: 2024-02-23

## 2024-02-26 DIAGNOSIS — F33.9 EPISODE OF RECURRENT MAJOR DEPRESSIVE DISORDER, UNSPECIFIED DEPRESSION EPISODE SEVERITY (CMS-HCC): ICD-10-CM

## 2024-02-26 RX ORDER — ESCITALOPRAM OXALATE 10 MG/1
10 TABLET ORAL DAILY
Qty: 30 TABLET | Refills: 0 | Status: SHIPPED | OUTPATIENT
Start: 2024-02-26 | End: 2024-02-27 | Stop reason: SDUPTHER

## 2024-02-27 DIAGNOSIS — F33.9 EPISODE OF RECURRENT MAJOR DEPRESSIVE DISORDER, UNSPECIFIED DEPRESSION EPISODE SEVERITY (CMS-HCC): ICD-10-CM

## 2024-02-27 RX ORDER — ESCITALOPRAM OXALATE 10 MG/1
10 TABLET ORAL DAILY
Qty: 30 TABLET | Refills: 0 | Status: SHIPPED | OUTPATIENT
Start: 2024-02-27 | End: 2024-03-20 | Stop reason: SDUPTHER

## 2024-03-01 ENCOUNTER — APPOINTMENT (OUTPATIENT)
Dept: SPEECH THERAPY | Facility: HOSPITAL | Age: 69
End: 2024-03-01
Payer: MEDICARE

## 2024-03-04 ENCOUNTER — APPOINTMENT (OUTPATIENT)
Dept: SPEECH THERAPY | Facility: HOSPITAL | Age: 69
End: 2024-03-04
Payer: MEDICARE

## 2024-03-08 ENCOUNTER — APPOINTMENT (OUTPATIENT)
Dept: SPEECH THERAPY | Facility: HOSPITAL | Age: 69
End: 2024-03-08
Payer: MEDICARE

## 2024-03-18 ENCOUNTER — APPOINTMENT (OUTPATIENT)
Dept: VASCULAR MEDICINE | Facility: HOSPITAL | Age: 69
End: 2024-03-18
Payer: MEDICARE

## 2024-03-20 ENCOUNTER — OFFICE VISIT (OUTPATIENT)
Dept: PRIMARY CARE | Facility: CLINIC | Age: 69
End: 2024-03-20
Payer: MEDICARE

## 2024-03-20 VITALS
HEART RATE: 68 BPM | OXYGEN SATURATION: 93 % | WEIGHT: 169 LBS | RESPIRATION RATE: 20 BRPM | DIASTOLIC BLOOD PRESSURE: 72 MMHG | BODY MASS INDEX: 33.18 KG/M2 | HEIGHT: 60 IN | SYSTOLIC BLOOD PRESSURE: 119 MMHG

## 2024-03-20 DIAGNOSIS — L03.90 CELLULITIS, UNSPECIFIED CELLULITIS SITE: ICD-10-CM

## 2024-03-20 DIAGNOSIS — E11.649 UNCONTROLLED TYPE 2 DIABETES MELLITUS WITH HYPOGLYCEMIA, UNSPECIFIED HYPOGLYCEMIA COMA STATUS (MULTI): ICD-10-CM

## 2024-03-20 DIAGNOSIS — M75.101 ROTATOR CUFF SYNDROME OF RIGHT SHOULDER: ICD-10-CM

## 2024-03-20 DIAGNOSIS — B02.8 HERPES ZOSTER WITH COMPLICATION: ICD-10-CM

## 2024-03-20 DIAGNOSIS — M79.2 NEUROPATHIC PAIN: ICD-10-CM

## 2024-03-20 DIAGNOSIS — R35.0 URINARY FREQUENCY: ICD-10-CM

## 2024-03-20 DIAGNOSIS — F33.9 EPISODE OF RECURRENT MAJOR DEPRESSIVE DISORDER, UNSPECIFIED DEPRESSION EPISODE SEVERITY (CMS-HCC): ICD-10-CM

## 2024-03-20 LAB — POC HEMOGLOBIN A1C: 7.7 % (ref 4.2–6.5)

## 2024-03-20 PROCEDURE — 1159F MED LIST DOCD IN RCRD: CPT | Performed by: INTERNAL MEDICINE

## 2024-03-20 PROCEDURE — 3074F SYST BP LT 130 MM HG: CPT | Performed by: INTERNAL MEDICINE

## 2024-03-20 PROCEDURE — 83036 HEMOGLOBIN GLYCOSYLATED A1C: CPT | Performed by: INTERNAL MEDICINE

## 2024-03-20 PROCEDURE — 1160F RVW MEDS BY RX/DR IN RCRD: CPT | Performed by: INTERNAL MEDICINE

## 2024-03-20 PROCEDURE — 3078F DIAST BP <80 MM HG: CPT | Performed by: INTERNAL MEDICINE

## 2024-03-20 PROCEDURE — 99214 OFFICE O/P EST MOD 30 MIN: CPT | Performed by: INTERNAL MEDICINE

## 2024-03-20 RX ORDER — CYCLOBENZAPRINE HCL 10 MG
10 TABLET ORAL 3 TIMES DAILY
Qty: 90 TABLET | Refills: 0 | Status: SHIPPED | OUTPATIENT
Start: 2024-03-20

## 2024-03-20 RX ORDER — GABAPENTIN 100 MG/1
200 CAPSULE ORAL 2 TIMES DAILY
Qty: 12 CAPSULE | Refills: 0 | Status: SHIPPED | OUTPATIENT
Start: 2024-03-24 | End: 2024-03-27

## 2024-03-20 RX ORDER — GABAPENTIN 100 MG/1
300 CAPSULE ORAL 2 TIMES DAILY
Qty: 180 CAPSULE | Refills: 0 | Status: SHIPPED | OUTPATIENT
Start: 2024-03-27 | End: 2024-04-26

## 2024-03-20 RX ORDER — ESCITALOPRAM OXALATE 10 MG/1
10 TABLET ORAL DAILY
Qty: 30 TABLET | Refills: 0 | Status: SHIPPED | OUTPATIENT
Start: 2024-03-20 | End: 2024-06-04

## 2024-03-20 RX ORDER — DOXYCYCLINE 100 MG/1
100 CAPSULE ORAL 2 TIMES DAILY
Qty: 20 CAPSULE | Refills: 0 | Status: SHIPPED | OUTPATIENT
Start: 2024-03-20 | End: 2024-03-30

## 2024-03-20 RX ORDER — GABAPENTIN 100 MG/1
100 CAPSULE ORAL 2 TIMES DAILY
Qty: 6 CAPSULE | Refills: 0 | Status: SHIPPED | OUTPATIENT
Start: 2024-03-21 | End: 2024-03-24

## 2024-03-20 RX ORDER — TOLTERODINE 4 MG/1
4 CAPSULE, EXTENDED RELEASE ORAL DAILY
Qty: 90 CAPSULE | Refills: 1 | Status: SHIPPED | OUTPATIENT
Start: 2024-03-20 | End: 2024-09-16

## 2024-03-20 NOTE — PROGRESS NOTES
"Patient ID: She states that she developed sore blisters on the back of her shoulders about one month ago and they have continued to worsen. She states they itch and burn and are very painful. She also would like a referral to a psychiatrist for depression. She states she continues to be unable to read her pill bottles, her daughter helps set up her pills every week.     Last Office Visit:     [facility] Lenora Ames is a 68 y.o. female with PMH remarkable for DM type 2, CVA, who presents to the office today for Blister (Painful Blisters/open sores shoulders and back x3 weeks. Feels like \"bugs are crawling\" /Unable to sleep, very painful), Medication Problem (Unsure which meds she is taking,unable to read/write since Stroke), and Depression (Tearful, would like possible psych referral).    Social History     Tobacco Use    Smoking status: Every Day     Packs/day: .5     Types: Cigarettes    Smokeless tobacco: Never   Substance Use Topics    Alcohol use: Yes     Comment: occasional      Review of Systems    Visit Vitals  /72   Pulse 68   Resp 20   Ht 1.524 m (5')   Wt 76.7 kg (169 lb)   SpO2 93%   BMI 33.01 kg/m²   OB Status Postmenopausal   Smoking Status Every Day   BSA 1.8 m²       Allergies   Allergen Reactions    Rofecoxib Unknown      Physical Exam  Vitals reviewed.   Constitutional:       Appearance: Normal appearance.   HENT:      Head: Normocephalic and atraumatic.   Cardiovascular:      Rate and Rhythm: Normal rate and regular rhythm.      Pulses: Normal pulses.      Heart sounds: Normal heart sounds.   Pulmonary:      Effort: Pulmonary effort is normal.      Breath sounds: Normal breath sounds.   Abdominal:      General: Bowel sounds are normal.      Palpations: Abdomen is soft.   Skin:     Comments: There was a cluster of unroofed blisters to over bilateral shoulders; very painful, no active blisters noted   Neurological:      General: No focal deficit present.      Mental Status: She is alert and " "oriented to person, place, and time.   Psychiatric:         Mood and Affect: Mood normal.         Behavior: Behavior normal.       MEDICATIONS:  Current Outpatient Medications   Medication Instructions    aspirin 81 mg, oral, Daily    atorvastatin (LIPITOR) 20 mg, oral, Nightly    blood sugar diagnostic (True Metrix Glucose Test Strip) strip USE AS INSTRUCTED TO CHECK BLOOD SUGARS 4 TIMES DAILY.    cyclobenzaprine (FLEXERIL) 10 mg, oral, 3 times daily    escitalopram (LEXAPRO) 10 mg, oral, Daily    HumaLOG KwikPen Insulin 100 unit/mL injection USE AS DIRECTED PER SLIDING SCALE UP TO 24 UNITS TOTAL PER DAY (8 BEFORE EACH MEAL).    Lantus Solostar U-100 Insulin 30 Units, subcutaneous, Nightly    meclizine (Antivert) 25 mg tablet TAKE ONE TABLET BY MOUTH EVERY 8 HOURS AS NEEDED    melatonin 5 mg, oral, Daily PRN    metFORMIN XR (GLUCOPHAGE-XR) 1,000 mg, oral, 2 times daily    midodrine (PROAMATINE) 5 mg, oral, 3 times daily    pantoprazole (ProtoNix) 40 mg EC tablet TAKE ONE TABLET BY MOUTH once DAILY IN THE MORNING before a meal **do not crush, chew, or split**    pen needle 1/2\" (Comfort EZ Pen Needles) 29G X 12mm needle 3 each, subcutaneous, Daily, Use as instructed    sucralfate (CARAFATE) 1 g, oral, 2 times daily    Ventolin HFA 90 mcg/actuation inhaler INHALE 2 PUFFS BY MOUTH AS INSTRUCTED EVERY 4 HOURS AS NEEDED.        Lab Results   Component Value Date    WBC 11.6 (H) 11/24/2023    HGB 13.5 11/24/2023    HCT 40.9 11/24/2023     11/24/2023    CHOL 118 03/06/2023    TRIG 301 (H) 03/06/2023    HDL 34.0 (A) 03/06/2023    ALT 17 11/24/2023    AST 17 11/24/2023     11/24/2023    K 4.0 11/24/2023    CL 99 11/24/2023    CREATININE 0.79 11/24/2023    BUN 16 11/24/2023    CO2 27 11/24/2023    INR 1.0 03/05/2023    HGBA1C 8.5 (A) 11/30/2023       ASSESSMENT AND PLAN:  Assessment/Plan   Diagnoses and all orders for this visit:  Uncontrolled type 2 diabetes mellitus with hypoglycemia, unspecified hypoglycemia " coma status (CMS/[facility])  -     POCT glycosylated hemoglobin (Hb A1C) manually resulted    Neuropathic pain due to Shingles  -     gabapentin (Neurontin) 100 mg capsule; Take 1 capsule (100 mg) by mouth 2 times a day for 3 days. Do not start before March 21, 2024.  -     gabapentin (Neurontin) 100 mg capsule; Take 2 capsules (200 mg) by mouth 2 times a day for 3 days. Do not start before March 24, 2024.  -     gabapentin (Neurontin) 100 mg capsule; Take 3 capsules (300 mg) by mouth 2 times a day. Do not start before March 27, 2024.    Urinary frequency  -     tolterodine LA (Detrol LA) 4 mg 24 hr capsule; Take 1 capsule (4 mg) by mouth once daily. Do not crush, chew, or split.    Cellulitis, unspecified cellulitis site 2/2 blisters caused from Shingles  -     doxycycline (Vibramycin) 100 mg capsule; Take 1 capsule (100 mg) by mouth 2 times a day for 10 days. Take with at least 8 ounces (large glass) of water, do not lie down for 30 minutes after    Rotator cuff syndrome of right shoulder  -     cyclobenzaprine (Flexeril) 10 mg tablet; Take 1 tablet (10 mg) by mouth 3 times a day.    Episode of recurrent major depressive disorder, unspecified depression episode severity (CMS/[facility])  -     escitalopram (Lexapro) 10 mg tablet; Take 1 tablet (10 mg) by mouth once daily.  -     advised to follow up with psychiatry  ------  Written by Bebe Gonzalez LPN, acting as a scribe for Dr. Harris. This note accurately reflects the work and decisions made by Dr. Harris.     I, Dr. Harris, attest all medical record entries made by the scribe were under my direction and were personally dictated by me. I have reviewed the chart and agree that the record accurately reflects my performance of the history, physical exam, and assessment and plan.

## 2024-05-17 ENCOUNTER — DOCUMENTATION (OUTPATIENT)
Dept: SPEECH THERAPY | Facility: HOSPITAL | Age: 69
End: 2024-05-17
Payer: MEDICARE

## 2024-05-17 DIAGNOSIS — R47.01 APHASIA: Primary | ICD-10-CM

## 2024-05-17 NOTE — PROGRESS NOTES
Speech-Language Pathology  Discharge Summary    Name: Lenora Ames  MRN: [id number]  : 1955  Date: 24    Discharge Summary: SLP    Discharge Information: Date of discharge 2024, Date of last visit 2024, Date of evaluation 1/15/2024, Number of attended visits 6, Referred by Dr. Ortega, Referred for Aphasia s/p CVA    Therapy Summary:  Final goals and levels taken from the last attended visit (2024):    LT Goal: The pt will successfully communicate all wants, needs and ideas across all communication modes. (Goal 6 months from IE - 1/15/24)     ST Goals:   Pt will demonstrate symbol - word level naming/word finding with 90% accuracy.     *naming labeled colors - 83%, good improvement from 66% accuracy at the last session     *naming labeled pictures - 57% accuracy - noted pt relied heavily on reading the label v.s looking at the picture, the pt often named letters incorrectly leading to incorrect attempts at sounding out words     Pt will demonstrate reading comprehension at word through sentence level with 90% accuracy.     *letter I.d. =80% accuracy on the 1st attempt     *naming letters = 70% accuracy     *naming #'s out of order = 80% accuracy     *word level reading with no picture support - 45% independent accuracy, high level of successful correction with SLP provided cues     Pt will demonstrate written communication at symbol through phrase level with 90% accuracy.      *writing the alphabet - 88% average 3 errors per trial  *personal information - 76% accuracy  *writing target word presented by SLP = 84% accuracy - continued progress   *writing #'s out of order = 100% - excellent    Discharge Status:   Pt was making progress. She is a good candidate for therapy, but did not compliantly attend therapy.     Rehab Discharge Reason: Failed to schedule and/or keep follow-up appointment(s)

## 2024-05-31 DIAGNOSIS — J45.20 INTERMITTENT ASTHMA, UNSPECIFIED ASTHMA SEVERITY, UNSPECIFIED WHETHER COMPLICATED (HHS-HCC): ICD-10-CM

## 2024-06-03 RX ORDER — ALBUTEROL SULFATE 90 UG/1
AEROSOL, METERED RESPIRATORY (INHALATION)
Qty: 18 G | Refills: 0 | Status: SHIPPED | OUTPATIENT
Start: 2024-06-03

## 2024-06-04 DIAGNOSIS — F33.9 EPISODE OF RECURRENT MAJOR DEPRESSIVE DISORDER, UNSPECIFIED DEPRESSION EPISODE SEVERITY (CMS-HCC): ICD-10-CM

## 2024-06-04 RX ORDER — ESCITALOPRAM OXALATE 10 MG/1
10 TABLET ORAL DAILY
Qty: 30 TABLET | Refills: 0 | Status: SHIPPED | OUTPATIENT
Start: 2024-06-04

## 2024-06-28 ENCOUNTER — APPOINTMENT (OUTPATIENT)
Dept: PRIMARY CARE | Facility: CLINIC | Age: 69
End: 2024-06-28
Payer: MEDICARE

## 2024-06-28 VITALS
WEIGHT: 163 LBS | BODY MASS INDEX: 32 KG/M2 | SYSTOLIC BLOOD PRESSURE: 147 MMHG | DIASTOLIC BLOOD PRESSURE: 79 MMHG | HEIGHT: 60 IN | HEART RATE: 76 BPM | OXYGEN SATURATION: 91 % | RESPIRATION RATE: 20 BRPM

## 2024-06-28 DIAGNOSIS — I74.09 OTHER ARTERIAL EMBOLISM AND THROMBOSIS OF ABDOMINAL AORTA (MULTI): ICD-10-CM

## 2024-06-28 DIAGNOSIS — M79.2 NEUROPATHIC PAIN: ICD-10-CM

## 2024-06-28 DIAGNOSIS — L29.9 ITCHING: ICD-10-CM

## 2024-06-28 DIAGNOSIS — B02.29 POST HERPETIC NEURALGIA: ICD-10-CM

## 2024-06-28 DIAGNOSIS — E11.49 OTHER DIABETIC NEUROLOGICAL COMPLICATION ASSOCIATED WITH TYPE 2 DIABETES MELLITUS (MULTI): ICD-10-CM

## 2024-06-28 DIAGNOSIS — R35.0 URINARY FREQUENCY: ICD-10-CM

## 2024-06-28 DIAGNOSIS — I11.0 HYPERTENSIVE HEART DISEASE WITH HEART FAILURE (MULTI): ICD-10-CM

## 2024-06-28 DIAGNOSIS — E78.5 HYPERLIPIDEMIA, UNSPECIFIED HYPERLIPIDEMIA TYPE: ICD-10-CM

## 2024-06-28 DIAGNOSIS — J44.9 CHRONIC OBSTRUCTIVE PULMONARY DISEASE, UNSPECIFIED COPD TYPE (MULTI): ICD-10-CM

## 2024-06-28 DIAGNOSIS — I69.331: ICD-10-CM

## 2024-06-28 DIAGNOSIS — R33.9 INCOMPLETE EMPTYING OF BLADDER: ICD-10-CM

## 2024-06-28 DIAGNOSIS — J96.01 ACUTE RESPIRATORY FAILURE WITH HYPOXIA (MULTI): ICD-10-CM

## 2024-06-28 DIAGNOSIS — M79.675 PAIN OF TOE OF LEFT FOOT: Primary | ICD-10-CM

## 2024-06-28 DIAGNOSIS — I10 HYPERTENSION, UNSPECIFIED TYPE: ICD-10-CM

## 2024-06-28 DIAGNOSIS — E11.649 UNCONTROLLED TYPE 2 DIABETES MELLITUS WITH HYPOGLYCEMIA, UNSPECIFIED HYPOGLYCEMIA COMA STATUS (MULTI): ICD-10-CM

## 2024-06-28 LAB
POC APPEARANCE, URINE: CLEAR
POC BILIRUBIN, URINE: NEGATIVE
POC BLOOD, URINE: NEGATIVE
POC COLOR, URINE: YELLOW
POC GLUCOSE, URINE: NEGATIVE MG/DL
POC HEMOGLOBIN A1C: 7.7 % (ref 4.2–6.5)
POC KETONES, URINE: NEGATIVE MG/DL
POC LEUKOCYTES, URINE: NEGATIVE
POC NITRITE,URINE: NEGATIVE
POC PH, URINE: 6.5 PH
POC PROTEIN, URINE: NEGATIVE MG/DL
POC SPECIFIC GRAVITY, URINE: 1.01
POC UROBILINOGEN, URINE: 0.2 EU/DL

## 2024-06-28 PROCEDURE — 3077F SYST BP >= 140 MM HG: CPT | Performed by: INTERNAL MEDICINE

## 2024-06-28 PROCEDURE — 3078F DIAST BP <80 MM HG: CPT | Performed by: INTERNAL MEDICINE

## 2024-06-28 PROCEDURE — 1159F MED LIST DOCD IN RCRD: CPT | Performed by: INTERNAL MEDICINE

## 2024-06-28 PROCEDURE — 99214 OFFICE O/P EST MOD 30 MIN: CPT | Performed by: INTERNAL MEDICINE

## 2024-06-28 PROCEDURE — 81002 URINALYSIS NONAUTO W/O SCOPE: CPT | Performed by: INTERNAL MEDICINE

## 2024-06-28 PROCEDURE — 1160F RVW MEDS BY RX/DR IN RCRD: CPT | Performed by: INTERNAL MEDICINE

## 2024-06-28 PROCEDURE — 83036 HEMOGLOBIN GLYCOSYLATED A1C: CPT | Performed by: INTERNAL MEDICINE

## 2024-06-28 RX ORDER — GABAPENTIN 100 MG/1
200 CAPSULE ORAL 2 TIMES DAILY
Qty: 12 CAPSULE | Refills: 0 | Status: SHIPPED | OUTPATIENT
Start: 2024-06-28 | End: 2024-07-01

## 2024-06-28 RX ORDER — GABAPENTIN 100 MG/1
100 CAPSULE ORAL 2 TIMES DAILY
Qty: 6 CAPSULE | Refills: 0 | Status: SHIPPED | OUTPATIENT
Start: 2024-06-28 | End: 2024-07-01

## 2024-06-28 RX ORDER — HYDROXYZINE HYDROCHLORIDE 25 MG/1
25 TABLET, FILM COATED ORAL NIGHTLY PRN
Qty: 30 TABLET | Refills: 1 | Status: SHIPPED | OUTPATIENT
Start: 2024-06-28 | End: 2024-12-25

## 2024-06-28 RX ORDER — METFORMIN HYDROCHLORIDE 500 MG/1
500 TABLET, EXTENDED RELEASE ORAL
Qty: 90 TABLET | Refills: 1 | Status: SHIPPED | OUTPATIENT
Start: 2024-06-28 | End: 2025-06-28

## 2024-06-28 RX ORDER — GABAPENTIN 100 MG/1
300 CAPSULE ORAL 2 TIMES DAILY
Qty: 180 CAPSULE | Refills: 0 | Status: SHIPPED | OUTPATIENT
Start: 2024-06-28 | End: 2024-07-28

## 2024-06-28 ASSESSMENT — PATIENT HEALTH QUESTIONNAIRE - PHQ9
10. IF YOU CHECKED OFF ANY PROBLEMS, HOW DIFFICULT HAVE THESE PROBLEMS MADE IT FOR YOU TO DO YOUR WORK, TAKE CARE OF THINGS AT HOME, OR GET ALONG WITH OTHER PEOPLE: NOT DIFFICULT AT ALL
4. FEELING TIRED OR HAVING LITTLE ENERGY: NEARLY EVERY DAY
2. FEELING DOWN, DEPRESSED OR HOPELESS: NEARLY EVERY DAY
7. TROUBLE CONCENTRATING ON THINGS, SUCH AS READING THE NEWSPAPER OR WATCHING TELEVISION: NOT AT ALL
6. FEELING BAD ABOUT YOURSELF - OR THAT YOU ARE A FAILURE OR HAVE LET YOURSELF OR YOUR FAMILY DOWN: NOT AT ALL
SUM OF ALL RESPONSES TO PHQ9 QUESTIONS 1 AND 2: 6
1. LITTLE INTEREST OR PLEASURE IN DOING THINGS: NEARLY EVERY DAY
5. POOR APPETITE OR OVEREATING: SEVERAL DAYS
3. TROUBLE FALLING OR STAYING ASLEEP OR SLEEPING TOO MUCH: NEARLY EVERY DAY

## 2024-06-28 NOTE — PROGRESS NOTES
"Patient ID: She states she is doing \"about the same\". She states that she has to urinate 8-9 times per day. She states she feels like she is emptying her bladder ok. She states she had sling placed in her bladder about 20 years ago. She states she continues to have pain over her bilateral shoulders for several months, and it is very itchy. She states she has difficulty sleeping due to itching and pain. She states she never got the gabapentin previously prescribed. She states she has a lot of pain in her left leg. She states that she has a lot of pain in the bottom of her left foot along the inner sole of her foot, states that it is on and off. She states that she has never had gout. She states that she     HPI Lenora Ames is a 68 y.o. female with PMH remarkable for DM type 2, CVA, who presents to the office today for Follow-up (In pain due to shingles; frequent urination, 4-5 times per hour).    HEALTH MAINTENANCE: FOLLOW UP   Last Office Visit: 3/20/24  Mammogram (40-75): ordered previously, but not done yet  Pap smear (21-65, or hysterectomy q5yrs): n/a age  Last Labs: 11/24/23  Colonoscopy (45-75 or age 40 with 1st degree relative dx colon ca):  Lung cancer screening (50-78 y/o + 20 pack year + smoking/quit in last 15 years):   DEXA (65+, q 2 years):     Social History     Tobacco Use    Smoking status: Every Day     Current packs/day: 0.50     Types: Cigarettes    Smokeless tobacco: Never   Vaping Use    Vaping status: Never Used   Substance Use Topics    Alcohol use: Yes     Comment: occasional    Drug use: Yes     Types: Marijuana     Comment: daily use     Review of Systems   Constitutional: Negative.    HENT: Negative.     Respiratory: Negative.     Cardiovascular: Negative.    Gastrointestinal: Negative.    Skin:  Positive for rash.   Neurological:         Shoulder pain from shingles     Visit Vitals  /79   Pulse 76   Resp 20   Ht 1.524 m (5')   Wt 73.9 kg (163 lb)   SpO2 91%   BMI 31.83 kg/m² "   OB Status Postmenopausal   Smoking Status Every Day   BSA 1.77 m²     Physical Exam  Vitals reviewed.   Constitutional:       Appearance: Normal appearance.   HENT:      Head: Normocephalic and atraumatic.   Cardiovascular:      Rate and Rhythm: Normal rate and regular rhythm.      Pulses: Normal pulses.      Heart sounds: Normal heart sounds.   Pulmonary:      Effort: Pulmonary effort is normal.      Breath sounds: Normal breath sounds.   Abdominal:      General: Bowel sounds are normal.      Palpations: Abdomen is soft.   Musculoskeletal:         General: Normal range of motion.   Skin:     General: Skin is warm and dry.      Comments: Multiple scabbed areas from previous shingles rash to bilateral shoulders   Neurological:      General: No focal deficit present.      Mental Status: She is alert and oriented to person, place, and time.   Psychiatric:         Mood and Affect: Mood normal.         Behavior: Behavior normal.       Current Outpatient Medications   Medication Instructions    albuterol 90 mcg/actuation inhaler INHALE TWO PUFFS BY MOUTH AS INSTRUCTED EVERY 4 HOURS AS NEEDED    aspirin 81 mg, oral, Daily    atorvastatin (LIPITOR) 20 mg, oral, Nightly    blood sugar diagnostic (True Metrix Glucose Test Strip) strip USE AS INSTRUCTED TO CHECK BLOOD SUGARS 4 TIMES DAILY.    cyclobenzaprine (FLEXERIL) 10 mg, oral, 3 times daily    escitalopram (LEXAPRO) 10 mg, oral, Daily    gabapentin (NEURONTIN) 100 mg, oral, 2 times daily    gabapentin (NEURONTIN) 200 mg, oral, 2 times daily    gabapentin (NEURONTIN) 300 mg, oral, 2 times daily    HumaLOG KwikPen Insulin 100 unit/mL injection USE AS DIRECTED PER SLIDING SCALE UP TO 24 UNITS TOTAL PER DAY (8 BEFORE EACH MEAL).    Lantus Solostar U-100 Insulin 30 Units, subcutaneous, Nightly    meclizine (Antivert) 25 mg tablet TAKE ONE TABLET BY MOUTH EVERY 8 HOURS AS NEEDED    melatonin 5 mg, oral, Daily PRN    metFORMIN XR (GLUCOPHAGE-XR) 1,000 mg, oral, 2 times daily  "   midodrine (PROAMATINE) 5 mg, oral, 3 times daily    pantoprazole (ProtoNix) 40 mg EC tablet TAKE ONE TABLET BY MOUTH once DAILY IN THE MORNING before a meal **do not crush, chew, or split**    pen needle 1/2\" (Comfort EZ Pen Needles) 29G X 12mm needle 3 each, subcutaneous, Daily, Use as instructed    sucralfate (CARAFATE) 1 g, oral, 2 times daily    tolterodine LA (DETROL LA) 4 mg, oral, Daily, Do not crush, chew, or split.      Lab Results   Component Value Date    WBC 11.6 (H) 11/24/2023    HGB 13.5 11/24/2023    HCT 40.9 11/24/2023     11/24/2023    CHOL 118 03/06/2023    TRIG 301 (H) 03/06/2023    HDL 34.0 (A) 03/06/2023    ALT 17 11/24/2023    AST 17 11/24/2023     11/24/2023    K 4.0 11/24/2023    CL 99 11/24/2023    CREATININE 0.79 11/24/2023    BUN 16 11/24/2023    CO2 27 11/24/2023    INR 1.0 03/05/2023    HGBA1C 7.7 (A) 06/28/2024     Problem List Items Addressed This Visit             ICD-10-CM    Uncontrolled type 2 diabetes mellitus with hypoglycemia (Multi) E11.649    Relevant Medications    metFORMIN  mg 24 hr tablet    Other Relevant Orders    POCT glycosylated hemoglobin (Hb A1C) manually resulted (Completed)    CBC and Auto Differential    Comprehensive metabolic panel    Diabetic neuropathy (Multi) E11.40     stable         Chronic obstructive pulmonary disease, unspecified (Multi) J44.9     stable         Other arterial embolism and thrombosis of abdominal aorta (Multi) I74.09     resolved         Acute respiratory failure with hypoxia (Multi) J96.01    Hypertensive heart disease with heart failure (Multi) I11.0     Stable ,         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     improved          Other Visit Diagnoses         Codes    Pain of toe of left foot    -  Primary M79.675    Relevant Orders    Uric acid    Urinary frequency     R35.0    Relevant Orders    POCT UA (nonautomated) manually resulted (Completed) "    Referral to Urogynecology    CBC and Auto Differential    Incomplete emptying of bladder     R33.9    Relevant Orders    Referral to Urogynecology    Neuropathic pain     M79.2    Relevant Medications    gabapentin (Neurontin) 100 mg capsule    Post herpetic neuralgia     B02.29    Relevant Medications    hydrOXYzine HCL (Atarax) 25 mg tablet    Itching     L29.9    Relevant Medications    hydrOXYzine HCL (Atarax) 25 mg tablet    Hyperlipidemia, unspecified hyperlipidemia type     E78.5    Relevant Orders    Lipid panel    Comprehensive metabolic panel    Hypertension, unspecified type     I10    Relevant Orders    Tsh With Reflex To Free T4 If Abnormal          --------------------  Written by Bebe Gonzalez LPN, acting as a scribe for Dr. Harris. This note accurately reflects the work and decisions made by Dr. Harris.     I, Dr. Harris, attest all medical record entries made by the scribe were under my direction and were personally dictated by me. I have reviewed the chart and agree that the record accurately reflects my performance of the history, physical exam, and assessment and plan.

## 2024-06-28 NOTE — PATIENT INSTRUCTIONS
It was great to see you in the office today! Here is what we discussed at your visit today:  As discussed, avoid scratching the rash on your shoulders so it will start to heal  We will send in two prescriptions: Gabapentin for pain in your shoulders, take 100mg up to three times per day, may take up to 300mg twice daily, but start with 100mg up to three times daily to see if it is effective, only increase it if it is not effective  Make sure you take Gabapentin at night for sure as it will help you with pain, this medication can make you sleepy  Start Hydroxyzine 25mg at bedtime to help you sleep and stop itching, take this at night  We will refer you urogynecology for your urinary symptoms for bladder testing, please schedule this as soon as you are able  I recommend you get a shingles vaccine  Decrease metformin XR to 500mg once per day

## 2024-07-01 ENCOUNTER — TELEPHONE (OUTPATIENT)
Dept: PRIMARY CARE | Facility: CLINIC | Age: 69
End: 2024-07-01
Payer: MEDICARE

## 2024-07-01 DIAGNOSIS — E11.649 UNCONTROLLED TYPE 2 DIABETES MELLITUS WITH HYPOGLYCEMIA, UNSPECIFIED HYPOGLYCEMIA COMA STATUS (MULTI): Primary | ICD-10-CM

## 2024-07-01 PROBLEM — J96.01 ACUTE RESPIRATORY FAILURE WITH HYPOXIA (MULTI): Status: ACTIVE | Noted: 2024-07-01

## 2024-07-01 PROBLEM — I11.0 HYPERTENSIVE HEART DISEASE WITH HEART FAILURE (MULTI): Status: ACTIVE | Noted: 2024-07-01

## 2024-07-01 PROBLEM — I69.331: Status: ACTIVE | Noted: 2024-07-01

## 2024-07-01 PROBLEM — I74.09 OTHER ARTERIAL EMBOLISM AND THROMBOSIS OF ABDOMINAL AORTA (MULTI): Status: ACTIVE | Noted: 2024-07-01

## 2024-07-02 ASSESSMENT — ENCOUNTER SYMPTOMS
GASTROINTESTINAL NEGATIVE: 1
RESPIRATORY NEGATIVE: 1
CARDIOVASCULAR NEGATIVE: 1
CONSTITUTIONAL NEGATIVE: 1

## 2024-07-31 DIAGNOSIS — E11.649 UNCONTROLLED TYPE 2 DIABETES MELLITUS WITH HYPOGLYCEMIA WITHOUT COMA (MULTI): ICD-10-CM

## 2024-07-31 DIAGNOSIS — J45.20 INTERMITTENT ASTHMA, UNSPECIFIED ASTHMA SEVERITY, UNSPECIFIED WHETHER COMPLICATED (HHS-HCC): ICD-10-CM

## 2024-07-31 DIAGNOSIS — L29.9 ITCHING: ICD-10-CM

## 2024-07-31 DIAGNOSIS — R42 DIZZINESS: ICD-10-CM

## 2024-07-31 DIAGNOSIS — K21.9 GASTROESOPHAGEAL REFLUX DISEASE WITHOUT ESOPHAGITIS: ICD-10-CM

## 2024-07-31 DIAGNOSIS — B02.29 POST HERPETIC NEURALGIA: ICD-10-CM

## 2024-07-31 RX ORDER — INSULIN GLARGINE 100 [IU]/ML
30 INJECTION, SOLUTION SUBCUTANEOUS NIGHTLY
Qty: 9 ML | Refills: 0 | Status: SHIPPED | OUTPATIENT
Start: 2024-07-31

## 2024-07-31 RX ORDER — PANTOPRAZOLE SODIUM 40 MG/1
40 TABLET, DELAYED RELEASE ORAL
Qty: 90 TABLET | Refills: 0 | Status: SHIPPED | OUTPATIENT
Start: 2024-07-31

## 2024-07-31 RX ORDER — ALBUTEROL SULFATE 90 UG/1
2 AEROSOL, METERED RESPIRATORY (INHALATION) EVERY 6 HOURS PRN
Qty: 18 G | Refills: 0 | Status: SHIPPED | OUTPATIENT
Start: 2024-07-31

## 2024-07-31 RX ORDER — HYDROXYZINE HYDROCHLORIDE 25 MG/1
25 TABLET, FILM COATED ORAL NIGHTLY PRN
Qty: 30 TABLET | Refills: 1 | Status: SHIPPED | OUTPATIENT
Start: 2024-07-31 | End: 2025-01-27

## 2024-07-31 RX ORDER — MECLIZINE HYDROCHLORIDE 25 MG/1
25 TABLET ORAL EVERY 8 HOURS PRN
Qty: 90 TABLET | Refills: 0 | Status: SHIPPED | OUTPATIENT
Start: 2024-07-31

## 2024-07-31 RX ORDER — SUCRALFATE 1 G/1
1 TABLET ORAL 2 TIMES DAILY
Qty: 180 TABLET | Refills: 0 | Status: SHIPPED | OUTPATIENT
Start: 2024-07-31

## 2024-08-07 ENCOUNTER — APPOINTMENT (OUTPATIENT)
Dept: RADIOLOGY | Facility: HOSPITAL | Age: 69
End: 2024-08-07
Payer: MEDICARE

## 2024-08-07 ENCOUNTER — APPOINTMENT (OUTPATIENT)
Dept: CARDIOLOGY | Facility: HOSPITAL | Age: 69
End: 2024-08-07
Payer: MEDICARE

## 2024-08-07 ENCOUNTER — HOSPITAL ENCOUNTER (OUTPATIENT)
Facility: HOSPITAL | Age: 69
Setting detail: OBSERVATION
Discharge: HOME | End: 2024-08-08
Attending: FAMILY MEDICINE | Admitting: INTERNAL MEDICINE
Payer: MEDICARE
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
## 2024-08-07 DIAGNOSIS — Z86.73 HISTORY OF STROKE: ICD-10-CM
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
## 2024-08-07 DIAGNOSIS — S09.90XA INJURY OF HEAD, INITIAL ENCOUNTER: ICD-10-CM

## 2024-08-07 DIAGNOSIS — Z78.9 ALCOHOL USE: ICD-10-CM

## 2024-08-07 PROBLEM — F10.10 ETOH ABUSE: Status: ACTIVE | Noted: 2024-08-07

## 2024-08-07 PROBLEM — I10 PRIMARY HYPERTENSION: Status: ACTIVE | Noted: 2024-08-07
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
## 2024-08-07 PROBLEM — D72.829 LEUKOCYTOSIS: Status: ACTIVE | Noted: 2024-08-07

## 2024-08-07 PROBLEM — N32.81 OAB (OVERACTIVE BLADDER): Status: ACTIVE | Noted: 2024-08-07

## 2024-08-07 PROBLEM — F10.90 ALCOHOL USE: Status: RESOLVED | Noted: 2024-08-07 | Resolved: 2024-08-07

## 2024-08-07 PROBLEM — F10.90 ALCOHOL USE: Status: ACTIVE | Noted: 2024-08-07
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
## 2024-08-07 LAB
ALBUMIN SERPL BCP-MCNC: 4.2 G/DL (ref 3.4–5)
ALP SERPL-CCNC: 80 U/L (ref 33–136)
ALT SERPL W P-5'-P-CCNC: 13 U/L (ref 7–45)
AMPHETAMINES UR QL SCN: ABNORMAL
ANION GAP SERPL CALC-SCNC: 19 MMOL/L (ref 10–20)
APPEARANCE UR: CLEAR
AST SERPL W P-5'-P-CCNC: 20 U/L (ref 9–39)
BARBITURATES UR QL SCN: ABNORMAL
BASOPHILS # BLD AUTO: 0.11 X10*3/UL (ref 0–0.1)
BASOPHILS NFR BLD AUTO: 0.9 %
BENZODIAZ UR QL SCN: ABNORMAL
BILIRUB SERPL-MCNC: 0.3 MG/DL (ref 0–1.2)
BILIRUB UR STRIP.AUTO-MCNC: NEGATIVE MG/DL
BNP SERPL-MCNC: 25 PG/ML (ref 0–99)
BUN SERPL-MCNC: 14 MG/DL (ref 6–23)
BZE UR QL SCN: ABNORMAL
CALCIUM SERPL-MCNC: 10 MG/DL (ref 8.6–10.3)
CANNABINOIDS UR QL SCN: ABNORMAL
CARDIAC TROPONIN I PNL SERPL HS: 4 NG/L (ref 0–13)
CARDIAC TROPONIN I PNL SERPL HS: 4 NG/L (ref 0–13)
CHLORIDE SERPL-SCNC: 102 MMOL/L (ref 98–107)
CO2 SERPL-SCNC: 21 MMOL/L (ref 21–32)
COLOR UR: COLORLESS
CREAT SERPL-MCNC: 0.72 MG/DL (ref 0.5–1.05)
EGFRCR SERPLBLD CKD-EPI 2021: >90 ML/MIN/1.73M*2
EOSINOPHIL # BLD AUTO: 0.24 X10*3/UL (ref 0–0.7)
EOSINOPHIL NFR BLD AUTO: 2 %
ERYTHROCYTE [DISTWIDTH] IN BLOOD BY AUTOMATED COUNT: 13.9 % (ref 11.5–14.5)
ETHANOL SERPL-MCNC: 100 MG/DL
FENTANYL+NORFENTANYL UR QL SCN: ABNORMAL
GLUCOSE BLD MANUAL STRIP-MCNC: 124 MG/DL (ref 74–99)
GLUCOSE BLD MANUAL STRIP-MCNC: 152 MG/DL (ref 74–99)
GLUCOSE BLD MANUAL STRIP-MCNC: 212 MG/DL (ref 74–99)
GLUCOSE BLD MANUAL STRIP-MCNC: 84 MG/DL (ref 74–99)
GLUCOSE SERPL-MCNC: 97 MG/DL (ref 74–99)
GLUCOSE UR STRIP.AUTO-MCNC: NORMAL MG/DL
HCT VFR BLD AUTO: 41.8 % (ref 36–46)
HGB BLD-MCNC: 13.6 G/DL (ref 12–16)
IMM GRANULOCYTES # BLD AUTO: 0.05 X10*3/UL (ref 0–0.7)
IMM GRANULOCYTES NFR BLD AUTO: 0.4 % (ref 0–0.9)
INR PPP: 1 (ref 0.9–1.1)
KETONES UR STRIP.AUTO-MCNC: NEGATIVE MG/DL
LACTATE SERPL-SCNC: 2.6 MMOL/L (ref 0.4–2)
LACTATE SERPL-SCNC: 3.5 MMOL/L (ref 0.4–2)
LEUKOCYTE ESTERASE UR QL STRIP.AUTO: NEGATIVE
LYMPHOCYTES # BLD AUTO: 4.05 X10*3/UL (ref 1.2–4.8)
LYMPHOCYTES NFR BLD AUTO: 33.4 %
MAGNESIUM SERPL-MCNC: 1.69 MG/DL (ref 1.6–2.4)
MCH RBC QN AUTO: 28.1 PG (ref 26–34)
MCHC RBC AUTO-ENTMCNC: 32.5 G/DL (ref 32–36)
MCV RBC AUTO: 86 FL (ref 80–100)
METHADONE UR QL SCN: ABNORMAL
MONOCYTES # BLD AUTO: 0.88 X10*3/UL (ref 0.1–1)
MONOCYTES NFR BLD AUTO: 7.3 %
NEUTROPHILS # BLD AUTO: 6.78 X10*3/UL (ref 1.2–7.7)
NEUTROPHILS NFR BLD AUTO: 56 %
NITRITE UR QL STRIP.AUTO: NEGATIVE
NRBC BLD-RTO: 0 /100 WBCS (ref 0–0)
OPIATES UR QL SCN: ABNORMAL
OXYCODONE+OXYMORPHONE UR QL SCN: ABNORMAL
PCP UR QL SCN: ABNORMAL
PH UR STRIP.AUTO: 5.5 [PH]
PLATELET # BLD AUTO: 305 X10*3/UL (ref 150–450)
POTASSIUM SERPL-SCNC: 3.8 MMOL/L (ref 3.5–5.3)
PROT SERPL-MCNC: 7.4 G/DL (ref 6.4–8.2)
PROT UR STRIP.AUTO-MCNC: NEGATIVE MG/DL
PROTHROMBIN TIME: 10.8 SECONDS (ref 9.8–12.8)
RBC # BLD AUTO: 4.84 X10*6/UL (ref 4–5.2)
RBC # UR STRIP.AUTO: NEGATIVE /UL
SODIUM SERPL-SCNC: 138 MMOL/L (ref 136–145)
SP GR UR STRIP.AUTO: 1
UROBILINOGEN UR STRIP.AUTO-MCNC: NORMAL MG/DL
WBC # BLD AUTO: 12.1 X10*3/UL (ref 4.4–11.3)

## 2024-08-07 PROCEDURE — 84484 ASSAY OF TROPONIN QUANT: CPT | Performed by: FAMILY MEDICINE

## 2024-08-07 PROCEDURE — 81003 URINALYSIS AUTO W/O SCOPE: CPT | Performed by: FAMILY MEDICINE

## 2024-08-07 PROCEDURE — 80307 DRUG TEST PRSMV CHEM ANLYZR: CPT | Performed by: FAMILY MEDICINE

## 2024-08-07 PROCEDURE — 36415 COLL VENOUS BLD VENIPUNCTURE: CPT | Performed by: FAMILY MEDICINE

## 2024-08-07 PROCEDURE — 2500000002 HC RX 250 W HCPCS SELF ADMINISTERED DRUGS (ALT 637 FOR MEDICARE OP, ALT 636 FOR OP/ED)

## 2024-08-07 PROCEDURE — 83735 ASSAY OF MAGNESIUM: CPT | Performed by: FAMILY MEDICINE

## 2024-08-07 PROCEDURE — 96372 THER/PROPH/DIAG INJ SC/IM: CPT | Performed by: NURSE PRACTITIONER

## 2024-08-07 PROCEDURE — 94760 N-INVAS EAR/PLS OXIMETRY 1: CPT

## 2024-08-07 PROCEDURE — 82077 ASSAY SPEC XCP UR&BREATH IA: CPT | Performed by: FAMILY MEDICINE

## 2024-08-07 PROCEDURE — 2500000004 HC RX 250 GENERAL PHARMACY W/ HCPCS (ALT 636 FOR OP/ED): Performed by: NURSE PRACTITIONER

## 2024-08-07 PROCEDURE — 2500000005 HC RX 250 GENERAL PHARMACY W/O HCPCS: Performed by: INTERNAL MEDICINE

## 2024-08-07 PROCEDURE — 2500000004 HC RX 250 GENERAL PHARMACY W/ HCPCS (ALT 636 FOR OP/ED)

## 2024-08-07 PROCEDURE — 96374 THER/PROPH/DIAG INJ IV PUSH: CPT | Mod: 59

## 2024-08-07 PROCEDURE — 93005 ELECTROCARDIOGRAM TRACING: CPT

## 2024-08-07 PROCEDURE — 83880 ASSAY OF NATRIURETIC PEPTIDE: CPT | Performed by: FAMILY MEDICINE

## 2024-08-07 PROCEDURE — 70450 CT HEAD/BRAIN W/O DYE: CPT

## 2024-08-07 PROCEDURE — 99285 EMERGENCY DEPT VISIT HI MDM: CPT | Mod: 25

## 2024-08-07 PROCEDURE — 2500000001 HC RX 250 WO HCPCS SELF ADMINISTERED DRUGS (ALT 637 FOR MEDICARE OP)

## 2024-08-07 PROCEDURE — G0378 HOSPITAL OBSERVATION PER HR: HCPCS

## 2024-08-07 PROCEDURE — 70450 CT HEAD/BRAIN W/O DYE: CPT | Performed by: RADIOLOGY

## 2024-08-07 PROCEDURE — 97161 PT EVAL LOW COMPLEX 20 MIN: CPT | Mod: GP | Performed by: PHYSICAL THERAPIST

## 2024-08-07 PROCEDURE — 72125 CT NECK SPINE W/O DYE: CPT

## 2024-08-07 PROCEDURE — 2500000001 HC RX 250 WO HCPCS SELF ADMINISTERED DRUGS (ALT 637 FOR MEDICARE OP): Performed by: NURSE PRACTITIONER

## 2024-08-07 PROCEDURE — 84075 ASSAY ALKALINE PHOSPHATASE: CPT | Performed by: FAMILY MEDICINE

## 2024-08-07 PROCEDURE — 83605 ASSAY OF LACTIC ACID: CPT | Performed by: FAMILY MEDICINE

## 2024-08-07 PROCEDURE — 72125 CT NECK SPINE W/O DYE: CPT | Performed by: RADIOLOGY

## 2024-08-07 PROCEDURE — 85610 PROTHROMBIN TIME: CPT | Performed by: FAMILY MEDICINE

## 2024-08-07 PROCEDURE — 97535 SELF CARE MNGMENT TRAINING: CPT | Mod: GO

## 2024-08-07 PROCEDURE — 2500000004 HC RX 250 GENERAL PHARMACY W/ HCPCS (ALT 636 FOR OP/ED): Mod: SE | Performed by: FAMILY MEDICINE

## 2024-08-07 PROCEDURE — 82947 ASSAY GLUCOSE BLOOD QUANT: CPT

## 2024-08-07 PROCEDURE — 71250 CT THORAX DX C-: CPT

## 2024-08-07 PROCEDURE — 85025 COMPLETE CBC W/AUTO DIFF WBC: CPT | Performed by: FAMILY MEDICINE

## 2024-08-07 PROCEDURE — 71250 CT THORAX DX C-: CPT | Performed by: RADIOLOGY

## 2024-08-07 PROCEDURE — 97166 OT EVAL MOD COMPLEX 45 MIN: CPT | Mod: GO

## 2024-08-07 RX ORDER — MIDODRINE HYDROCHLORIDE 5 MG/1
5 TABLET ORAL 3 TIMES DAILY
Status: DISCONTINUED | OUTPATIENT
Start: 2024-08-07 | End: 2024-08-07

## 2024-08-07 RX ORDER — LORAZEPAM 0.5 MG/1
0.5 TABLET ORAL EVERY 2 HOUR PRN
Status: DISCONTINUED | OUTPATIENT
Start: 2024-08-07 | End: 2024-08-08 | Stop reason: HOSPADM

## 2024-08-07 RX ORDER — GUAIFENESIN/DEXTROMETHORPHAN 100-10MG/5
5 SYRUP ORAL EVERY 4 HOURS PRN
Status: DISCONTINUED | OUTPATIENT
Start: 2024-08-07 | End: 2024-08-08 | Stop reason: HOSPADM

## 2024-08-07 RX ORDER — DEXTROSE 50 % IN WATER (D50W) INTRAVENOUS SYRINGE
12.5
Status: DISCONTINUED | OUTPATIENT
Start: 2024-08-07 | End: 2024-08-08 | Stop reason: HOSPADM

## 2024-08-07 RX ORDER — CYCLOBENZAPRINE HCL 5 MG
10 TABLET ORAL 3 TIMES DAILY PRN
Status: DISCONTINUED | OUTPATIENT
Start: 2024-08-07 | End: 2024-08-08 | Stop reason: HOSPADM

## 2024-08-07 RX ORDER — METFORMIN HYDROCHLORIDE 500 MG/1
500 TABLET, EXTENDED RELEASE ORAL
Status: DISCONTINUED | OUTPATIENT
Start: 2024-08-07 | End: 2024-08-08 | Stop reason: HOSPADM

## 2024-08-07 RX ORDER — PANTOPRAZOLE SODIUM 40 MG/10ML
40 INJECTION, POWDER, LYOPHILIZED, FOR SOLUTION INTRAVENOUS
Status: DISCONTINUED | OUTPATIENT
Start: 2024-08-07 | End: 2024-08-07

## 2024-08-07 RX ORDER — MECLIZINE HCL 12.5 MG 12.5 MG/1
25 TABLET ORAL EVERY 8 HOURS PRN
Status: DISCONTINUED | OUTPATIENT
Start: 2024-08-07 | End: 2024-08-08 | Stop reason: HOSPADM

## 2024-08-07 RX ORDER — DEXTROSE 50 % IN WATER (D50W) INTRAVENOUS SYRINGE
25
Status: DISCONTINUED | OUTPATIENT
Start: 2024-08-07 | End: 2024-08-08 | Stop reason: HOSPADM

## 2024-08-07 RX ORDER — NAPROXEN SODIUM 220 MG/1
81 TABLET, FILM COATED ORAL DAILY
Status: DISCONTINUED | OUTPATIENT
Start: 2024-08-07 | End: 2024-08-08 | Stop reason: HOSPADM

## 2024-08-07 RX ORDER — ATORVASTATIN CALCIUM 10 MG/1
20 TABLET, FILM COATED ORAL NIGHTLY
Status: DISCONTINUED | OUTPATIENT
Start: 2024-08-07 | End: 2024-08-08 | Stop reason: HOSPADM

## 2024-08-07 RX ORDER — CYCLOBENZAPRINE HCL 5 MG
10 TABLET ORAL 3 TIMES DAILY
Status: DISCONTINUED | OUTPATIENT
Start: 2024-08-07 | End: 2024-08-07

## 2024-08-07 RX ORDER — SUCRALFATE 1 G/1
1 TABLET ORAL 2 TIMES DAILY
Status: DISCONTINUED | OUTPATIENT
Start: 2024-08-07 | End: 2024-08-07

## 2024-08-07 RX ORDER — CALCIUM CARBONATE 200(500)MG
TABLET,CHEWABLE ORAL
Status: COMPLETED
Start: 2024-08-07 | End: 2024-08-07

## 2024-08-07 RX ORDER — SODIUM CHLORIDE 9 MG/ML
125 INJECTION, SOLUTION INTRAVENOUS CONTINUOUS
Status: DISCONTINUED | OUTPATIENT
Start: 2024-08-07 | End: 2024-08-07

## 2024-08-07 RX ORDER — ACETAMINOPHEN 325 MG/1
650 TABLET ORAL EVERY 4 HOURS PRN
Status: DISCONTINUED | OUTPATIENT
Start: 2024-08-07 | End: 2024-08-08 | Stop reason: HOSPADM

## 2024-08-07 RX ORDER — ONDANSETRON 4 MG/1
4 TABLET, FILM COATED ORAL EVERY 8 HOURS PRN
Status: DISCONTINUED | OUTPATIENT
Start: 2024-08-07 | End: 2024-08-08 | Stop reason: HOSPADM

## 2024-08-07 RX ORDER — FOLIC ACID 1 MG/1
1 TABLET ORAL DAILY
Status: DISCONTINUED | OUTPATIENT
Start: 2024-08-07 | End: 2024-08-08 | Stop reason: HOSPADM

## 2024-08-07 RX ORDER — MIDODRINE HYDROCHLORIDE 5 MG/1
5 TABLET ORAL EVERY 8 HOURS PRN
Status: DISCONTINUED | OUTPATIENT
Start: 2024-08-07 | End: 2024-08-08 | Stop reason: HOSPADM

## 2024-08-07 RX ORDER — ONDANSETRON HYDROCHLORIDE 2 MG/ML
INJECTION, SOLUTION INTRAVENOUS
Status: COMPLETED
Start: 2024-08-07 | End: 2024-08-07

## 2024-08-07 RX ORDER — LANOLIN ALCOHOL/MO/W.PET/CERES
100 CREAM (GRAM) TOPICAL DAILY
Status: DISCONTINUED | OUTPATIENT
Start: 2024-08-07 | End: 2024-08-08 | Stop reason: HOSPADM

## 2024-08-07 RX ORDER — INSULIN LISPRO 100 [IU]/ML
0-20 INJECTION, SOLUTION INTRAVENOUS; SUBCUTANEOUS
Status: DISCONTINUED | OUTPATIENT
Start: 2024-08-07 | End: 2024-08-08 | Stop reason: HOSPADM

## 2024-08-07 RX ORDER — ESCITALOPRAM OXALATE 10 MG/1
10 TABLET ORAL DAILY
Status: DISCONTINUED | OUTPATIENT
Start: 2024-08-07 | End: 2024-08-08 | Stop reason: HOSPADM

## 2024-08-07 RX ORDER — HEPARIN SODIUM 5000 [USP'U]/ML
5000 INJECTION, SOLUTION INTRAVENOUS; SUBCUTANEOUS EVERY 8 HOURS
Status: DISCONTINUED | OUTPATIENT
Start: 2024-08-07 | End: 2024-08-08 | Stop reason: HOSPADM

## 2024-08-07 RX ORDER — SODIUM CHLORIDE 9 MG/ML
75 INJECTION, SOLUTION INTRAVENOUS CONTINUOUS
Status: DISCONTINUED | OUTPATIENT
Start: 2024-08-07 | End: 2024-08-08 | Stop reason: HOSPADM

## 2024-08-07 RX ORDER — MULTIVIT-MIN/IRON FUM/FOLIC AC 7.5 MG-4
1 TABLET ORAL DAILY
Status: DISCONTINUED | OUTPATIENT
Start: 2024-08-07 | End: 2024-08-07 | Stop reason: CLARIF

## 2024-08-07 RX ORDER — HYDROXYZINE HYDROCHLORIDE 25 MG/1
25 TABLET, FILM COATED ORAL NIGHTLY PRN
Status: DISCONTINUED | OUTPATIENT
Start: 2024-08-07 | End: 2024-08-08 | Stop reason: HOSPADM

## 2024-08-07 RX ORDER — ACETAMINOPHEN 500 MG
5 TABLET ORAL NIGHTLY PRN
Status: DISCONTINUED | OUTPATIENT
Start: 2024-08-07 | End: 2024-08-08 | Stop reason: HOSPADM

## 2024-08-07 RX ORDER — GUAIFENESIN 600 MG/1
600 TABLET, EXTENDED RELEASE ORAL EVERY 12 HOURS PRN
Status: DISCONTINUED | OUTPATIENT
Start: 2024-08-07 | End: 2024-08-08 | Stop reason: HOSPADM

## 2024-08-07 RX ORDER — ONDANSETRON HYDROCHLORIDE 2 MG/ML
4 INJECTION, SOLUTION INTRAVENOUS EVERY 8 HOURS PRN
Status: DISCONTINUED | OUTPATIENT
Start: 2024-08-07 | End: 2024-08-08 | Stop reason: HOSPADM

## 2024-08-07 RX ORDER — INSULIN GLARGINE 100 [IU]/ML
30 INJECTION, SOLUTION SUBCUTANEOUS NIGHTLY
Status: DISCONTINUED | OUTPATIENT
Start: 2024-08-07 | End: 2024-08-08 | Stop reason: HOSPADM

## 2024-08-07 RX ORDER — GABAPENTIN 100 MG/1
100 CAPSULE ORAL 2 TIMES DAILY
Status: DISCONTINUED | OUTPATIENT
Start: 2024-08-07 | End: 2024-08-07

## 2024-08-07 RX ORDER — BISMUTH SUBSALICYLATE 262 MG
1 TABLET,CHEWABLE ORAL DAILY
Status: DISCONTINUED | OUTPATIENT
Start: 2024-08-07 | End: 2024-08-08 | Stop reason: HOSPADM

## 2024-08-07 RX ORDER — OXYBUTYNIN CHLORIDE 5 MG/1
5 TABLET ORAL 3 TIMES DAILY
Status: DISCONTINUED | OUTPATIENT
Start: 2024-08-07 | End: 2024-08-08 | Stop reason: HOSPADM

## 2024-08-07 RX ORDER — SODIUM CHLORIDE 9 MG/ML
10 INJECTION, SOLUTION INTRAVENOUS CONTINUOUS PRN
Status: DISCONTINUED | OUTPATIENT
Start: 2024-08-07 | End: 2024-08-08 | Stop reason: HOSPADM

## 2024-08-07 RX ORDER — AMOXICILLIN 250 MG
1 CAPSULE ORAL 2 TIMES DAILY
Status: DISCONTINUED | OUTPATIENT
Start: 2024-08-07 | End: 2024-08-08 | Stop reason: HOSPADM

## 2024-08-07 RX ORDER — LORAZEPAM 0.5 MG/1
1 TABLET ORAL EVERY 2 HOUR PRN
Status: DISCONTINUED | OUTPATIENT
Start: 2024-08-07 | End: 2024-08-08 | Stop reason: HOSPADM

## 2024-08-07 RX ORDER — PANTOPRAZOLE SODIUM 40 MG/1
40 TABLET, DELAYED RELEASE ORAL
Status: DISCONTINUED | OUTPATIENT
Start: 2024-08-07 | End: 2024-08-08 | Stop reason: HOSPADM

## 2024-08-07 RX ORDER — CALCIUM CARBONATE 200(500)MG
500 TABLET,CHEWABLE ORAL 4 TIMES DAILY PRN
Status: DISCONTINUED | OUTPATIENT
Start: 2024-08-07 | End: 2024-08-08 | Stop reason: HOSPADM

## 2024-08-07 RX ORDER — SODIUM CHLORIDE 9 MG/ML
INJECTION, SOLUTION INTRAVENOUS
Status: COMPLETED
Start: 2024-08-07 | End: 2024-08-07

## 2024-08-07 RX ORDER — LORAZEPAM 0.5 MG/1
2 TABLET ORAL EVERY 2 HOUR PRN
Status: DISCONTINUED | OUTPATIENT
Start: 2024-08-07 | End: 2024-08-08 | Stop reason: HOSPADM

## 2024-08-07 RX ORDER — PANTOPRAZOLE SODIUM 40 MG/1
40 TABLET, DELAYED RELEASE ORAL
Status: DISCONTINUED | OUTPATIENT
Start: 2024-08-07 | End: 2024-08-07

## 2024-08-07 SDOH — SOCIAL STABILITY: SOCIAL INSECURITY: DOES ANYONE TRY TO KEEP YOU FROM HAVING/CONTACTING OTHER FRIENDS OR DOING THINGS OUTSIDE YOUR HOME?: NO

## 2024-08-07 SDOH — SOCIAL STABILITY: SOCIAL INSECURITY: ARE YOU OR HAVE YOU BEEN THREATENED OR ABUSED PHYSICALLY, EMOTIONALLY, OR SEXUALLY BY ANYONE?: YES

## 2024-08-07 SDOH — SOCIAL STABILITY: SOCIAL INSECURITY: ARE THERE ANY APPARENT SIGNS OF INJURIES/BEHAVIORS THAT COULD BE RELATED TO ABUSE/NEGLECT?: YES

## 2024-08-07 SDOH — SOCIAL STABILITY: SOCIAL INSECURITY: ABUSE: ADULT

## 2024-08-07 SDOH — SOCIAL STABILITY: SOCIAL INSECURITY: HAVE YOU HAD THOUGHTS OF HARMING ANYONE ELSE?: NO

## 2024-08-07 SDOH — SOCIAL STABILITY: SOCIAL INSECURITY: DO YOU FEEL UNSAFE GOING BACK TO THE PLACE WHERE YOU ARE LIVING?: NO

## 2024-08-07 SDOH — SOCIAL STABILITY: SOCIAL INSECURITY: DO YOU FEEL ANYONE HAS EXPLOITED OR TAKEN ADVANTAGE OF YOU FINANCIALLY OR OF YOUR PERSONAL PROPERTY?: YES

## 2024-08-07 SDOH — SOCIAL STABILITY: SOCIAL INSECURITY: WERE YOU ABLE TO COMPLETE ALL THE BEHAVIORAL HEALTH SCREENINGS?: YES

## 2024-08-07 SDOH — SOCIAL STABILITY: SOCIAL INSECURITY: HAVE YOU HAD ANY THOUGHTS OF HARMING ANYONE ELSE?: NO

## 2024-08-07 SDOH — SOCIAL STABILITY: SOCIAL INSECURITY: HAS ANYONE EVER THREATENED TO HURT YOUR FAMILY OR YOUR PETS?: NO

## 2024-08-07 ASSESSMENT — COGNITIVE AND FUNCTIONAL STATUS - GENERAL
MOBILITY SCORE: 18
PERSONAL GROOMING: A LITTLE
TOILETING: A LITTLE
CLIMB 3 TO 5 STEPS WITH RAILING: A LOT
HELP NEEDED FOR BATHING: A LOT
TOILETING: A LOT
MOBILITY SCORE: 19
DRESSING REGULAR UPPER BODY CLOTHING: A LOT
HELP NEEDED FOR BATHING: A LOT
DAILY ACTIVITIY SCORE: 15
WALKING IN HOSPITAL ROOM: A LITTLE
CLIMB 3 TO 5 STEPS WITH RAILING: A LOT
DRESSING REGULAR UPPER BODY CLOTHING: A LOT
CLIMB 3 TO 5 STEPS WITH RAILING: A LOT
PATIENT BASELINE BEDBOUND: NO
HELP NEEDED FOR BATHING: A LITTLE
STANDING UP FROM CHAIR USING ARMS: A LITTLE
WALKING IN HOSPITAL ROOM: A LITTLE
STANDING UP FROM CHAIR USING ARMS: A LITTLE
DRESSING REGULAR UPPER BODY CLOTHING: A LITTLE
DRESSING REGULAR LOWER BODY CLOTHING: A LOT
MOVING TO AND FROM BED TO CHAIR: A LITTLE
DAILY ACTIVITIY SCORE: 19
DRESSING REGULAR LOWER BODY CLOTHING: A LITTLE
STANDING UP FROM CHAIR USING ARMS: A LITTLE
TURNING FROM BACK TO SIDE WHILE IN FLAT BAD: A LITTLE
MOVING TO AND FROM BED TO CHAIR: A LITTLE
MOBILITY SCORE: 19
MOVING TO AND FROM BED TO CHAIR: A LITTLE
DAILY ACTIVITIY SCORE: 15
PERSONAL GROOMING: A LITTLE
TOILETING: A LOT
WALKING IN HOSPITAL ROOM: A LITTLE
PERSONAL GROOMING: A LITTLE
DRESSING REGULAR LOWER BODY CLOTHING: A LOT

## 2024-08-07 ASSESSMENT — PAIN DESCRIPTION - PROGRESSION: CLINICAL_PROGRESSION: NOT CHANGED

## 2024-08-07 ASSESSMENT — ENCOUNTER SYMPTOMS
EYES NEGATIVE: 1
ACTIVITY CHANGE: 1
HEMATOLOGIC/LYMPHATIC NEGATIVE: 1
CARDIOVASCULAR NEGATIVE: 1
MUSCULOSKELETAL NEGATIVE: 1
NEUROLOGICAL NEGATIVE: 1
RESPIRATORY NEGATIVE: 1
GASTROINTESTINAL NEGATIVE: 1
PSYCHIATRIC NEGATIVE: 1
ENDOCRINE NEGATIVE: 1
WOUND: 1
ALLERGIC/IMMUNOLOGIC NEGATIVE: 1

## 2024-08-07 ASSESSMENT — LIFESTYLE VARIABLES
PULSE: 81
TREMOR: NO TREMOR
TOTAL SCORE: 0
ANXIETY: MILDLY ANXIOUS
AUDITORY DISTURBANCES: NOT PRESENT
AUDITORY DISTURBANCES: NOT PRESENT
TACTILE DISTURBANCES: VERY MILD ITCHING, PINS AND NEEDLES, BURNING OR NUMBNESS
ANXIETY: MILDLY ANXIOUS
HEADACHE, FULLNESS IN HEAD: NOT PRESENT
VISUAL DISTURBANCES: NOT PRESENT
HEADACHE, FULLNESS IN HEAD: NOT PRESENT
NAUSEA AND VOMITING: NO NAUSEA AND NO VOMITING
TOTAL SCORE: 2
HEADACHE, FULLNESS IN HEAD: NOT PRESENT
AUDIT-C TOTAL SCORE: 2
VISUAL DISTURBANCES: NOT PRESENT
VISUAL DISTURBANCES: NOT PRESENT
PAROXYSMAL SWEATS: NO SWEAT VISIBLE
ORIENTATION AND CLOUDING OF SENSORIUM: ORIENTED AND CAN DO SERIAL ADDITIONS
SKIP TO QUESTIONS 9-10: 1
PRESCIPTION_ABUSE_PAST_12_MONTHS: NO
TOTAL SCORE: 1
PAROXYSMAL SWEATS: NO SWEAT VISIBLE
TOTAL SCORE: 5
TOTAL SCORE: 0
BLOOD PRESSURE: 142/80
VISUAL DISTURBANCES: NOT PRESENT
BLOOD PRESSURE: 146/88
TREMOR: NO TREMOR
TREMOR: NO TREMOR
ANXIETY: NO ANXIETY, AT EASE
ORIENTATION AND CLOUDING OF SENSORIUM: ORIENTED AND CAN DO SERIAL ADDITIONS
TREMOR: NO TREMOR
AGITATION: NORMAL ACTIVITY
TOTAL SCORE: 3
AUDITORY DISTURBANCES: NOT PRESENT
ANXIETY: MILDLY ANXIOUS
NAUSEA AND VOMITING: NO NAUSEA AND NO VOMITING
PAROXYSMAL SWEATS: NO SWEAT VISIBLE
AUDITORY DISTURBANCES: NOT PRESENT
HEADACHE, FULLNESS IN HEAD: NOT PRESENT
AUDITORY DISTURBANCES: NOT PRESENT
NAUSEA AND VOMITING: MILD NAUSEA WITH NO VOMITING
NAUSEA AND VOMITING: NO NAUSEA AND NO VOMITING
TREMOR: NOT VISIBLE, BUT CAN BE FELT FINGERTIP TO FINGERTIP
TREMOR: NO TREMOR
TOTAL SCORE: 1
AGITATION: NORMAL ACTIVITY
ORIENTATION AND CLOUDING OF SENSORIUM: ORIENTED AND CAN DO SERIAL ADDITIONS
VISUAL DISTURBANCES: NOT PRESENT
HEADACHE, FULLNESS IN HEAD: VERY MILD
VISUAL DISTURBANCES: NOT PRESENT
NAUSEA AND VOMITING: MILD NAUSEA WITH NO VOMITING
ANXIETY: MILDLY ANXIOUS
PULSE: 93
VISUAL DISTURBANCES: NOT PRESENT
AGITATION: NORMAL ACTIVITY
VISUAL DISTURBANCES: NOT PRESENT
HEADACHE, FULLNESS IN HEAD: NOT PRESENT
NAUSEA AND VOMITING: NO NAUSEA AND NO VOMITING
AGITATION: NORMAL ACTIVITY
ORIENTATION AND CLOUDING OF SENSORIUM: ORIENTED AND CAN DO SERIAL ADDITIONS
TREMOR: NO TREMOR
AUDITORY DISTURBANCES: NOT PRESENT
ANXIETY: NO ANXIETY, AT EASE
HEADACHE, FULLNESS IN HEAD: MODERATE
HOW OFTEN DO YOU HAVE A DRINK CONTAINING ALCOHOL: 2-4 TIMES A MONTH
HEADACHE, FULLNESS IN HEAD: VERY MILD
PAROXYSMAL SWEATS: NO SWEAT VISIBLE
VISUAL DISTURBANCES: NOT PRESENT
PAROXYSMAL SWEATS: NO SWEAT VISIBLE
HOW MANY STANDARD DRINKS CONTAINING ALCOHOL DO YOU HAVE ON A TYPICAL DAY: 1 OR 2
AUDITORY DISTURBANCES: NOT PRESENT
NAUSEA AND VOMITING: MILD NAUSEA WITH NO VOMITING
ORIENTATION AND CLOUDING OF SENSORIUM: ORIENTED AND CAN DO SERIAL ADDITIONS
SUBSTANCE_ABUSE_PAST_12_MONTHS: YES
ORIENTATION AND CLOUDING OF SENSORIUM: ORIENTED AND CAN DO SERIAL ADDITIONS
AUDITORY DISTURBANCES: NOT PRESENT
TOTAL SCORE: 2
HOW OFTEN DO YOU HAVE 6 OR MORE DRINKS ON ONE OCCASION: NEVER
TOTAL SCORE: 3
NAUSEA AND VOMITING: MILD NAUSEA WITH NO VOMITING
PAROXYSMAL SWEATS: NO SWEAT VISIBLE
ANXIETY: MILDLY ANXIOUS
TREMOR: NO TREMOR
ORIENTATION AND CLOUDING OF SENSORIUM: ORIENTED AND CAN DO SERIAL ADDITIONS
TACTILE DISTURBANCES: VERY MILD ITCHING, PINS AND NEEDLES, BURNING OR NUMBNESS
AGITATION: NORMAL ACTIVITY
ANXIETY: NO ANXIETY, AT EASE
HEADACHE, FULLNESS IN HEAD: NOT PRESENT
PAROXYSMAL SWEATS: NO SWEAT VISIBLE
AUDITORY DISTURBANCES: NOT PRESENT
AGITATION: NORMAL ACTIVITY
PAROXYSMAL SWEATS: NO SWEAT VISIBLE
AGITATION: NORMAL ACTIVITY
ORIENTATION AND CLOUDING OF SENSORIUM: ORIENTED AND CAN DO SERIAL ADDITIONS
ORIENTATION AND CLOUDING OF SENSORIUM: ORIENTED AND CAN DO SERIAL ADDITIONS
ANXIETY: NO ANXIETY, AT EASE
AGITATION: NORMAL ACTIVITY
AGITATION: NORMAL ACTIVITY
NAUSEA AND VOMITING: NO NAUSEA AND NO VOMITING
PAROXYSMAL SWEATS: NO SWEAT VISIBLE
TREMOR: NO TREMOR
AUDIT-C TOTAL SCORE: 2

## 2024-08-07 ASSESSMENT — ACTIVITIES OF DAILY LIVING (ADL)
BATHING_WHERE_ASSESSED: EDGE OF BED
LACK_OF_TRANSPORTATION: NO
BATHING: NEEDS ASSISTANCE
BATHING_LEVEL_OF_ASSISTANCE: SETUP;MINIMUM ASSISTANCE
BATHING_ASSISTANCE: MINIMAL
DRESSING YOURSELF: NEEDS ASSISTANCE
JUDGMENT_ADEQUATE_SAFELY_COMPLETE_DAILY_ACTIVITIES: YES
HEARING - LEFT EAR: FUNCTIONAL
WALKS IN HOME: NEEDS ASSISTANCE
ADL_ASSISTANCE: INDEPENDENT
GROOMING: NEEDS ASSISTANCE
ADEQUATE_TO_COMPLETE_ADL: YES
HEARING - RIGHT EAR: FUNCTIONAL
FEEDING YOURSELF: INDEPENDENT
HOME_MANAGEMENT_TIME_ENTRY: 28
ASSISTIVE_DEVICE: CANE;WALKER
PATIENT'S MEMORY ADEQUATE TO SAFELY COMPLETE DAILY ACTIVITIES?: YES
TOILETING: NEEDS ASSISTANCE

## 2024-08-07 ASSESSMENT — PAIN - FUNCTIONAL ASSESSMENT
PAIN_FUNCTIONAL_ASSESSMENT: 0-10

## 2024-08-07 ASSESSMENT — PAIN SCALES - GENERAL
PAINLEVEL_OUTOF10: 0 - NO PAIN
PAINLEVEL_OUTOF10: 0 - NO PAIN
PAINLEVEL_OUTOF10: 4
PAINLEVEL_OUTOF10: 0 - NO PAIN
PAINLEVEL_OUTOF10: 0 - NO PAIN
PAINLEVEL_OUTOF10: 3
PAINLEVEL_OUTOF10: 4
PAINLEVEL_OUTOF10: 3
PAINLEVEL_OUTOF10: 5 - MODERATE PAIN
PAINLEVEL_OUTOF10: 0 - NO PAIN

## 2024-08-07 ASSESSMENT — PATIENT HEALTH QUESTIONNAIRE - PHQ9
2. FEELING DOWN, DEPRESSED OR HOPELESS: NOT AT ALL
1. LITTLE INTEREST OR PLEASURE IN DOING THINGS: NOT AT ALL
SUM OF ALL RESPONSES TO PHQ9 QUESTIONS 1 & 2: 0

## 2024-08-07 ASSESSMENT — PAIN DESCRIPTION - LOCATION
LOCATION: GENERALIZED
LOCATION: GENERALIZED
LOCATION: HEAD

## 2024-08-07 NOTE — NURSING NOTE
Pt noted with ox sat of 88% while resting on room air. Pt placed on 2 L via nasal cannula currently sating @ 95%. Reparatory therapist Annel made  aware.

## 2024-08-07 NOTE — CARE PLAN
"The patient's goals for the shift include  to get some rest    The clinical goals for the shift include patient will tolerate CIWA this shift    Over the shift, the patient did make progress toward the following goals. Patient had an uneventful day. Continues on CIWA scale and tolerating well. Fluids infusing per orders. Patient is refusing to take heparin injection and states she \"no longer takes metformin\".     "

## 2024-08-07 NOTE — PROGRESS NOTES
Occupational Therapy    Evaluation/Treatment    Patient Name: Lenora Ames  MRN: [id number]  : 1955  Today's Date: 24  Time Calculation  Start Time: 859  Stop Time: 947 (5 minutes deducted due to interruption from other staff.)  Time Calculation (min): 48 min     Assessment:  OT Assessment: Pt. displays a mild decline from her prior level of functioning for self care skills & functional transfers due to her hospitalization for a fall with head injury. OT intervention is indicated to address the pt.'s deficit areas with safety, self care skills, balance & functional transfers to reduce the pt.'s risk for falls & to restore the pt. to her previous level of functioning.  Prognosis: Good  Barriers to Discharge:Appears to be family dynamic issues.  Evaluation/Treatment Tolerance: Patient tolerated treatment well  Medical Staff Made Aware: Yes  End of Session Communication: Bedside nurse  End of Session Patient Position: Up in chair, Alarm on  OT Assessment Results: Decreased ADL status, Decreased upper extremity strength, Decreased functional mobility, Decreased trunk control for functional activities, Decreased cognition  Prognosis: Good  Barriers to Discharge: Family dynamic issues.  Evaluation/Treatment Tolerance: Patient tolerated treatment well  Medical Staff Made Aware: Yes  Strengths: Housing layout.  Pt.s' son is with her & does not work.  Barriers to Participation: Comorbidities  Plan:  Treatment Interventions: ADL retraining, UE strengthening/ROM, Patient/family training, Equipment evaluation/education, Neuromuscular reeducation, Cognitive reorientation, Functional transfer training, Compensatory technique education  OT Frequency: 3 times per week  OT Discharge Recommendations: Low intensity level of continued care  OT Recommended Transfer Status: CGA  OT - OK to Discharge: Yes Based on completed evaluation and care plan recommendations, no barriers to discharge to next site of care     Treatment Interventions: ADL retraining, UE strengthening/ROM, Patient/family training, Equipment evaluation/education, Neuromuscular reeducation, Cognitive reorientation, Functional transfer training, Compensatory technique education    Subjective   Current Problem:  1. Fall, initial encounter        2. Injury of head, initial encounter        3. Alcohol use        4. History of stroke        5. Fall, subsequent encounter  Referral to Physical Therapy    Referral to Occupational Therapy    Referral to Speech Therapy        General:   OT Received On: 08/07/24  General  Reason for Referral: Impaired self care skills & functional transfers  Referred By: Elzbieta Donis CNP  Past Medical History Relevant to Rehab: esophagitis, CVA, L arm, vertigo  Family/Caregiver Present: Yes  Caregiver Feedback: Pt.'s son & daughter were present to offer feedback at this time.  Prior to Session Communication: Bedside nurse  Patient Position Received: Bed, 2 rail up, Alarm on  Preferred Learning Style: visual, verbal  General Comment: telemetry, external catheter  Precautions:  Medical Precautions: Fall precautions, Oxygen therapy device and L/min  Precautions Comment: safety precautions  Vital Signs:  Heart Rate: 103  Heart Rate Source: Monitor  SpO2: 95 %  BP Method: Automatic  Patient Position: Lying  Pain:  Pain Assessment  Pain Assessment: 0-10  0-10 (Numeric) Pain Score: 5 - Moderate pain  Pain Location: headache pain  Pain Interventions: Pt.'s nurse provided the pt. with pain medication.  Response to Interventions: No significant change in c/o pain    Objective   Cognition:  Overall Cognitive Status: Alert & cooperative at this time.  Orientation Level:  Oriented to self & place. Decreased to time.  Memory: Mild memory impairment & intermittent word finding difficulty.   Home Living:  Type of Home: House  Lives With: Her son.  Pt.'s son does not work.  Home Adaptive Equipment: rollator, FWW, cane  Home Layout: One  level  Home Access: Stairs to enter with rails  Entrance Stairs-Rails: 1 railing  Entrance Stairs-Number of Steps: 1  Bathroom Shower/Tub: Walk-in shower  Bathroom Toilet: Standard  Bathroom Equipment: shower chair & grab bar in the shower  Home Living Comments: Pt.'s daughter reported that the pt. has a sedentary lifestyle & often stays in bed.  Prior Function:  Level of [place]: Independent with ADLs and functional transfers  Receives Help From: Pt.'s son prepares the meals & does the laundry.  ADL Assistance: Independent  Homemaking Assistance: Needs assistance  Meal Prep: Pt.'s son prepares the meals.  Laundry: Pt.'s son does the laundry.  Ambulatory Assistance: Modified independent with a cane inside the house & use of  a rollator outside  Leisure: Pt. watches tv & likes to bird watch  Hand Dominance: Pt. reported L hand dominant since her CVA     ADL:  Eating Assistance: Modified independent   Grooming Assistance: Close SBA with set up  Bathing Assistance: Minimal  Bathing Deficit: Steadying, Set  up.Assistance for thoroughness with hygiene care of her feet.  UE Dressing Assistance: Minimal  LE Dressing Assistance: CGA  Toileting Assistance with Device: CGA  Activities of Daily Living:    Grooming  Grooming Level of Assistance: Close SBA with set up from a sitting position.  Grooming Where Assessed: Edge of bed    UE Bathing  UE Bathing Level of Assistance: Setup  UE Bathing Where Assessed: Edge of bed    LE Bathing  LE Bathing Level of Assistance: Setup, Minimum assistance  LE Bathing Where Assessed: Edge of bed  LE Bathing Comments: Hima & cues provided for thoroughness with hygiene care to her feet as well as CGA & cues for standing balance while performing perineal hygiene care.    UE Dressing  UE Dressing Level of Assistance: Minimum assistance, Setup  UE Dressing Where Assessed: Edge of bed  UE Dressing Comments: Hima to don a hospital gown    LE Dressing  LE Dressing: Yes  Sock Level of Assistance:  S set up  Adult Briefs Level of Assistance: CGA & cues when standing to manage the brief over her hips.)  LE Dressing Where Assessed: Edge of bed     Activity Tolerance:  Endurance: Endurance does not limit participation in activity     Bed Mobility/Transfers: Bed Mobility 1  Bed Mobility 1: Supine to sitting  Level of Assistance 1: Minimum assistance    Transfers  Transfer: Yes  Transfer 1  Technique 1: Stand pivot, Sit to stand, Stand to sit  Transfer Device 1: Walker, Gait belt  Transfer Level of Assistance 1: Contact guard    Sitting Balance:  Static Sitting Balance  Static Sitting-Level of Assistance: S static sitting balance on the side of the bed  Standing Balance:  Static Standing Balance  Static Standing-Level of Assistance: CGA & cues for standing balance during self care    Vision:Vision - Basic Assessment  Current Vision: Wears glasses only for reading     Strength:  Strength Comments: R UE is grossly G-/F+  L UE is grossly G- with the exception of L elbow  ext F+    Hand Function:  Hand Function  Gross Grasp: Grossly WFL B UE's  Coordination: right:fair/fair+, left WFL  Extremities:   RUE : Within Functional Limits and LUE   LUE: Within Functional Limits    Outcome Measures: [facility] Daily Activity  Putting on and taking off regular lower body clothing: A little  Bathing (including washing, rinsing, drying): A little  Putting on and taking off regular upper body clothing: A little  Toileting, which includes using toilet, bedpan or urinal: A little  Taking care of personal grooming such as brushing teeth: A little  Eating Meals: None  Daily Activity - Total Score: 19    Education Documentation  Precautions, taught by Nellie Villarreal OT at 8/7/2024 12:05 PM.  Learner: Patient  Readiness: Acceptance  Method: Explanation, Demonstration  Response: Needs Reinforcement, Demonstrated Understanding  Comment: OT POC.  Education on safety with transfers & safety with balance.    ADL Training, taught by Nellie MOYA  DAY Villarreal at 8/7/2024 12:05 PM.  Learner: Patient  Readiness: Acceptance  Method: Explanation, Demonstration  Response: Needs Reinforcement, Demonstrated Understanding  Comment: OT POC.  Education on safety with transfers & safety with balance.    Goals:  Encounter Problems       Encounter Problems (Active)       ADLs       Patient with complete upper body dressing with modified independence.       Start:  08/07/24    Expected End:  08/14/24            Patient will complete daily grooming tasks with modified independent.       Start:  08/07/24    Expected End:  08/14/24            Modified independent bathing with A/safety devices.       Start:  08/07/24    Expected End:  08/14/24            Modified independent LE dressing with A devices as indicated.       Start:  08/07/24    Expected End:  08/14/24            Modified independent toileting with A/safety devices.       Start:  08/07/24    Expected End:  08/14/24               BALANCE       Modified independent standing balance as needed for self care including ADL item retrieval.       Start:  08/07/24    Expected End:  08/14/24               MOBILITY       Modified independent bed mobility as needed to engage in ADL's & out of bed activity.        Start:  08/07/24    Expected End:  08/14/24               TRANSFERS       Modified independent & good safety with transfers to the bed, chair & toilet with A/safety devices.       Start:  08/07/24    Expected End:  08/14/24

## 2024-08-07 NOTE — CONSULTS
"Nutrition Initial Assessment:   Nutrition Assessment    Reason for Assessment: Admission nursing screening (MST=2)    Patient is a 69 y.o. female presenting with fall.   Principal Problem:    Fall at home, sequela    PMH:  T2DM, alcohol use disorder, CVA, HTN, stroke, depression, chronic pain, Esophagitis, Gastroenteritis, Other conditions influencing health status, Pneumonia (03/25/2023), Proteinuria, unspecified, and Yeast infection (03/25/2023).    Nutrition History:  Energy Intake: Poor < 50 %  Food and Nutrient History: Pt lying in room with family visiting. Reports she did not eat much breakfast because she was feeling nauseas. She sometimes has a hard time swallowing some foods. She is unsure which foods or when it happens, but she “can’t get the food down”. Usually she does not have any N/V/D/C. Pt reports that she has been eating well at home. She believes she has gained some weight over the last year because she is not as physically active as she use to be. She wanted to know how to eat a healthy diet. Provided information on a general healthy diet and ways to increase fiber into her diet.  Food Allergies/Intolerances:  None  GI Symptoms: Nausea  Oral Problems: Swallowing difficulty  Peripheral Vascular (WDL): Within Defined Limits.   Last BM Date: 08/06/24.   Wound Type: skin tear Back lateral;Right;Upper;Left (nursing/wound notes provide further details)    Anthropometrics:  Height: 162.5 cm (5' 3.98\")   Weight: 74.2 kg (163 lb 9.3 oz)   BMI (Calculated): 28.1  IBW/kg (Dietitian Calculated): 54.5 kg  Percent of IBW: 136 %       Weight History:   Wt Readings from Last 10 Encounters:   08/07/24 74.2 kg (163 lb 9.3 oz)   06/28/24 73.9 kg (163 lb)   03/20/24 76.7 kg (169 lb)   11/30/23 74.8 kg (165 lb)   11/24/23 74.8 kg (165 lb)   08/15/23 72.8 kg (160 lb 9.6 oz)   08/01/23 72.6 kg (160 lb)   07/25/23 72.6 kg (160 lb)   06/06/23 73.1 kg (161 lb 3.2 oz)   04/14/23 77 kg (169 lb 12.8 oz)     Weight Change " %:  Weight History / % Weight Change: wt history per chart: 3/20/24: 76.7 (3.3% loss in ~5 months).  Significant Weight Loss: No    Nutrition Focused Physical Exam Findings:  defer: Pt was about to eat lunch.   Subcutaneous Fat Loss:    defer  Muscle Wasting:   defer  Edema:   None per nursing flow sheet  Physical Findings:   See wound information above.    Nutrition Significant Labs:  BMP Trend:   Results from last 7 days   Lab Units 08/07/24  0215   GLUCOSE mg/dL 97   CALCIUM mg/dL 10.0   SODIUM mmol/L 138   POTASSIUM mmol/L 3.8   CO2 mmol/L 21   CHLORIDE mmol/L 102   BUN mg/dL 14   CREATININE mg/dL 0.72   A1C:  Lab Results   Component Value Date    HGBA1C 7.7 (A) 06/28/2024   BG POCT trend:   Results from last 7 days   Lab Units 08/07/24  1205 08/07/24  0514   POCT GLUCOSE mg/dL 152* 124*     Nutrition Specific Medications:  aspirin, 81 mg, oral, Daily  atorvastatin, 20 mg, oral, Nightly  escitalopram, 10 mg, oral, Daily  folic acid, 1 mg, oral, Daily  insulin glargine, 30 Units, subcutaneous, Nightly  insulin lispro, 0-20 Units, subcutaneous, Before meals & nightly  metFORMIN XR, 500 mg, oral, Daily with evening meal  multivitamin, 1 tablet, oral, Daily  oxybutynin, 5 mg, oral, TID  pantoprazole, 40 mg, oral, Daily before breakfast  sennosides-docusate sodium, 1 tablet, oral, BID  thiamine, 100 mg, oral, Daily    I/O:   Last BM Date: 08/06/24;      Dietary Orders (From admission, onward)       Start     Ordered    08/07/24 0502  Adult diet Regular  Diet effective now        Question:  Diet type  Answer:  Regular    08/07/24 0501                Estimated Needs:   Total Energy Estimated Needs (kCal): 1635 kCal (1634-1635kcal)  Method for Estimating Needs: 25-30kcal/kg of IBW (54.5kg)  Total Protein Estimated Needs (g): 65 g (55-65g/kg)  Method for Estimating Needs: 1-1.2g/kg of IBW  Total Fluid Estimated Needs (mL): 1635 mL  Method for Estimating Needs: 1ml/kcal        Nutrition Diagnosis   Malnutrition  Diagnosis  Patient has Malnutrition Diagnosis: No    Nutrition Diagnosis  Patient has Nutrition Diagnosis: Yes  Nutrition Diagnosis 1: Inadequate oral intake  Related to (1): symptoms of nausea  As Evidenced by (1): less than 50% of meals being consumed i the past 24 hours.       Nutrition Interventions/Recommendations         Nutrition Prescription:  Individualized Nutrition Prescription Provided for : diet, fluids        Nutrition Interventions:   Interventions: Meals and snacks  Meals and Snacks: General healthful diet  Goal: general healthful diet, if diet does increase in next 7 days, will suggest an ONS.    Collaboration and Referral of Nutrition Care: Collaboration by nutrition professional with other providers  Goal: pt reviewed at IDT rounds    Nutrition Education:   Education Documentation  Nutrition Care Manual, taught by Olivia Lebron RDN, CAROL ANN at 8/7/2024  3:36 PM.  Learner: Family, Patient  Readiness: Acceptance  Method: Explanation  Response: Verbalizes Understanding, Needs Reinforcement    Nutrition Monitoring and Evaluation   Food/Nutrient Related History Monitoring  Monitoring and Evaluation Plan: Amount of food  Amount of Food: Estimated amout of food  Criteria: pt will increase intake to at least 75% of est needs in the next 7 days.      Nutrition Focused Physical Findings  Monitoring and Evaluation Plan: Skin  Skin: Impaired wound healing  Criteria: Skin WNL  Other: Evaluate nutrition intervention as compared to nutrition goal(s) and estimated nutrient need criteria.       Follow Up  Time Spent (min): 75 minutes  Follow up: Provided inpatient RDN contact information  Last Date of Nutrition Visit: 08/07/24  Nutrition Follow-Up Needed?: Dietitian to reassess per policy  Follow up Comment: intake, Need for ONS, NFPE
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
## 2024-08-07 NOTE — CONSULTS
Patients name:  Room #  Do you have any home inhalers?      Do you get relief when using it?       Spacer education and have them teach back  If using home inhaler do you rinse your mouth?  Any barriers?  When were you diagnosed with COPD?  Any previous PFTs?   If so, when?   explain the importance of a PFT  Do you have a pulmonary Dr.?   Name:  Phone number:  Date of last appt:  Pulmonary cards given  Do you currently smoke or vape or have you ever?     Quit date or planning to quit?  How long have you smoked for?   PPD?    Smoking education given and class information given   Get orders for nicotine supplement  Do you have a Primary Dr.?  Name:  Phone number:  Date of last appt:  Do you have any home O2 or CPAP/BiPAP?    Settings for CPAP/BiPAP:  What company?                How much O2 at home?   Last time 6mwt was done?   Educate on acceptable O2 levels and the importance of monitoring O2 at home. Complete home O2 evaluation if needed.      COPD navigator inappropriate consult at this time. Patient needs  for home environment issues and ETOH issues.                          Brenda Herrera, RRT
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
## 2024-08-07 NOTE — NURSING NOTE
Pt noted with no changes in condition. Pt showing no s,s of pain,distress or discomfort. PT safety measures in place. Pt endorsed to oncoming nurse to continue to monitor.
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
## 2024-08-07 NOTE — ED TRIAGE NOTES
Fell several times at home and hit her head.  Attempted to walk to hospital from home with wheelchair. Patient was found kneeling on hospital grounds, screaming for help.

## 2024-08-07 NOTE — PROGRESS NOTES
Physical Therapy    Physical Therapy Evaluation    Patient Name: Lenora Ames  MRN: [id number]  Today's Date: 8/7/2024   Time Calculation  Start Time: 1021  Stop Time: 1038  Time Calculation (min): 17 min    Assessment/Plan   PT Assessment  PT Assessment Results: Decreased strength, Decreased endurance, Impaired balance, Decreased mobility  Rehab Prognosis: Good  Evaluation/Treatment Tolerance: Patient limited by fatigue  Medical Staff Made Aware: Yes  Strengths: Housing layout, Leisure activity  Barriers to Participation: Comorbidities  End of Session Communication: Bedside nurse  Assessment Comment: Pt is a 70 y/o female presenting with a decline from functional baseline s/p fall at home. Pt reports mild headache and increased dizziness/symptoms of light headed with positional changes. Pt states she has vertigo and utilizes breathing and visual strategies to assist with dizzy spells. Pt demonstrates decreased B LE strength, mobility, balance, impaired gait mechanics and decreased tolerance to activity. PT warranted at this time to address impairments so patient can progress towards PLOF and/or safe mobility.  End of Session Patient Position: Up in chair, Alarm on  IP OR SWING BED PT PLAN  Inpatient or Swing Bed: Inpatient  PT Plan  Treatment/Interventions: Bed mobility, Transfer training, Gait training, Balance training, Neuromuscular re-education, Strengthening, Therapeutic exercise, Therapeutic activity  PT Plan: Ongoing PT  PT Frequency: 3 times per week  PT Discharge Recommendations: Low intensity level of continued care  PT Recommended Transfer Status: Assist x1  PT - OK to Discharge: Yes  Based on completed evaluation and care plan recommendations, no barriers to discharge to next site of care       Subjective   General Visit Information:  General  Reason for Referral: impaired mobility and gait training  Referred By: Jewels MAZA  Past Medical History Relevant to Rehab: falls, neuropathy,prior  cva  Family/Caregiver Present: Yes  Caregiver Feedback: would like patient to go home; talked to daughter regarding HH  Prior to Session Communication: Bedside nurse  Patient Position Received: Up in chair, Alarm on  Preferred Learning Style: kinesthetic, verbal  General Comment: Pt seated in bedside chair upon arrival with daughter present. Pt on 2.5 L of O2, Purewick, masimo, tele all in place. Pt reports mild headache and fatigue. Agreeable to PT evaluation. Pleasant and cooperative. All needs in reach at conclusion of session.  Home Living:  Home Living  Type of Home: House  Lives With: Adult children  Home Adaptive Equipment: Cane, Walker rolling or standard (rollator)  Home Layout: One level  Home Access: No concerns  Home Living Comments: LIves with son whom she says is home all day with her. Uses cane in house and rollator outside of home.  Prior Level of Function:  Prior Function Per Pt/Caregiver Report  Level of [place]: Independent with ADLs and functional transfers, Independent with homemaking with ambulation (pt reports needing help out of her chair at home at times when see is feeling fatigued or tired.)  Receives Help From: Family  Precautions:  Precautions  Medical Precautions: Fall precautions  Vital Signs:  Vital Signs  Heart Rate: 104  Heart Rate Source: Monitor  SpO2: 96 %  BP: (!) 137/94  BP Location: Right arm  BP Method: Automatic  Patient Position: Sitting    Objective   Pain:  Pain Assessment  Pain Assessment: 0-10  0-10 (Numeric) Pain Score: 3  Pain Type: Acute pain  Pain Location: Head  Cognition:  Cognition  Orientation Level: Oriented X4    General Assessments:                       Strength  Strength Comments: R LE 4/5 L LE 4+/5  Static Sitting Balance  Static Sitting-Level of Assistance: Independent    Static Standing Balance  Static Standing-Balance Support: Bilateral upper extremity supported (FWW)  Static Standing-Level of Assistance: Contact guard  Dynamic Standing  Balance  Dynamic Standing-Comments: F  Functional Assessments:  Bed Mobility  Bed Mobility: Yes  Bed Mobility 1  Bed Mobility 1: Supine to sitting, Sitting to supine  Level of Assistance 1: Minimum assistance    Transfers  Transfer: Yes  Transfer 1  Technique 1: Sit to stand, Stand to sit  Transfer Device 1: Walker  Transfer Level of Assistance 1: Contact guard    Ambulation/Gait Training  Ambulation/Gait Training Performed: Yes  Ambulation/Gait Training 1  Device 1: Rolling walker  Gait Support Devices: Gait belt  Assistance 1: Contact guard  Quality of Gait 1: Inconsistent stride length, Decreased step length, Shuffling gait  Comments/Distance (ft) 1: 10' in room with fwd and back steps, pt reports mild dizziness upon standing but resolved ~45 sec , slow but safe pace with short bout of ambulation. No lob but unsteadiness present    Outcome Measures:  [facility] Basic Mobility  Turning from your back to your side while in a flat bed without using bedrails: None  Moving from lying on your back to sitting on the side of a flat bed without using bedrails: A little  Moving to and from bed to chair (including a wheelchair): A little  Standing up from a chair using your arms (e.g. wheelchair or bedside chair): A little  To walk in hospital room: A little  Climbing 3-5 steps with railing: A lot  Basic Mobility - Total Score: 18    Encounter Problems       Encounter Problems (Active)       Mobility       STG - Patient will ambulate       Start:  08/07/24    Expected End:  08/14/24       Pt will ambulate 100' x 2 with LRAD demonstrating improved gait mechanics and balance          bed mobility        Start:  08/07/24    Expected End:  08/14/24       Pt will perform bed mobility and supine to sit to supine with sba          transfer       Start:  08/07/24    Expected End:  08/14/24       Pt will transfer from sit to stand to sit and from bed to chair to bed with LRAD mod I         strength        Start:  08/07/24    Expected  End:  08/14/24       Pt will improve B LE strength by 1/2 grade for improved functional mobility             Safety       LTG - Patient will adhere to hip precautions during ADL's and transfers       Start:  08/07/24            LTG - Patient will demonstrate safety requirements appropriate to situation/environment       Start:  08/07/24            LTG - Patient will utilize safety techniques       Start:  08/07/24            STG - Patient locks brakes on wheelchair       Start:  08/07/24            STG - Patient uses call light consistently to request assistance with transfers       Start:  08/07/24            STG - Patient uses gait belt during all transfers       Start:  08/07/24            Goal 1       Start:  08/07/24            Goal 2       Start:  08/07/24            Goal 3       Start:  08/07/24                   Education Documentation  Mobility Training, taught by Myke Goldman PT at 8/7/2024 10:55 AM.  Learner: Family, Patient  Readiness: Acceptance  Method: Explanation, Demonstration  Response: Verbalizes Understanding, Demonstrated Understanding  Comment: Pt educated on importance of hand placment on chair during sit to stand transfer for safety. Also educated on dizziness symptom resolution strategies to decreased symptoms upon standing.    Education Comments  No comments found.

## 2024-08-07 NOTE — H&P
"History Of Present Illness  Lenora Ames is a 69 y.o. female presenting with fall. Pt states that she was trying to get into the bathroom at home when she grabbed the grab bars on the side and one broke off, hitting her in the head and making her fall down. Pt states that she asked her son to bring her to the ER but he wouldn't help her. Pt states her son was too drunk to drive her anywhere and he \"drinks too much\". Pts son states that she was drinking last night which she never usually does but he found 10 empty beer cans in her room. Pts ethanol level was 100 on admission, pt denies any etoh use. Pt states their electricity was off and her phone was dead so she walked to the ER in the middle of the night. She didn't make it all the way before calling out for help when nursing staff in the ED heard her and helped her into triage. Pts son and daughter have different stories. Pt expresses concern with her living situation and not feeling safe. Will have TCC interview patient.     ED VS: T36.4, , RR 19, /100, Sp02 93%RA    Imaging: CT C-spine- No acute intracranial abnormality.  No acute fracture or traumatic subluxation of the cervical spine.    -CT Chest: 1. No acute process identified in the chest.     Labs: Glu 97, Na 138, K 3.8, Bun/creat 14/0.72, Lactate 3.5, BNP 25, Trop 4, WBC 12.1, H/H 13.6/41.8, Plt 305, UA neg, tox screen + cannabis      Past Medical History  Past Medical History:   Diagnosis Date    Esophagitis     Gastroenteritis     Other conditions influencing health status     Disc herniation    Pneumonia 03/25/2023    Proteinuria, unspecified     Proteinuria    Yeast infection 03/25/2023       Surgical History  Past Surgical History:   Procedure Laterality Date    CT ANGIO NECK  8/24/2023    CT NECK ANGIO W AND WO IV CONTRAST 8/24/2023 [facility]    CT HEAD ANGIO W AND WO IV CONTRAST  8/24/2023    CT HEAD ANGIO W AND WO IV CONTRAST 8/24/2023 [facility]    MR HEAD ANGIO WO IV CONTRAST  " 03/11/2014    MR HEAD ANGIO WO IV CONTRAST 3/11/2014 [facility]    MR HEAD ANGIO WO IV CONTRAST  04/19/2021    MR HEAD ANGIO WO IV CONTRAST 4/19/2021 [facility]    MR HEAD ANGIO WO IV CONTRAST  03/06/2023    MR HEAD ANGIO WO IV CONTRAST [facility]    MR HEAD ANGIO WO IV CONTRAST  5/26/2023    MR HEAD ANGIO WO IV CONTRAST 5/26/2023 [facility]    MR NECK ANGIO WO IV CONTRAST  03/11/2014    MR NECK ANGIO WO IV CONTRAST 3/11/2014 [facility]    MR NECK ANGIO WO IV CONTRAST  04/19/2021    MR NECK ANGIO WO IV CONTRAST 4/19/2021 [facility]    MR NECK ANGIO WO IV CONTRAST  03/06/2023    MR NECK ANGIO WO IV CONTRAST [facility]    MR NECK ANGIO WO IV CONTRAST  5/26/2023    MR NECK ANGIO WO IV CONTRAST 5/26/2023 [facility]    OTHER SURGICAL HISTORY      excision of multiple cysts        Social History  She reports that she has been smoking cigarettes. She has never used smokeless tobacco. She reports current alcohol use of about 1.0 standard drink of alcohol per week. She reports current drug use. Drug: Marijuana.    Family History  Family History   Problem Relation Name Age of Onset    Aneurysm Mother      Lung cancer Father      Ovarian cancer Other          Allergies  Rofecoxib    Review of Systems   Constitutional:  Positive for activity change.   HENT: Negative.     Eyes: Negative.    Respiratory: Negative.     Cardiovascular: Negative.    Gastrointestinal: Negative.    Endocrine: Negative.    Genitourinary: Negative.    Musculoskeletal: Negative.    Skin:  Positive for wound.   Allergic/Immunologic: Negative.    Neurological: Negative.    Hematological: Negative.    Psychiatric/Behavioral: Negative.          Physical Exam  Vitals reviewed.   HENT:      Head: Normocephalic and atraumatic.      Right Ear: External ear normal.      Left Ear: External ear normal.      Nose: Nose normal.      Mouth/Throat:      Pharynx: Oropharynx is clear.   Eyes:      Conjunctiva/sclera: Conjunctivae normal.   Cardiovascular:       Rate and Rhythm: Normal rate and regular rhythm.      Pulses: Normal pulses.      Heart sounds: Normal heart sounds.   Pulmonary:      Breath sounds: Decreased breath sounds present.   Abdominal:      General: Bowel sounds are normal.      Palpations: Abdomen is soft.   Musculoskeletal:         General: Normal range of motion.      Cervical back: Normal range of motion and neck supple.   Skin:     General: Skin is dry.      Findings: Lesion and rash present.   Neurological:      Mental Status: She is alert and oriented to person, place, and time.   Psychiatric:         Mood and Affect: Mood normal.          Last Recorded Vitals  Blood pressure (!) 149/94, pulse 108, temperature 36.4 °C (97.6 °F), temperature source Temporal, resp. rate 19, SpO2 92%.    Relevant Results  Scheduled medications  aspirin, 81 mg, oral, Daily  atorvastatin, 20 mg, oral, Nightly  escitalopram, 10 mg, oral, Daily  folic acid, 1 mg, oral, Daily  heparin (porcine), 5,000 Units, subcutaneous, q8h  insulin glargine, 30 Units, subcutaneous, Nightly  insulin lispro, 0-20 Units, subcutaneous, Before meals & nightly  metFORMIN XR, 500 mg, oral, Daily with evening meal  multivitamin, 1 tablet, oral, Daily  oxybutynin, 5 mg, oral, TID  oxygen, , inhalation, Continuous - Inhalation  pantoprazole, 40 mg, oral, Daily before breakfast  sennosides-docusate sodium, 1 tablet, oral, BID  thiamine, 100 mg, oral, Daily      Continuous medications  sodium chloride 0.9%, 10 mL/hr  sodium chloride 0.9%, 75 mL/hr, Last Rate: 75 mL/hr (08/07/24 1002)      PRN medications  PRN medications: acetaminophen, acetaminophen, benzocaine-menthol, calcium carbonate, cyclobenzaprine, dextromethorphan-guaifenesin, dextrose, dextrose, dextrose, dextrose, glucagon, glucagon, guaiFENesin, hydrOXYzine HCL, LORazepam **OR** LORazepam **OR** LORazepam, meclizine, melatonin, midodrine, ondansetron **OR** ondansetron, sodium chloride 0.9%    Results for orders placed or performed  during the hospital encounter of 08/07/24 (from the past 24 hour(s))   ECG 12 lead   Result Value Ref Range    Ventricular Rate 94 BPM    Atrial Rate 94 BPM    ID Interval 124 ms    QRS Duration 84 ms    QT Interval 374 ms    QTC Calculation(Bazett) 467 ms    P Axis 44 degrees    R Axis -52 degrees    T Axis 34 degrees    QRS Count 15 beats    Q Onset 211 ms    P Onset 149 ms    P Offset 180 ms    T Offset 398 ms    QTC Fredericia 434 ms   CBC and Auto Differential   Result Value Ref Range    WBC 12.1 (H) 4.4 - 11.3 x10*3/uL    nRBC 0.0 0.0 - 0.0 /100 WBCs    RBC 4.84 4.00 - 5.20 x10*6/uL    Hemoglobin 13.6 12.0 - 16.0 g/dL    Hematocrit 41.8 36.0 - 46.0 %    MCV 86 80 - 100 fL    MCH 28.1 26.0 - 34.0 pg    MCHC 32.5 32.0 - 36.0 g/dL    RDW 13.9 11.5 - 14.5 %    Platelets 305 150 - 450 x10*3/uL    Neutrophils % 56.0 40.0 - 80.0 %    Immature Granulocytes %, Automated 0.4 0.0 - 0.9 %    Lymphocytes % 33.4 13.0 - 44.0 %    Monocytes % 7.3 2.0 - 10.0 %    Eosinophils % 2.0 0.0 - 6.0 %    Basophils % 0.9 0.0 - 2.0 %    Neutrophils Absolute 6.78 1.20 - 7.70 x10*3/uL    Immature Granulocytes Absolute, Automated 0.05 0.00 - 0.70 x10*3/uL    Lymphocytes Absolute 4.05 1.20 - 4.80 x10*3/uL    Monocytes Absolute 0.88 0.10 - 1.00 x10*3/uL    Eosinophils Absolute 0.24 0.00 - 0.70 x10*3/uL    Basophils Absolute 0.11 (H) 0.00 - 0.10 x10*3/uL   Comprehensive Metabolic Panel   Result Value Ref Range    Glucose 97 74 - 99 mg/dL    Sodium 138 136 - 145 mmol/L    Potassium 3.8 3.5 - 5.3 mmol/L    Chloride 102 98 - 107 mmol/L    Bicarbonate 21 21 - 32 mmol/L    Anion Gap 19 10 - 20 mmol/L    Urea Nitrogen 14 6 - 23 mg/dL    Creatinine 0.72 0.50 - 1.05 mg/dL    eGFR >90 >60 mL/min/1.73m*2    Calcium 10.0 8.6 - 10.3 mg/dL    Albumin 4.2 3.4 - 5.0 g/dL    Alkaline Phosphatase 80 33 - 136 U/L    Total Protein 7.4 6.4 - 8.2 g/dL    AST 20 9 - 39 U/L    Bilirubin, Total 0.3 0.0 - 1.2 mg/dL    ALT 13 7 - 45 U/L   Magnesium   Result Value Ref  Range    Magnesium 1.69 1.60 - 2.40 mg/dL   Troponin I, High Sensitivity, Initial   Result Value Ref Range    Troponin I, High Sensitivity 4 0 - 13 ng/L   Ethanol   Result Value Ref Range    Alcohol 100 (H) <=10 mg/dL   Lactate   Result Value Ref Range    Lactate 3.5 (H) 0.4 - 2.0 mmol/L   Protime-INR   Result Value Ref Range    Protime 10.8 9.8 - 12.8 seconds    INR 1.0 0.9 - 1.1   B-Type Natriuretic Peptide   Result Value Ref Range    BNP 25 0 - 99 pg/mL   Troponin, High Sensitivity, 1 Hour   Result Value Ref Range    Troponin I, High Sensitivity 4 0 - 13 ng/L   Lactate   Result Value Ref Range    Lactate 2.6 (H) 0.4 - 2.0 mmol/L   Urinalysis with Reflex Culture and Microscopic   Result Value Ref Range    Color, Urine Colorless (N) Light-Yellow, Yellow, Dark-Yellow    Appearance, Urine Clear Clear    Specific Gravity, Urine 1.003 (N) 1.005 - 1.035    pH, Urine 5.5 5.0, 5.5, 6.0, 6.5, 7.0, 7.5, 8.0    Protein, Urine NEGATIVE NEGATIVE, 10 (TRACE), 20 (TRACE) mg/dL    Glucose, Urine Normal Normal mg/dL    Blood, Urine NEGATIVE NEGATIVE    Ketones, Urine NEGATIVE NEGATIVE mg/dL    Bilirubin, Urine NEGATIVE NEGATIVE    Urobilinogen, Urine Normal Normal mg/dL    Nitrite, Urine NEGATIVE NEGATIVE    Leukocyte Esterase, Urine NEGATIVE NEGATIVE   Drug Screen, Urine   Result Value Ref Range    Amphetamine Screen, Urine Presumptive Negative Presumptive Negative    Barbiturate Screen, Urine Presumptive Negative Presumptive Negative    Benzodiazepines Screen, Urine Presumptive Negative Presumptive Negative    Cannabinoid Screen, Urine Presumptive Positive (A) Presumptive Negative    Cocaine Metabolite Screen, Urine Presumptive Negative Presumptive Negative    Fentanyl Screen, Urine Presumptive Negative Presumptive Negative    Opiate Screen, Urine Presumptive Negative Presumptive Negative    Oxycodone Screen, Urine Presumptive Negative Presumptive Negative    PCP Screen, Urine Presumptive Negative Presumptive Negative     Methadone Screen, Urine Presumptive Negative Presumptive Negative   POCT GLUCOSE   Result Value Ref Range    POCT Glucose 124 (H) 74 - 99 mg/dL        Assessment/Plan   Principal Problem:    Fall at home, sequela  Active Problems:    History of stroke    Alcohol use    Head injury    Fall      #Fall at home   #ETOH abuse   #Leukocytosis, likely reactive   #Lactic acidosis   -CT C-Spine: No acute intracranial abnormality. No acute fracture or traumatic subluxation of the cervical spine.  -CT Chest: 1. No acute process identified in the chest.   -CT head: No acute intracranial abnormality.  No acute fracture or traumatic subluxation of the cervical spine.  -UA neg   -Tox screen + cannabis   -Ethanol 100   -WBC 12.1  -Trop 4 > 4   -Lactate 3.5 > 2.6   -PT/OT evals   -TCC consult for safety  -Continue thiamine, MV, folic acid    -CIWA protocol     #DM Type II with neuropathy   #Hx of CVA   -Continue gabapentin, lantus, metformin, sitagliptin   -SSI with hypoglycemia protocol  -Monitor BG     #Essential HTN  #HLD  -Echo (3/23): 1. Left ventricular systolic function is normal with a 60-65% estimated ejection fraction.  2. Spectral Doppler shows an impaired relaxation pattern of left ventricular diastolic filling  -BP elevated overnight, will monitor  -Continue ASA, atorvastatin, midodrine with parameters    #Depression  -Continue escitalopram    #Chronic pain  -Continue cyclobenzaprine     #OAB  -Continuee oxybutynin     DVT ppx  -heparin subcutaneous     PUD ppx  -pantoprazole    F: PRN  E: Replete per protocol  N: Regular  A: PIV    Disposition: Pt requires more than 2 inpatient days at this time  Code Status: Full Code       SHAMAR Ervin-KARELY

## 2024-08-07 NOTE — DISCHARGE INSTR - DIET
The dietitian that visited with you was Olivia Lebron, MOISES, LD, CLC. If you have additional questions please feel free to reach out to her at:  Phone: [phone]   Email: [email or web address]

## 2024-08-08 VITALS
HEART RATE: 72 BPM | OXYGEN SATURATION: 95 % | SYSTOLIC BLOOD PRESSURE: 115 MMHG | TEMPERATURE: 97.9 F | HEIGHT: 64 IN | DIASTOLIC BLOOD PRESSURE: 74 MMHG | WEIGHT: 163.58 LBS | BODY MASS INDEX: 27.93 KG/M2 | RESPIRATION RATE: 16 BRPM

## 2024-08-08 LAB
ANION GAP SERPL CALC-SCNC: 9 MMOL/L (ref 10–20)
BUN SERPL-MCNC: 10 MG/DL (ref 6–23)
CALCIUM SERPL-MCNC: 8.8 MG/DL (ref 8.6–10.3)
CHLORIDE SERPL-SCNC: 108 MMOL/L (ref 98–107)
CO2 SERPL-SCNC: 27 MMOL/L (ref 21–32)
CREAT SERPL-MCNC: 0.57 MG/DL (ref 0.5–1.05)
EGFRCR SERPLBLD CKD-EPI 2021: >90 ML/MIN/1.73M*2
ERYTHROCYTE [DISTWIDTH] IN BLOOD BY AUTOMATED COUNT: 13.8 % (ref 11.5–14.5)
GLUCOSE BLD MANUAL STRIP-MCNC: 109 MG/DL (ref 74–99)
GLUCOSE SERPL-MCNC: 120 MG/DL (ref 74–99)
HCT VFR BLD AUTO: 38.7 % (ref 36–46)
HGB BLD-MCNC: 12.2 G/DL (ref 12–16)
MCH RBC QN AUTO: 27.9 PG (ref 26–34)
MCHC RBC AUTO-ENTMCNC: 31.5 G/DL (ref 32–36)
MCV RBC AUTO: 88 FL (ref 80–100)
NRBC BLD-RTO: 0 /100 WBCS (ref 0–0)
PLATELET # BLD AUTO: 233 X10*3/UL (ref 150–450)
POTASSIUM SERPL-SCNC: 3.9 MMOL/L (ref 3.5–5.3)
RBC # BLD AUTO: 4.38 X10*6/UL (ref 4–5.2)
SODIUM SERPL-SCNC: 140 MMOL/L (ref 136–145)
WBC # BLD AUTO: 7.8 X10*3/UL (ref 4.4–11.3)

## 2024-08-08 PROCEDURE — 2500000001 HC RX 250 WO HCPCS SELF ADMINISTERED DRUGS (ALT 637 FOR MEDICARE OP): Performed by: NURSE PRACTITIONER

## 2024-08-08 PROCEDURE — 97110 THERAPEUTIC EXERCISES: CPT | Mod: GP,CQ

## 2024-08-08 PROCEDURE — 82947 ASSAY GLUCOSE BLOOD QUANT: CPT

## 2024-08-08 PROCEDURE — 85027 COMPLETE CBC AUTOMATED: CPT | Performed by: NURSE PRACTITIONER

## 2024-08-08 PROCEDURE — 2500000002 HC RX 250 W HCPCS SELF ADMINISTERED DRUGS (ALT 637 FOR MEDICARE OP, ALT 636 FOR OP/ED)

## 2024-08-08 PROCEDURE — 94760 N-INVAS EAR/PLS OXIMETRY 1: CPT

## 2024-08-08 PROCEDURE — 2500000004 HC RX 250 GENERAL PHARMACY W/ HCPCS (ALT 636 FOR OP/ED)

## 2024-08-08 PROCEDURE — 80048 BASIC METABOLIC PNL TOTAL CA: CPT | Performed by: NURSE PRACTITIONER

## 2024-08-08 PROCEDURE — 2500000005 HC RX 250 GENERAL PHARMACY W/O HCPCS: Performed by: INTERNAL MEDICINE

## 2024-08-08 PROCEDURE — 2500000001 HC RX 250 WO HCPCS SELF ADMINISTERED DRUGS (ALT 637 FOR MEDICARE OP)

## 2024-08-08 PROCEDURE — 36415 COLL VENOUS BLD VENIPUNCTURE: CPT | Performed by: NURSE PRACTITIONER

## 2024-08-08 PROCEDURE — G0378 HOSPITAL OBSERVATION PER HR: HCPCS

## 2024-08-08 RX ORDER — SODIUM CHLORIDE 9 MG/ML
INJECTION, SOLUTION INTRAVENOUS
Status: COMPLETED
Start: 2024-08-08 | End: 2024-08-08

## 2024-08-08 ASSESSMENT — LIFESTYLE VARIABLES
AGITATION: NORMAL ACTIVITY
ORIENTATION AND CLOUDING OF SENSORIUM: ORIENTED AND CAN DO SERIAL ADDITIONS
NAUSEA AND VOMITING: NO NAUSEA AND NO VOMITING
HEADACHE, FULLNESS IN HEAD: NOT PRESENT
ANXIETY: NO ANXIETY, AT EASE
NAUSEA AND VOMITING: NO NAUSEA AND NO VOMITING
HEADACHE, FULLNESS IN HEAD: NOT PRESENT
ORIENTATION AND CLOUDING OF SENSORIUM: ORIENTED AND CAN DO SERIAL ADDITIONS
PULSE: 72
NAUSEA AND VOMITING: NO NAUSEA AND NO VOMITING
PAROXYSMAL SWEATS: NO SWEAT VISIBLE
TREMOR: NO TREMOR
TREMOR: NO TREMOR
ANXIETY: NO ANXIETY, AT EASE
AUDITORY DISTURBANCES: NOT PRESENT
TREMOR: NO TREMOR
VISUAL DISTURBANCES: NOT PRESENT
TOTAL SCORE: 0
VISUAL DISTURBANCES: NOT PRESENT
VISUAL DISTURBANCES: NOT PRESENT
NAUSEA AND VOMITING: NO NAUSEA AND NO VOMITING
AGITATION: NORMAL ACTIVITY
BLOOD PRESSURE: 115/74
TOTAL SCORE: 0
ORIENTATION AND CLOUDING OF SENSORIUM: ORIENTED AND CAN DO SERIAL ADDITIONS
AUDITORY DISTURBANCES: NOT PRESENT
BLOOD PRESSURE: 112/66
TOTAL SCORE: 0
PAROXYSMAL SWEATS: NO SWEAT VISIBLE
AUDITORY DISTURBANCES: NOT PRESENT
TREMOR: NO TREMOR
AGITATION: NORMAL ACTIVITY
AGITATION: NORMAL ACTIVITY
HEADACHE, FULLNESS IN HEAD: NOT PRESENT
PAROXYSMAL SWEATS: NO SWEAT VISIBLE
HEADACHE, FULLNESS IN HEAD: VERY MILD
VISUAL DISTURBANCES: NOT PRESENT
ORIENTATION AND CLOUDING OF SENSORIUM: ORIENTED AND CAN DO SERIAL ADDITIONS
TOTAL SCORE: 1
AUDITORY DISTURBANCES: NOT PRESENT
ANXIETY: NO ANXIETY, AT EASE
PAROXYSMAL SWEATS: NO SWEAT VISIBLE
ANXIETY: NO ANXIETY, AT EASE

## 2024-08-08 ASSESSMENT — PAIN DESCRIPTION - DESCRIPTORS: DESCRIPTORS: HEADACHE

## 2024-08-08 ASSESSMENT — COGNITIVE AND FUNCTIONAL STATUS - GENERAL
WALKING IN HOSPITAL ROOM: A LITTLE
CLIMB 3 TO 5 STEPS WITH RAILING: A LOT
TURNING FROM BACK TO SIDE WHILE IN FLAT BAD: A LITTLE
MOBILITY SCORE: 19
MOVING TO AND FROM BED TO CHAIR: A LITTLE

## 2024-08-08 ASSESSMENT — PAIN - FUNCTIONAL ASSESSMENT
PAIN_FUNCTIONAL_ASSESSMENT: 0-10
PAIN_FUNCTIONAL_ASSESSMENT: 0-10

## 2024-08-08 ASSESSMENT — PAIN SCALES - GENERAL
PAINLEVEL_OUTOF10: 3
PAINLEVEL_OUTOF10: 0 - NO PAIN

## 2024-08-08 NOTE — CARE PLAN
Problem: Skin  Goal: Decreased wound size/increased tissue granulation at next dressing change  Flowsheets (Taken 8/8/2024 0509)  Decreased wound size/increased tissue granulation at next dressing change: Promote sleep for wound healing  Goal: Participates in plan/prevention/treatment measures  Flowsheets (Taken 8/7/2024 1718 by Lilian Espinoza RN)  Participates in plan/prevention/treatment measures:   Elevate heels   Increase activity/out of bed for meals  Goal: Prevent/manage excess moisture  Flowsheets (Taken 8/8/2024 0509)  Prevent/manage excess moisture: Monitor for/manage infection if present  Goal: Prevent/minimize sheer/friction injuries  Outcome: Progressing  Flowsheets (Taken 8/8/2024 0509)  Prevent/minimize sheer/friction injuries:   Increase activity/out of bed for meals   Use pull sheet   Turn/reposition every 2 hours/use positioning/transfer devices  Goal: Promote/optimize nutrition  Outcome: Progressing  Flowsheets (Taken 8/8/2024 0509)  Promote/optimize nutrition: Monitor/record intake including meals     Problem: Fall/Injury  Goal: Be free from injury by end of the shift  Outcome: Progressing  Goal: Verbalize understanding of personal risk factors for fall in the hospital  Outcome: Progressing  Goal: Verbalize understanding of risk factor reduction measures to prevent injury from fall in the home  Outcome: Progressing  Goal: Pace activities to prevent fatigue by end of the shift  Outcome: Progressing     Problem: Discharge Planning  Goal: Discharge to home or other facility with appropriate resources  Outcome: Progressing   The patient's goals for the shift include      The clinical goals for the shift include Pt will have no falls this shift.    Over the shift, the patient did  make progress toward the following goals.

## 2024-08-08 NOTE — PROGRESS NOTES
Physical Therapy                 Therapy Communication Note    Patient Name: Lenora Ames  MRN: [id number]  Today's Date: 8/8/2024     Discipline: Physical Therapy    Missed Visit Reason: Missed Visit Reason: Other (Comment) (Attempted PT, patient politely declined at this time stating that she would prefer to wait until after breakfast. Will inform nurse and supervising therapist.)    Missed Time: Attempt 0820

## 2024-08-08 NOTE — CARE PLAN
The patient's goals for the shift include remain free from fall.    The clinical goals for the shift include Pt remains free of falls and is able to go home.

## 2024-08-08 NOTE — NURSING NOTE
Pt provided with discharge instructions and AVS. Pt verbalizes understanding. Pt stable at time of dispo, no distress observed. Pt wheeled to exit, home with family.

## 2024-08-08 NOTE — DISCHARGE SUMMARY
"Discharge Diagnosis  Fall at home, sequela    Issues Requiring Follow-Up  Follow up with PCP     Discharge Meds     Your medication list        CONTINUE taking these medications        Instructions Last Dose Given Next Dose Due   albuterol 90 mcg/actuation inhaler      Inhale 2 puffs every 6 hours if needed for wheezing.       aspirin 81 mg chewable tablet           atorvastatin 20 mg tablet  Commonly known as: Lipitor      TAKE ONE TABLET BY MOUTH AT BEDTIME       cyclobenzaprine 10 mg tablet  Commonly known as: Flexeril      Take 1 tablet (10 mg) by mouth 3 times a day.       escitalopram 10 mg tablet  Commonly known as: Lexapro      TAKE ONE TABLET BY MOUTH ONCE DAILY       HumaLOG KwikPen Insulin 100 unit/mL injection  Generic drug: insulin lispro      USE AS DIRECTED PER SLIDING SCALE UP TO 24 UNITS TOTAL PER DAY (8 BEFORE EACH MEAL).       hydrOXYzine HCL 25 mg tablet  Commonly known as: Atarax      Take 1 tablet (25 mg) by mouth as needed at bedtime for itching.       Lantus Solostar U-100 Insulin 100 unit/mL (3 mL) pen  Generic drug: insulin glargine      Inject 30 Units under the skin once daily at bedtime.       meclizine 25 mg tablet  Commonly known as: Antivert      Take 1 tablet (25 mg) by mouth every 8 hours if needed for dizziness.       melatonin 5 mg tablet           metFORMIN  mg 24 hr tablet  Commonly known as: Glucophage-XR      Take 1 tablet (500 mg) by mouth once daily in the evening. Take with meals. Do not crush, chew, or split.       midodrine 5 mg tablet  Commonly known as: Proamatine      TAKE ONE TABLET BY MOUTH THREE TIMES A DAY       pantoprazole 40 mg EC tablet  Commonly known as: ProtoNix      Take 1 tablet (40 mg) by mouth once daily in the morning. Take before meals. Do not crush, chew, or split.       pen needle 1/2\" 29G X 12mm needle  Commonly known as: Comfort EZ Pen Needles      Inject 3 each under the skin once daily. Use as instructed       tolterodine LA 4 mg 24 hr " "capsule  Commonly known as: Detrol LA      Take 1 capsule (4 mg) by mouth once daily. Do not crush, chew, or split.       True Metrix Glucose Test Strip strip  Generic drug: blood sugar diagnostic      USE AS INSTRUCTED TO CHECK BLOOD SUGARS 4 TIMES DAILY.              STOP taking these medications      gabapentin 100 mg capsule  Commonly known as: Neurontin        SITagliptin phosphate 50 mg tablet  Commonly known as: Januvia        sucralfate 1 gram tablet  Commonly known as: Carafate                 Test Results Pending At Discharge  Pending Labs       Order Current Status    Extra Urine Gray Tube Collected (08/07/24 0317)    Urinalysis with Reflex Culture and Microscopic In process            Hospital Course   Lenora Ames is a 69 y.o. female presenting with fall. Pt states that she was trying to get into the bathroom at home when she grabbed the grab bars on the side and one broke off, hitting her in the head and making her fall down. Pt states that she asked her son to bring her to the ER but he wouldn't help her. Pt states her son was too drunk to drive her anywhere and he \"drinks too much\". Pts son states that she was drinking last night which she never usually does but he found 10 empty beer cans in her room. Pts ethanol level was 100 on admission, pt denies any etoh use. Pt states their electricity was off and her phone was dead so she walked to the ER in the middle of the night. She didn't make it all the way before calling out for help when nursing staff in the ED heard her and helped her into triage. Pts son and daughter have different stories. Pt expresses concern with her living situation and not feeling safe. Will have TCC interview patient.     Hospital Course:  #Fall at home   #ETOH abuse   #Leukocytosis, likely reactive   #Lactic acidosis   -CT C-Spine: No acute intracranial abnormality. No acute fracture or traumatic subluxation of the cervical spine.  -CT Chest: 1. No acute process " identified in the chest.   -CT head: No acute intracranial abnormality.  No acute fracture or traumatic subluxation of the cervical spine.  -UA neg   -Tox screen + cannabis   -Ethanol 100   -WBC 12.1  -Trop 4 > 4   -Lactate 3.5 > 2.6   -PT/OT evals   -TCC consult for safety  -Continue thiamine, MV, folic acid    -CIWA protocol      #DM Type II with neuropathy   #Hx of CVA   -Continue gabapentin, lantus, metformin, sitagliptin   -SSI with hypoglycemia protocol  -Monitor BG      #Essential HTN  #HLD  -Echo (3/23): 1. Left ventricular systolic function is normal with a 60-65% estimated ejection fraction.  2. Spectral Doppler shows an impaired relaxation pattern of left ventricular diastolic filling  -BP elevated overnight, will monitor  -Continue ASA, atorvastatin, midodrine with parameters     #Depression  -Continue escitalopram     #Chronic pain  -Continue cyclobenzaprine      #OAB  -Continuee oxybutynin      DVT ppx  -heparin subcutaneous      PUD ppx  -pantoprazole     F: PRN  E: Replete per protocol  N: Regular  A: PIV     Disposition: Pt stable for discharge home. Gave outpatient rx for therapy per request.     Code Status: Full Code    Total cumulative time spent in preparation of this discharge including documentation review, coordination of care with the medical team including PT/SW/care coordinators and treating consultants, discussion with patient and pertinent family members and finalization of prescriptions, followup appointments and this discharge summary was approximately  45 minutes. Over 50% of the time was spent face-to-face counseling the patient on diagnosis, prescriptions, and follow up appointments.     Pertinent Physical Exam At Time of Discharge  Physical Exam  Vitals reviewed.   HENT:      Head: Normocephalic and atraumatic.      Right Ear: External ear normal.      Left Ear: External ear normal.      Nose: Nose normal.      Mouth/Throat:      Pharynx: Oropharynx is clear.   Eyes:       Conjunctiva/sclera: Conjunctivae normal.   Cardiovascular:      Rate and Rhythm: Normal rate and regular rhythm.      Pulses: Normal pulses.      Heart sounds: Normal heart sounds.   Pulmonary:      Breath sounds: Decreased breath sounds present.   Abdominal:      General: Bowel sounds are normal.      Palpations: Abdomen is soft.   Musculoskeletal:         General: Normal range of motion.      Cervical back: Normal range of motion and neck supple.   Skin:     General: Skin is dry.      Findings: Lesion and rash present.   Neurological:      Mental Status: She is alert and oriented to person, place, and time.   Psychiatric:         Mood and Affect: Mood normal.     Outpatient Follow-Up  Future Appointments   Date Time Provider Department Center   10/3/2024 10:20 AM Jordan Reed MD [id number] [facility]   10/29/2024 11:45 AM Faraz Ortega MD [id number] [facility]         SHAMAR Ervin-KARELY

## 2024-08-08 NOTE — PROGRESS NOTES
Physical Therapy    Physical Therapy Treatment    Patient Name: Lenora Ames  MRN: [id number]  Today's Date: 8/8/2024  Time Calculation  Start Time: 0928  Stop Time: 0947  Time Calculation (min): 19 min    Assessment/Plan   PT Assessment  PT Assessment Results: Decreased strength, Decreased endurance, Decreased mobility  Rehab Prognosis: Good  Barriers to Discharge: none noted  End of Session Communication: Bedside nurse  Assessment Comment: Pt is a 68 y/o female presenting with a decline from functional baseline s/p fall at home. Pt states she has vertigo and utilizes breathing and visual strategies to assist with dizzy spells. Pt demonstrates decreased B LE strength, mobility, balance, impaired gait mechanics and decreased tolerance to activity. PT warranted at this time to address impairments so patient can progress towards PLOF and/or safe mobility.  End of Session Patient Position: Bed, 2 rail up, Alarm on  PT Plan  Inpatient/Swing Bed or Outpatient: Inpatient  PT Plan  Treatment/Interventions: Bed mobility, Transfer training, Gait training, Therapeutic exercise  PT Plan: Ongoing PT  PT Frequency: 3 times per week  PT Discharge Recommendations: Low intensity level of continued care  PT Recommended Transfer Status: Assist x1  PT - OK to Discharge: Yes    General Visit Information:   PT  Visit  PT Received On: 08/08/24  General  Prior to Session Communication: Bedside nurse  Patient Position Received: Bed, 2 rail up, Alarm on    Subjective   Patient pleasant and agreeable to session.  Precautions:  Precautions  Medical Precautions: Infection precautions  Vital Signs:  Vital Signs  SpO2: 94 %    Objective   Pain:  Pain Assessment  Pain Assessment: 0-10  0-10 (Numeric) Pain Score: 0 - No pain  Cognition:  Cognition  Overall Cognitive Status: Within Functional Limits  Coordination:  Movements are Fluid and Coordinated: Yes     Treatments:  Therapeutic Exercise  Therapeutic Exercise Performed: Yes  Therapeutic  Exercise Activity 1: laqs x 15 with vcs required for eccentric control  Therapeutic Exercise Activity 2: hip flexion seated x 20  Therapeutic Exercise Activity 3: iso hip abduction/adduction x 15  Therapeutic Exercise Activity 4: toe/heel raises x 20    Bed Mobility  Bed Mobility: Yes  Bed Mobility 1  Bed Mobility 1: Sitting to supine  Level of Assistance 1: Modified independent (Light use of rail.)    Ambulation/Gait Training  Ambulation/Gait Training Performed: Yes  Ambulation/Gait Training 1  Surface 1: Level tile  Device 1: Rolling walker  Assistance 1: Contact guard, Minimal verbal cues (Vcs required to maintain erect posture.)  Quality of Gait 1: Decreased step length, Antalgic, Forward flexed posture  Comments/Distance (ft) 1: 15  Transfers  Transfer: Yes  Transfer 1  Technique 1: Sit to stand, Stand to sit  Transfer Device 1: Walker  Transfer Level of Assistance 1: Close supervision    Outcome Measures:  [facility] Basic Mobility  Turning from your back to your side while in a flat bed without using bedrails: None  Moving from lying on your back to sitting on the side of a flat bed without using bedrails: A little  Moving to and from bed to chair (including a wheelchair): A little  Standing up from a chair using your arms (e.g. wheelchair or bedside chair): None  To walk in hospital room: A little  Climbing 3-5 steps with railing: A lot  Basic Mobility - Total Score: 19    Education Documentation  Home Exercise Program, taught by Gail Espinoza PTA at 8/8/2024 10:31 AM.  Learner: Patient  Readiness: Acceptance  Method: Explanation, Demonstration  Response: Demonstrated Understanding  Comment: Educated patient on proper performance of LE strengthening exercises, able to demonstrate good understanding with return demonstration.    Education Comments  No comments found.      Encounter Problems       Encounter Problems (Active)       Mobility       STG - Patient will ambulate (Progressing)       Start:   08/07/24    Expected End:  08/14/24       Pt will ambulate 100' x 2 with LRAD demonstrating improved gait mechanics and balance          bed mobility  (Met)       Start:  08/07/24    Expected End:  08/14/24    Resolved:  08/08/24    Pt will perform bed mobility and supine to sit to supine with sba          transfer (Met)       Start:  08/07/24    Expected End:  08/14/24    Resolved:  08/08/24    Pt will transfer from sit to stand to sit and from bed to chair to bed with LRAD mod I         strength  (Progressing)       Start:  08/07/24    Expected End:  08/14/24       Pt will improve B LE strength by 1/2 grade for improved functional mobility             Safety       LTG - Patient will adhere to hip precautions during ADL's and transfers       Start:  08/07/24            LTG - Patient will demonstrate safety requirements appropriate to situation/environment       Start:  08/07/24            LTG - Patient will utilize safety techniques       Start:  08/07/24            STG - Patient locks brakes on wheelchair       Start:  08/07/24            STG - Patient uses call light consistently to request assistance with transfers       Start:  08/07/24            STG - Patient uses gait belt during all transfers       Start:  08/07/24            Goal 1       Start:  08/07/24            Goal 2       Start:  08/07/24            Goal 3       Start:  08/07/24

## 2024-08-08 NOTE — PROGRESS NOTES
08/08/24 0930   Discharge Planning   Home or Post Acute Services None   Type of Post Acute Facility Services Other (Comment)  (receiving outpatient therapy prior to admission)   Expected Discharge Disposition Home   Does the patient need discharge transport arranged? No     OBSERVATION: Patient medically ready for discharge.  Patient follow up information on discharge instructions.  Patient will be returning home and will resume her outpatient therapy. Patient is in agreement with discharge plan.     DC PLAN: Home

## 2024-08-09 LAB
ATRIAL RATE: 94 BPM
P AXIS: 44 DEGREES
P OFFSET: 180 MS
P ONSET: 149 MS
PR INTERVAL: 124 MS
Q ONSET: 211 MS
QRS COUNT: 15 BEATS
QRS DURATION: 84 MS
QT INTERVAL: 374 MS
QTC CALCULATION(BAZETT): 467 MS
QTC FREDERICIA: 434 MS
R AXIS: -52 DEGREES
T AXIS: 34 DEGREES
T OFFSET: 398 MS
VENTRICULAR RATE: 94 BPM

## 2024-08-12 ENCOUNTER — PATIENT OUTREACH (OUTPATIENT)
Dept: CARE COORDINATION | Facility: CLINIC | Age: 69
End: 2024-08-12
Payer: MEDICARE

## 2024-08-12 NOTE — PROGRESS NOTES
Discharge Facility: [facility]   Discharge Diagnosis: Fall at home   Admission Date: 8-7-24  Discharge Date: 8-8-24    PCP Appointment Date: Message routed to office for scheduling     Specialist Appointment Date:   10/3/2024 10:20 AM (20 min)  [facility]    UROLOGY [facility]     Hospital Encounter and Summary Linked: Yes    Two attempts were made to reach patient within two business days after discharge. Voicemail left with contact information for patient to call back with any non-emergent questions or concerns.    Allie Worthington LPN

## 2024-08-13 DIAGNOSIS — E78.00 HYPERCHOLESTEREMIA: ICD-10-CM

## 2024-08-14 RX ORDER — ATORVASTATIN CALCIUM 20 MG/1
20 TABLET, FILM COATED ORAL
Qty: 90 TABLET | Refills: 1 | Status: SHIPPED | OUTPATIENT
Start: 2024-08-14

## 2024-08-16 DIAGNOSIS — E11.649 UNCONTROLLED TYPE 2 DIABETES MELLITUS WITH HYPOGLYCEMIA, UNSPECIFIED HYPOGLYCEMIA COMA STATUS (MULTI): Primary | ICD-10-CM

## 2024-08-20 DIAGNOSIS — J45.20 INTERMITTENT ASTHMA, UNSPECIFIED ASTHMA SEVERITY, UNSPECIFIED WHETHER COMPLICATED (HHS-HCC): ICD-10-CM

## 2024-08-20 RX ORDER — ALBUTEROL SULFATE 90 UG/1
2 INHALANT RESPIRATORY (INHALATION) EVERY 6 HOURS PRN
Qty: 6.7 G | Refills: 0 | Status: SHIPPED | OUTPATIENT
Start: 2024-08-20

## 2024-08-29 ENCOUNTER — PATIENT OUTREACH (OUTPATIENT)
Dept: PRIMARY CARE | Facility: CLINIC | Age: 69
End: 2024-08-29
Payer: MEDICARE

## 2024-08-29 DIAGNOSIS — R42 DIZZINESS: ICD-10-CM

## 2024-08-29 DIAGNOSIS — F33.9 EPISODE OF RECURRENT MAJOR DEPRESSIVE DISORDER, UNSPECIFIED DEPRESSION EPISODE SEVERITY (CMS-HCC): ICD-10-CM

## 2024-08-29 DIAGNOSIS — J45.20 INTERMITTENT ASTHMA, UNSPECIFIED ASTHMA SEVERITY, UNSPECIFIED WHETHER COMPLICATED (HHS-HCC): ICD-10-CM

## 2024-08-29 RX ORDER — MECLIZINE HYDROCHLORIDE 25 MG/1
TABLET ORAL
Qty: 90 TABLET | Refills: 0 | Status: SHIPPED | OUTPATIENT
Start: 2024-08-29

## 2024-08-29 RX ORDER — ESCITALOPRAM OXALATE 10 MG/1
10 TABLET ORAL DAILY
Qty: 90 TABLET | Refills: 0 | Status: SHIPPED | OUTPATIENT
Start: 2024-08-29

## 2024-08-29 RX ORDER — ALBUTEROL SULFATE 90 UG/1
2 INHALANT RESPIRATORY (INHALATION) EVERY 6 HOURS PRN
Qty: 6.7 G | Refills: 0 | Status: SHIPPED | OUTPATIENT
Start: 2024-08-29

## 2024-09-16 ENCOUNTER — PATIENT OUTREACH (OUTPATIENT)
Dept: PRIMARY CARE | Facility: CLINIC | Age: 69
End: 2024-09-16
Payer: MEDICARE

## 2024-09-16 NOTE — PROGRESS NOTES
Unable to reach patient for discharge follow up call.   LVM with call back number for patient to call if needed   If no voicemail available call attempts x 2 were made to contact the patient to assist with any questions or concerns patient may have.  Allie Worthington LPN

## 2024-09-17 DIAGNOSIS — M75.101 ROTATOR CUFF SYNDROME OF RIGHT SHOULDER: ICD-10-CM

## 2024-09-17 RX ORDER — CYCLOBENZAPRINE HCL 10 MG
10 TABLET ORAL 3 TIMES DAILY
Qty: 90 TABLET | Refills: 0 | Status: SHIPPED | OUTPATIENT
Start: 2024-09-17

## 2024-09-19 DIAGNOSIS — L29.9 ITCHING: ICD-10-CM

## 2024-09-19 DIAGNOSIS — B02.29 POST HERPETIC NEURALGIA: ICD-10-CM

## 2024-09-20 RX ORDER — HYDROXYZINE HYDROCHLORIDE 25 MG/1
TABLET, FILM COATED ORAL
Qty: 30 TABLET | Refills: 0 | Status: SHIPPED | OUTPATIENT
Start: 2024-09-20

## 2024-09-23 DIAGNOSIS — R35.0 URINARY FREQUENCY: ICD-10-CM

## 2024-09-23 RX ORDER — TOLTERODINE 4 MG/1
CAPSULE, EXTENDED RELEASE ORAL
Qty: 90 CAPSULE | Refills: 0 | Status: SHIPPED | OUTPATIENT
Start: 2024-09-23

## 2024-09-26 DIAGNOSIS — B02.29 POST HERPETIC NEURALGIA: ICD-10-CM

## 2024-09-26 DIAGNOSIS — L29.9 ITCHING: ICD-10-CM

## 2024-09-26 RX ORDER — HYDROXYZINE HYDROCHLORIDE 25 MG/1
TABLET, FILM COATED ORAL
Qty: 30 TABLET | Refills: 0 | Status: SHIPPED | OUTPATIENT
Start: 2024-09-26

## 2024-10-03 ENCOUNTER — APPOINTMENT (OUTPATIENT)
Dept: UROLOGY | Facility: CLINIC | Age: 69
End: 2024-10-03
Payer: MEDICARE

## 2024-10-13 ENCOUNTER — APPOINTMENT (OUTPATIENT)
Dept: CARDIOLOGY | Facility: HOSPITAL | Age: 69
End: 2024-10-13
Payer: MEDICARE

## 2024-10-13 ENCOUNTER — APPOINTMENT (OUTPATIENT)
Dept: RADIOLOGY | Facility: HOSPITAL | Age: 69
End: 2024-10-13
Payer: MEDICARE

## 2024-10-13 ENCOUNTER — HOSPITAL ENCOUNTER (EMERGENCY)
Facility: HOSPITAL | Age: 69
Discharge: HOME | End: 2024-10-14
Attending: EMERGENCY MEDICINE
Payer: MEDICARE

## 2024-10-13 DIAGNOSIS — L02.91 ABSCESS: ICD-10-CM

## 2024-10-13 DIAGNOSIS — S09.90XA CLOSED HEAD INJURY, INITIAL ENCOUNTER: Primary | ICD-10-CM
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
## 2024-10-13 DIAGNOSIS — R58 ECCHYMOSIS: ICD-10-CM

## 2024-10-13 LAB
APPEARANCE UR: CLEAR
BASOPHILS # BLD AUTO: 0.07 X10*3/UL (ref 0–0.1)
BASOPHILS NFR BLD AUTO: 0.7 %
BILIRUB UR STRIP.AUTO-MCNC: NEGATIVE MG/DL
COLOR UR: NORMAL
EOSINOPHIL # BLD AUTO: 0.24 X10*3/UL (ref 0–0.7)
EOSINOPHIL NFR BLD AUTO: 2.5 %
ERYTHROCYTE [DISTWIDTH] IN BLOOD BY AUTOMATED COUNT: 13.8 % (ref 11.5–14.5)
GLUCOSE UR STRIP.AUTO-MCNC: NORMAL MG/DL
HCT VFR BLD AUTO: 46.4 % (ref 36–46)
HGB BLD-MCNC: 14.9 G/DL (ref 12–16)
IMM GRANULOCYTES # BLD AUTO: 0.02 X10*3/UL (ref 0–0.7)
IMM GRANULOCYTES NFR BLD AUTO: 0.2 % (ref 0–0.9)
KETONES UR STRIP.AUTO-MCNC: NEGATIVE MG/DL
LEUKOCYTE ESTERASE UR QL STRIP.AUTO: NEGATIVE
LYMPHOCYTES # BLD AUTO: 3.19 X10*3/UL (ref 1.2–4.8)
LYMPHOCYTES NFR BLD AUTO: 32.8 %
MCH RBC QN AUTO: 29.3 PG (ref 26–34)
MCHC RBC AUTO-ENTMCNC: 32.1 G/DL (ref 32–36)
MCV RBC AUTO: 91 FL (ref 80–100)
MONOCYTES # BLD AUTO: 0.75 X10*3/UL (ref 0.1–1)
MONOCYTES NFR BLD AUTO: 7.7 %
NEUTROPHILS # BLD AUTO: 5.47 X10*3/UL (ref 1.2–7.7)
NEUTROPHILS NFR BLD AUTO: 56.1 %
NITRITE UR QL STRIP.AUTO: NEGATIVE
NRBC BLD-RTO: 0 /100 WBCS (ref 0–0)
PH UR STRIP.AUTO: 6.5 [PH]
PLATELET # BLD AUTO: 244 X10*3/UL (ref 150–450)
PROT UR STRIP.AUTO-MCNC: NEGATIVE MG/DL
RBC # BLD AUTO: 5.09 X10*6/UL (ref 4–5.2)
RBC # UR STRIP.AUTO: NEGATIVE /UL
SP GR UR STRIP.AUTO: 1.01
UROBILINOGEN UR STRIP.AUTO-MCNC: NORMAL MG/DL
WBC # BLD AUTO: 9.7 X10*3/UL (ref 4.4–11.3)

## 2024-10-13 PROCEDURE — 72125 CT NECK SPINE W/O DYE: CPT

## 2024-10-13 PROCEDURE — 70450 CT HEAD/BRAIN W/O DYE: CPT

## 2024-10-13 PROCEDURE — 93005 ELECTROCARDIOGRAM TRACING: CPT

## 2024-10-13 PROCEDURE — 85025 COMPLETE CBC W/AUTO DIFF WBC: CPT | Performed by: EMERGENCY MEDICINE

## 2024-10-13 PROCEDURE — 84075 ASSAY ALKALINE PHOSPHATASE: CPT | Performed by: EMERGENCY MEDICINE

## 2024-10-13 PROCEDURE — 83690 ASSAY OF LIPASE: CPT | Performed by: EMERGENCY MEDICINE

## 2024-10-13 PROCEDURE — 84484 ASSAY OF TROPONIN QUANT: CPT | Performed by: EMERGENCY MEDICINE

## 2024-10-13 PROCEDURE — 74177 CT ABD & PELVIS W/CONTRAST: CPT

## 2024-10-13 PROCEDURE — 36415 COLL VENOUS BLD VENIPUNCTURE: CPT | Performed by: EMERGENCY MEDICINE

## 2024-10-13 PROCEDURE — 81003 URINALYSIS AUTO W/O SCOPE: CPT | Performed by: EMERGENCY MEDICINE

## 2024-10-13 PROCEDURE — 99285 EMERGENCY DEPT VISIT HI MDM: CPT | Mod: 25

## 2024-10-13 ASSESSMENT — PAIN SCALES - GENERAL
PAINLEVEL_OUTOF10: 0 - NO PAIN
PAINLEVEL_OUTOF10: 5 - MODERATE PAIN
PAINLEVEL_OUTOF10: 0 - NO PAIN

## 2024-10-13 ASSESSMENT — COLUMBIA-SUICIDE SEVERITY RATING SCALE - C-SSRS
1. IN THE PAST MONTH, HAVE YOU WISHED YOU WERE DEAD OR WISHED YOU COULD GO TO SLEEP AND NOT WAKE UP?: NO
2. HAVE YOU ACTUALLY HAD ANY THOUGHTS OF KILLING YOURSELF?: NO
6. HAVE YOU EVER DONE ANYTHING, STARTED TO DO ANYTHING, OR PREPARED TO DO ANYTHING TO END YOUR LIFE?: NO

## 2024-10-13 ASSESSMENT — PAIN DESCRIPTION - LOCATION: LOCATION: HEAD

## 2024-10-13 ASSESSMENT — PAIN - FUNCTIONAL ASSESSMENT: PAIN_FUNCTIONAL_ASSESSMENT: 0-10

## 2024-10-13 ASSESSMENT — PAIN DESCRIPTION - PAIN TYPE: TYPE: ACUTE PAIN

## 2024-10-14 VITALS
DIASTOLIC BLOOD PRESSURE: 84 MMHG | WEIGHT: 160 LBS | TEMPERATURE: 97.1 F | RESPIRATION RATE: 18 BRPM | OXYGEN SATURATION: 96 % | HEIGHT: 64 IN | SYSTOLIC BLOOD PRESSURE: 116 MMHG | BODY MASS INDEX: 27.31 KG/M2 | HEART RATE: 82 BPM

## 2024-10-14 LAB
ALBUMIN SERPL BCP-MCNC: 4.3 G/DL (ref 3.4–5)
ALP SERPL-CCNC: 81 U/L (ref 33–136)
ALT SERPL W P-5'-P-CCNC: 13 U/L (ref 7–45)
ANION GAP SERPL CALC-SCNC: 8 MMOL/L (ref 10–20)
AST SERPL W P-5'-P-CCNC: 15 U/L (ref 9–39)
BILIRUB SERPL-MCNC: 0.3 MG/DL (ref 0–1.2)
BUN SERPL-MCNC: 18 MG/DL (ref 6–23)
CALCIUM SERPL-MCNC: 10.3 MG/DL (ref 8.6–10.3)
CARDIAC TROPONIN I PNL SERPL HS: 3 NG/L (ref 0–13)
CHLORIDE SERPL-SCNC: 104 MMOL/L (ref 98–107)
CO2 SERPL-SCNC: 30 MMOL/L (ref 21–32)
CREAT SERPL-MCNC: 0.57 MG/DL (ref 0.5–1.05)
EGFRCR SERPLBLD CKD-EPI 2021: >90 ML/MIN/1.73M*2
GLUCOSE SERPL-MCNC: 112 MG/DL (ref 74–99)
LIPASE SERPL-CCNC: 42 U/L (ref 9–82)
POTASSIUM SERPL-SCNC: 4.2 MMOL/L (ref 3.5–5.3)
PROT SERPL-MCNC: 7.6 G/DL (ref 6.4–8.2)
SODIUM SERPL-SCNC: 138 MMOL/L (ref 136–145)

## 2024-10-14 PROCEDURE — 2550000001 HC RX 255 CONTRASTS: Performed by: EMERGENCY MEDICINE

## 2024-10-14 PROCEDURE — 70450 CT HEAD/BRAIN W/O DYE: CPT | Performed by: RADIOLOGY

## 2024-10-14 PROCEDURE — 74177 CT ABD & PELVIS W/CONTRAST: CPT | Performed by: RADIOLOGY

## 2024-10-14 PROCEDURE — 71260 CT THORAX DX C+: CPT | Performed by: RADIOLOGY

## 2024-10-14 PROCEDURE — 72125 CT NECK SPINE W/O DYE: CPT | Performed by: RADIOLOGY

## 2024-10-14 PROCEDURE — 2500000001 HC RX 250 WO HCPCS SELF ADMINISTERED DRUGS (ALT 637 FOR MEDICARE OP)

## 2024-10-14 RX ORDER — DOXYCYCLINE 100 MG/1
100 TABLET ORAL 2 TIMES DAILY
Qty: 14 TABLET | Refills: 0 | Status: SHIPPED | OUTPATIENT
Start: 2024-10-14 | End: 2024-10-21

## 2024-10-14 RX ORDER — LORAZEPAM 0.5 MG/1
TABLET ORAL
Status: COMPLETED
Start: 2024-10-14 | End: 2024-10-14

## 2024-10-14 RX ORDER — LORAZEPAM 0.5 MG/1
1 TABLET ORAL ONCE
Status: COMPLETED | OUTPATIENT
Start: 2024-10-14 | End: 2024-10-14

## 2024-10-14 ASSESSMENT — PAIN SCALES - GENERAL: PAINLEVEL_OUTOF10: 0 - NO PAIN

## 2024-10-14 NOTE — ED TRIAGE NOTES
Pt arrives to [facility] via wheelchair, presenting following a mechanical fall and syncopal episode. Pt states she was letting her dog outside, the dog took off running and pulled her down. Pt states she had a syncopal episode after the fall, hitting her head. Pt unsure how long she was down for but called her daughter when she awoke. Pt denies any CP, SOB, N/V/D, fever and/or chills. Pt reports mild head and neck pain. Pt denies any blood thinners. Pt A&Ox4, resp easy and unlabored, skin of appropriate color.

## 2024-10-14 NOTE — ED PROVIDER NOTES
HPI   Chief Complaint   Patient presents with    Syncope    Fall    Head Injury       HPI  Patient is a 69-year-old female presenting to the ED today for evaluation of a fall with associated loss of consciousness.  Patient explains that she was letting her dog outside when her dog took off running and pulled her down.  She describes falling forward and hitting her head on the grass.  She believes that she lost consciousness for several minutes after the fall.  When she woke up, she was able to call her daughter for help.  She currently complains of pain to her head and neck.  She also has bruising to her abdomen.  She otherwise denies any chest pain or shortness of breath.  She has no additional concerns.  Patient is not on any anticoagulation.  Patient denies any focal numbness, weakness, or tingling, slurred speech, facial droop, confusion.      Patient History   Past Medical History:   Diagnosis Date    COPD (chronic obstructive pulmonary disease) (Multi)     Diabetes mellitus (Multi)     Esophagitis     Gastroenteritis     Other conditions influencing health status     Disc herniation    Pneumonia 03/25/2023    Proteinuria, unspecified     Proteinuria    Stroke (Multi)     Yeast infection 03/25/2023     Past Surgical History:   Procedure Laterality Date    CT ANGIO NECK  8/24/2023    CT NECK ANGIO W AND WO IV CONTRAST 8/24/2023 [facility]    CT HEAD ANGIO W AND WO IV CONTRAST  8/24/2023    CT HEAD ANGIO W AND WO IV CONTRAST 8/24/2023 [facility]    MR HEAD ANGIO WO IV CONTRAST  03/11/2014    MR HEAD ANGIO WO IV CONTRAST 3/11/2014 [facility]    MR HEAD ANGIO WO IV CONTRAST  04/19/2021    MR HEAD ANGIO WO IV CONTRAST 4/19/2021 [facility]    MR HEAD ANGIO WO IV CONTRAST  03/06/2023    MR HEAD ANGIO WO IV CONTRAST [facility]    MR HEAD ANGIO WO IV CONTRAST  5/26/2023    MR HEAD ANGIO WO IV CONTRAST 5/26/2023 [facility]    MR NECK ANGIO WO IV CONTRAST  03/11/2014    MR NECK ANGIO WO IV CONTRAST 3/11/2014 [facility]  LEGAC[facility]    MR NECK ANGIO WO IV CONTRAST  04/19/2021    MR NECK ANGIO WO IV CONTRAST 4/19/2021 [facility]    MR NECK ANGIO WO IV CONTRAST  03/06/2023    MR NECK ANGIO WO IV CONTRAST [facility]    MR NECK ANGIO WO IV CONTRAST  5/26/2023    MR NECK ANGIO WO IV CONTRAST 5/26/2023 [facility]    OTHER SURGICAL HISTORY      excision of multiple cysts     Family History   Problem Relation Name Age of Onset    Aneurysm Mother      Lung cancer Father      Ovarian cancer Other       Social History     Tobacco Use    Smoking status: Every Day     Current packs/day: 0.50     Types: Cigarettes    Smokeless tobacco: Never   Vaping Use    Vaping status: Never Used   Substance Use Topics    Alcohol use: Yes     Alcohol/week: 1.0 standard drink of alcohol     Types: 1 Cans of beer per week     Comment: occasional    Drug use: Yes     Types: Marijuana     Comment: daily use       Physical Exam   ED Triage Vitals   Temperature Heart Rate Respirations BP   10/13/24 2239 10/13/24 2239 10/13/24 2239 10/13/24 2239   36.2 °C (97.1 °F) 85 18 143/75      Pulse Ox Temp Source Heart Rate Source Patient Position   10/13/24 2239 10/13/24 2239 10/13/24 2239 10/13/24 2350   94 % Temporal Monitor Sitting      BP Location FiO2 (%)     10/13/24 2350 --     Left arm        Physical Exam  Vitals and nursing note reviewed.   Constitutional:       General: She is not in acute distress.     Appearance: She is not toxic-appearing.   HENT:      Head: Normocephalic and atraumatic.      Comments: Small area of swelling to the base of her left scalp consistent with abscess, actively draining.  Patient noted that this has been ongoing for about 1 week.  Her PCP told her to go to the urgent care for evaluation as she was unable to get into her PCPs office for another month or so.     Mouth/Throat:      Mouth: Mucous membranes are moist.   Eyes:      Extraocular Movements: Extraocular movements intact.      Conjunctiva/sclera: Conjunctivae normal.    Cardiovascular:      Rate and Rhythm: Normal rate and regular rhythm.      Pulses: Normal pulses.   Pulmonary:      Effort: Pulmonary effort is normal. No respiratory distress.      Breath sounds: Normal breath sounds. No wheezing.   Abdominal:      General: There is no distension.      Palpations: Abdomen is soft.      Comments: Small area of ecchymosis over the mid abdomen where there is some tenderness to palpation   Musculoskeletal:         General: No swelling.      Cervical back: Neck supple.   Skin:     General: Skin is warm and dry.      Capillary Refill: Capillary refill takes less than 2 seconds.   Neurological:      General: No focal deficit present.      Mental Status: She is alert. Mental status is at baseline.      Comments: This patient is awake, alert and oriented to person, place and time. Speech is clear and fluent. Cranial nerves II-XII are grossly intact. Strength and sensation are intact in all extremities.            ED Course & MDM   ED Course as of 10/14/24 0248   Sun Oct 13, 2024   2355 EKG obtained at 2238, interpreted by myself.  Normal sinus rhythm with a ventricular rate of 82, left axis deviation, normal intervals, with no acute ischemic changes [VT]      ED Course User Index  [VT] Flori NUNO MD         Diagnoses as of 10/14/24 0248   Closed head injury, initial encounter   Fall, initial encounter   Ecchymosis   Abscess             No data recorded     Jacob Coma Scale Score: 15 (10/13/24 2355 : Mary Wild, LATHA)                         Medical Decision Making  Patient was seen and evaluated for a mechanical fall with associated loss of consciousness.  Patient is also requesting evaluation of a small mass on the back of her head that she has had for about 1 week.  Patient's mass is consistent with an abscess currently actively draining.  On arrival, vital signs are unremarkable.  Additional lab work and imaging are ordered for evaluation of acute traumatic injuries.    CBC is  unremarkable.  CMP is unremarkable.  Lipase is normal at 42.  High-sensitivity troponin is normal at 3.  Urinalysis is not indicative of infection.  CT chest abdomen pelvis w IV contrast   Final Result   No acute, posttraumatic abnormality of the chest, abdomen and pelvis.        Mild lower lobe bronchial mucous plugging which may be infectious or   inflammatory.        Possible enteritis and colonic constipation. Additional findings as   detailed.        MACRO:   None        Signed by: Marisa Funk 10/14/2024 2:32 AM   Dictation workstation:   [id number]      CT head wo IV contrast   Final Result   No acute intracranial hemorrhage or mass effect.        Chronic parenchymal changes similar to prior imaging.        No acute fracture or traumatic subluxation of the cervical spine.        Postsurgical and chronic changes of the cervicothoracic spine similar   to prior imaging. No critical canal stenosis.        MACRO:   None.        Signed by: Marisa Funk 10/14/2024 2:20 AM   Dictation workstation:   [id number]      CT cervical spine wo IV contrast   Final Result   No acute intracranial hemorrhage or mass effect.        Chronic parenchymal changes similar to prior imaging.        No acute fracture or traumatic subluxation of the cervical spine.        Postsurgical and chronic changes of the cervicothoracic spine similar   to prior imaging. No critical canal stenosis.        MACRO:   None.        Signed by: Marisa Funk 10/14/2024 2:20 AM   Dictation workstation:   [id number]        On reevaluation, patient is resting comfortably in bed. Patient was informed of their lab and imaging results, and all questions and concerns were answered.  Patient will be prescribed a course of doxycycline for her abscess.  Discharge planning with close outpatient follow-up was discussed at this time, to which the patient was agreeable. Strict return precautions were given, and patient was discharged home in stable  condition.    Procedure  Procedures     Flori NUNO MD  10/14/24 9817

## 2024-10-15 DIAGNOSIS — E11.649 UNCONTROLLED TYPE 2 DIABETES MELLITUS WITH HYPOGLYCEMIA WITHOUT COMA: ICD-10-CM

## 2024-10-15 LAB
ATRIAL RATE: 82 BPM
P AXIS: 45 DEGREES
P OFFSET: 187 MS
P ONSET: 149 MS
PR INTERVAL: 132 MS
Q ONSET: 215 MS
QRS COUNT: 14 BEATS
QRS DURATION: 76 MS
QT INTERVAL: 376 MS
QTC CALCULATION(BAZETT): 439 MS
QTC FREDERICIA: 417 MS
R AXIS: -51 DEGREES
T AXIS: 42 DEGREES
T OFFSET: 403 MS
VENTRICULAR RATE: 82 BPM

## 2024-10-15 RX ORDER — INSULIN GLARGINE 100 [IU]/ML
30 INJECTION, SOLUTION SUBCUTANEOUS NIGHTLY
Qty: 15 ML | Refills: 0 | Status: SHIPPED | OUTPATIENT
Start: 2024-10-15

## 2024-10-24 DIAGNOSIS — K21.9 GASTROESOPHAGEAL REFLUX DISEASE WITHOUT ESOPHAGITIS: ICD-10-CM

## 2024-10-24 DIAGNOSIS — B02.29 POST HERPETIC NEURALGIA: ICD-10-CM

## 2024-10-24 DIAGNOSIS — L29.9 ITCHING: ICD-10-CM

## 2024-10-25 RX ORDER — SUCRALFATE 1 G/1
TABLET ORAL
Qty: 180 TABLET | Refills: 0 | Status: SHIPPED | OUTPATIENT
Start: 2024-10-25

## 2024-10-25 RX ORDER — HYDROXYZINE HYDROCHLORIDE 25 MG/1
TABLET, FILM COATED ORAL
Qty: 30 TABLET | Refills: 0 | Status: SHIPPED | OUTPATIENT
Start: 2024-10-25

## 2024-10-25 RX ORDER — PANTOPRAZOLE SODIUM 40 MG/1
TABLET, DELAYED RELEASE ORAL
Qty: 90 TABLET | Refills: 0 | Status: SHIPPED | OUTPATIENT
Start: 2024-10-25

## 2024-10-28 ENCOUNTER — APPOINTMENT (OUTPATIENT)
Dept: RADIOLOGY | Facility: HOSPITAL | Age: 69
End: 2024-10-28
Payer: MEDICARE

## 2024-10-29 ENCOUNTER — APPOINTMENT (OUTPATIENT)
Dept: PRIMARY CARE | Facility: CLINIC | Age: 69
End: 2024-10-29
Payer: MEDICARE

## 2024-10-29 VITALS
BODY MASS INDEX: 27.66 KG/M2 | RESPIRATION RATE: 18 BRPM | HEART RATE: 80 BPM | DIASTOLIC BLOOD PRESSURE: 73 MMHG | SYSTOLIC BLOOD PRESSURE: 110 MMHG | HEIGHT: 64 IN | WEIGHT: 162 LBS | OXYGEN SATURATION: 90 %

## 2024-10-29 DIAGNOSIS — N95.9 UNSPECIFIED MENOPAUSAL AND PERIMENOPAUSAL DISORDER: ICD-10-CM

## 2024-10-29 DIAGNOSIS — F41.9 ANXIETY AND DEPRESSION: ICD-10-CM

## 2024-10-29 DIAGNOSIS — L02.11 NECK ABSCESS: ICD-10-CM

## 2024-10-29 DIAGNOSIS — Z13.220 LIPID SCREENING: ICD-10-CM

## 2024-10-29 DIAGNOSIS — I95.9 HYPOTENSION, UNSPECIFIED HYPOTENSION TYPE: ICD-10-CM

## 2024-10-29 DIAGNOSIS — Z79.4 DIABETES MELLITUS TYPE 2, INSULIN DEPENDENT (MULTI): ICD-10-CM

## 2024-10-29 DIAGNOSIS — R79.9 ABNORMAL FINDING OF BLOOD CHEMISTRY, UNSPECIFIED: ICD-10-CM

## 2024-10-29 DIAGNOSIS — G47.00 INSOMNIA, UNSPECIFIED TYPE: ICD-10-CM

## 2024-10-29 DIAGNOSIS — Z79.4 TYPE 2 DIABETES MELLITUS WITH DIABETIC NEUROPATHY, WITH LONG-TERM CURRENT USE OF INSULIN: ICD-10-CM

## 2024-10-29 DIAGNOSIS — R42 DIZZINESS: ICD-10-CM

## 2024-10-29 DIAGNOSIS — K21.9 GASTROESOPHAGEAL REFLUX DISEASE WITHOUT ESOPHAGITIS: ICD-10-CM

## 2024-10-29 DIAGNOSIS — E11.9 DIABETES MELLITUS TYPE 2, INSULIN DEPENDENT (MULTI): ICD-10-CM

## 2024-10-29 DIAGNOSIS — E78.00 HYPERCHOLESTEREMIA: ICD-10-CM

## 2024-10-29 DIAGNOSIS — G25.81 RLS (RESTLESS LEGS SYNDROME): ICD-10-CM

## 2024-10-29 DIAGNOSIS — Z00.00 ENCOUNTER FOR ANNUAL WELLNESS VISIT (AWV) IN MEDICARE PATIENT: ICD-10-CM

## 2024-10-29 DIAGNOSIS — E55.9 VITAMIN D DEFICIENCY, UNSPECIFIED: ICD-10-CM

## 2024-10-29 DIAGNOSIS — J45.20 INTERMITTENT ASTHMA, UNSPECIFIED ASTHMA SEVERITY, UNSPECIFIED WHETHER COMPLICATED (HHS-HCC): ICD-10-CM

## 2024-10-29 DIAGNOSIS — Z12.11 COLON CANCER SCREENING: ICD-10-CM

## 2024-10-29 DIAGNOSIS — F32.A ANXIETY AND DEPRESSION: ICD-10-CM

## 2024-10-29 DIAGNOSIS — E11.40 TYPE 2 DIABETES MELLITUS WITH DIABETIC NEUROPATHY, WITHOUT LONG-TERM CURRENT USE OF INSULIN: ICD-10-CM

## 2024-10-29 DIAGNOSIS — E11.40 TYPE 2 DIABETES MELLITUS WITH DIABETIC NEUROPATHY, WITH LONG-TERM CURRENT USE OF INSULIN: ICD-10-CM

## 2024-10-29 LAB — POC HEMOGLOBIN A1C: 6.5 % (ref 4.2–6.5)

## 2024-10-29 RX ORDER — ROPINIROLE 0.5 MG/1
0.5 TABLET, FILM COATED ORAL NIGHTLY
Qty: 90 TABLET | Refills: 1 | Status: SHIPPED | OUTPATIENT
Start: 2024-10-29

## 2024-10-29 RX ORDER — ALBUTEROL SULFATE 90 UG/1
2 INHALANT RESPIRATORY (INHALATION) EVERY 6 HOURS PRN
Qty: 6.7 G | Refills: 0 | Status: SHIPPED | OUTPATIENT
Start: 2024-10-29

## 2024-10-29 RX ORDER — DOXYCYCLINE 100 MG/1
100 CAPSULE ORAL 2 TIMES DAILY
Qty: 10 CAPSULE | Refills: 0 | Status: SHIPPED | OUTPATIENT
Start: 2024-10-29 | End: 2024-11-03

## 2024-10-29 RX ORDER — MECLIZINE HYDROCHLORIDE 25 MG/1
25 TABLET ORAL 3 TIMES DAILY PRN
Qty: 90 TABLET | Refills: 0 | Status: SHIPPED | OUTPATIENT
Start: 2024-10-29

## 2024-10-29 RX ORDER — MUPIROCIN 20 MG/G
OINTMENT TOPICAL 2 TIMES DAILY
Qty: 22 G | Refills: 0 | Status: SHIPPED | OUTPATIENT
Start: 2024-10-29 | End: 2024-11-08

## 2024-10-29 ASSESSMENT — ACTIVITIES OF DAILY LIVING (ADL)
DRESSING: INDEPENDENT
MANAGING_FINANCES: INDEPENDENT
BATHING: INDEPENDENT
DOING_HOUSEWORK: INDEPENDENT
GROCERY_SHOPPING: INDEPENDENT
TAKING_MEDICATION: INDEPENDENT

## 2024-10-29 ASSESSMENT — PATIENT HEALTH QUESTIONNAIRE - PHQ9
2. FEELING DOWN, DEPRESSED OR HOPELESS: SEVERAL DAYS
1. LITTLE INTEREST OR PLEASURE IN DOING THINGS: NOT AT ALL
SUM OF ALL RESPONSES TO PHQ9 QUESTIONS 1 AND 2: 1

## 2024-10-29 ASSESSMENT — PAIN SCALES - GENERAL: PAINLEVEL_OUTOF10: 0-NO PAIN

## 2024-10-31 ENCOUNTER — HOSPITAL ENCOUNTER (OUTPATIENT)
Dept: RADIOLOGY | Facility: HOSPITAL | Age: 69
Discharge: HOME | End: 2024-10-31
Payer: MEDICARE

## 2024-10-31 VITALS — BODY MASS INDEX: 23.05 KG/M2 | HEIGHT: 64 IN | WEIGHT: 135 LBS

## 2024-10-31 DIAGNOSIS — Z12.31 ENCOUNTER FOR SCREENING MAMMOGRAM FOR MALIGNANT NEOPLASM OF BREAST: ICD-10-CM

## 2024-10-31 DIAGNOSIS — Z12.11 ENCOUNTER FOR SCREENING FOR MALIGNANT NEOPLASM OF COLON: ICD-10-CM

## 2024-10-31 PROBLEM — E11.9 TYPE 2 DIABETES MELLITUS WITHOUT COMPLICATIONS (MULTI): Status: RESOLVED | Noted: 2021-04-20 | Resolved: 2024-10-31

## 2024-10-31 PROBLEM — E11.42 DIABETIC POLYNEUROPATHY ASSOCIATED WITH TYPE 2 DIABETES MELLITUS (MULTI): Status: RESOLVED | Noted: 2019-05-17 | Resolved: 2024-10-31

## 2024-10-31 PROBLEM — E11.40 TYPE 2 DIABETES MELLITUS WITH DIABETIC NEUROPATHY, WITHOUT LONG-TERM CURRENT USE OF INSULIN: Status: ACTIVE | Noted: 2023-03-25

## 2024-10-31 PROBLEM — Z79.4 TYPE 2 DIABETES MELLITUS WITH DIABETIC NEUROPATHY, WITH LONG-TERM CURRENT USE OF INSULIN: Status: ACTIVE | Noted: 2023-03-25

## 2024-10-31 PROCEDURE — 77063 BREAST TOMOSYNTHESIS BI: CPT | Performed by: RADIOLOGY

## 2024-10-31 PROCEDURE — 77063 BREAST TOMOSYNTHESIS BI: CPT

## 2024-10-31 PROCEDURE — 77067 SCR MAMMO BI INCL CAD: CPT | Performed by: RADIOLOGY

## 2024-10-31 ASSESSMENT — ENCOUNTER SYMPTOMS
SLEEP DISTURBANCE: 1
MYALGIAS: 1
ENDOCRINE NEGATIVE: 1
CARDIOVASCULAR NEGATIVE: 1
GASTROINTESTINAL NEGATIVE: 1
RESPIRATORY NEGATIVE: 1
EYES NEGATIVE: 1
NEUROLOGICAL NEGATIVE: 1
CONSTITUTIONAL NEGATIVE: 1
WOUND: 1
ARTHRALGIAS: 1

## 2024-11-08 ENCOUNTER — APPOINTMENT (OUTPATIENT)
Dept: OBSTETRICS AND GYNECOLOGY | Facility: CLINIC | Age: 69
End: 2024-11-08
Payer: MEDICARE

## 2024-11-22 ENCOUNTER — APPOINTMENT (OUTPATIENT)
Dept: OBSTETRICS AND GYNECOLOGY | Facility: CLINIC | Age: 69
End: 2024-11-22
Payer: MEDICARE

## 2024-11-22 DIAGNOSIS — B02.29 POST HERPETIC NEURALGIA: ICD-10-CM

## 2024-11-22 DIAGNOSIS — L29.9 ITCHING: ICD-10-CM

## 2024-11-22 DIAGNOSIS — F33.9 EPISODE OF RECURRENT MAJOR DEPRESSIVE DISORDER, UNSPECIFIED DEPRESSION EPISODE SEVERITY (CMS-HCC): ICD-10-CM

## 2024-11-25 RX ORDER — ESCITALOPRAM OXALATE 10 MG/1
10 TABLET ORAL DAILY
Qty: 90 TABLET | Refills: 11 | Status: SHIPPED | OUTPATIENT
Start: 2024-11-25

## 2024-11-25 RX ORDER — HYDROXYZINE HYDROCHLORIDE 25 MG/1
TABLET, FILM COATED ORAL
Qty: 30 TABLET | Refills: 11 | Status: SHIPPED | OUTPATIENT
Start: 2024-11-25

## 2024-12-07 DIAGNOSIS — E11.649 UNCONTROLLED TYPE 2 DIABETES MELLITUS WITH HYPOGLYCEMIA WITHOUT COMA: ICD-10-CM

## 2024-12-09 ENCOUNTER — HOSPITAL ENCOUNTER (OUTPATIENT)
Dept: RADIOLOGY | Facility: HOSPITAL | Age: 69
Discharge: HOME | End: 2024-12-09
Payer: MEDICARE

## 2024-12-09 DIAGNOSIS — Z00.00 ENCOUNTER FOR ANNUAL WELLNESS VISIT (AWV) IN MEDICARE PATIENT: ICD-10-CM

## 2024-12-09 DIAGNOSIS — N95.9 UNSPECIFIED MENOPAUSAL AND PERIMENOPAUSAL DISORDER: ICD-10-CM

## 2024-12-09 PROCEDURE — 77080 DXA BONE DENSITY AXIAL: CPT | Performed by: RADIOLOGY

## 2024-12-09 PROCEDURE — 77080 DXA BONE DENSITY AXIAL: CPT

## 2024-12-09 RX ORDER — INSULIN GLARGINE 100 [IU]/ML
30 INJECTION, SOLUTION SUBCUTANEOUS NIGHTLY
Qty: 15 ML | Refills: 0 | Status: SHIPPED | OUTPATIENT
Start: 2024-12-09

## 2025-01-10 ENCOUNTER — APPOINTMENT (OUTPATIENT)
Dept: OBSTETRICS AND GYNECOLOGY | Facility: CLINIC | Age: 70
End: 2025-01-10
Payer: MEDICARE

## 2025-01-10 VITALS — BODY MASS INDEX: 27.46 KG/M2 | SYSTOLIC BLOOD PRESSURE: 130 MMHG | DIASTOLIC BLOOD PRESSURE: 74 MMHG | WEIGHT: 160 LBS

## 2025-01-10 DIAGNOSIS — R35.0 URINARY FREQUENCY: Primary | ICD-10-CM

## 2025-01-10 DIAGNOSIS — R33.9 INCOMPLETE EMPTYING OF BLADDER: ICD-10-CM

## 2025-01-10 ASSESSMENT — ENCOUNTER SYMPTOMS
ENDOCRINE NEGATIVE: 1
CARDIOVASCULAR NEGATIVE: 1
EYES NEGATIVE: 1
NEUROLOGICAL NEGATIVE: 1
MUSCULOSKELETAL NEGATIVE: 1
GASTROINTESTINAL NEGATIVE: 1
PSYCHIATRIC NEGATIVE: 1
RESPIRATORY NEGATIVE: 1
CONSTITUTIONAL NEGATIVE: 1
FREQUENCY: 1

## 2025-01-10 NOTE — PROGRESS NOTES
Referring Physician: Faraz Ortega MD     General medical background: None    Obstetric/Gynecologic History:  Lenora Ames has a history of : 2. Para: 2. No history of  section(s).     Past Evaluation and Treatment:  Past evaluation includes: This problem has not been previously evaluated  Past treatment includes: This problem has not been previously treated    Review of Systems   Constitutional: Negative.    HENT: Negative.     Eyes: Negative.    Respiratory: Negative.     Cardiovascular: Negative.    Gastrointestinal: Negative.    Endocrine: Negative.    Genitourinary:  Positive for frequency.   Musculoskeletal: Negative.    Neurological: Negative.    Psychiatric/Behavioral: Negative.          HPI  69 year old female presenting as a new patient referral from Dr. Faraz Ortega (her PCP) for evaluation of urinary incontinence. She presents today with her daughter, Radha.     Urinary Symptoms:  - She endorses urinary frequency with voiding 4-5x per hour which is very disruptive to her day.   - The patient has ambulation difficulty due to having a past medical hx of having a stroke. It takes her a while to be able to get into bed and by the time she gets into bed she feels the urge to void again.   - Patient wears Depends due to experiencing UUI episodes and urinary frequency/nocturia that are complicated by slow ambulation secondary to hx of CVA x2. Her most recent stroke was 2 years ago.   - She endorses longstanding hx of several years with OAB symptoms unrelated to her stroke.   - Does have significant vertigo and reading/writing deficits after her stroke.   - She has a hx of a bladder sling placed when she was 22 years old due to having severe JONATHAN at that time.   - Drinks caffeine daily and smokes cigarettes.     OBGYN History and Symptoms:  - , x2   - Denies any vulvovaginal itching or irritation.     Testing results:  PVR results are available and reviewed:  0mL by bladder  U/S  UA results available and reviewed    Laboratory:   Urine dipstick shows negative.        Physical Exam  Constitutional:       General: She is not in acute distress.     Appearance: Normal appearance.   Genitourinary:      Bladder and urethral meatus normal.      No lesions in the vagina.      Genitourinary Comments: No mesh erosion from prior bladder sling visualized or palpated. There is some erythema over her bilateral groin consistent with contact dermatitis from wearing Depends due to urinary incontinence.       Right Labia: No lesions or skin changes.     Left Labia: No lesions or skin changes.     No vaginal discharge or bleeding.      No vaginal prolapse present.     No vaginal atrophy present.       Right Adnexa: not tender and no mass present.     Left Adnexa: not tender and no mass present.     No cervical motion tenderness or lesion.      Uterus is not enlarged, fixed or tender.      No uterine mass detected.     No urethral tenderness or mass present.      Bladder is not tender.       Levator ani not tender and no pelvic spasms present.  HENT:      Head: Normocephalic and atraumatic.   Pulmonary:      Effort: Pulmonary effort is normal.   Psychiatric:         Mood and Affect: Mood normal.         Behavior: Behavior normal.         Thought Content: Thought content normal.         Judgment: Judgment normal.          Assessment and Plan  69 year old female with urinary frequency, OAB, and incomplete bladder emptying. She has a hx of a bladder sling placed at age 22. Comorbidities include: HTN, hx of arterial embolism, type 2 diabetes, GERD, asthma, COPD, and hx of stroke.     Diagnoses:  #1 Neurogenic bladder  #2 History of stroke  #3 History of bladder sling    Plan:  1. OAB, urinary frequency, ddx: NGB secondary to stroke  - PVR = 0mL by bladder U/S and UA is negative.   - Patient wears Depends due to experiencing UUI episodes and urinary frequency/nocturia that are complicated by slow ambulation  secondary to hx of CVA x2. Her most recent stroke was 2 years ago but had OAB symptoms prior to her CVA.  - We discussed OAB lifestyle changes (i.e., trying to limit fluids to 60oz in total per day, timed voiding every 2-3 hours, stop drinking fluids 3 hours prior to bedtime, and avoiding/limiting bladder irritants such as caffeine, nicotine, artificial sweeteners, acidic/spicy foods, and alcohol).  - Recommended good diabetes management with optimal A1c range being 6.5% and below to reduce OAB symptoms as poor diabetic control can cause neurological issues with the sacral nerve that controls bladder function and if she has persistent hyperglycemia this can cause glycosuria which can irritate the bladder. We encouraged continued glucose monitoring, good dietary habits with low carbohydrate diet, exercise, and compliance with medication regimen to better control blood glucose levels.   - Discussed OAB treatment options such as lifestyle changes, PFPT, medications, PTNS, intradetrusor Botox injections, or SNM.   - We discussed that PFPT may help with bladder/pelvic floor restrengthening exercises with an overall goal to better control the bladder and improve urinary symptoms. PFPT includes attending sessions with a specialized licensed female pelvic floor physical therapist who does external with internal vaginal work to ensure she is doing the correct exercises to obtain the most benefit of physical therapy. We reviewed the importance of continuing these home exercises to receive maximum benefit from PFPT. They also teach mind over bladder strategies/urge suppression techniques to reduce the intensity of the urge to void.   - We discussed that with starting an OAB medication the goal would be to allow the detrusor muscle around the bladder to relax and prevent the muscles from spasm/squeezing too frequently to allow the bladder to store more urine which reduces the urge, frequency, and incontinent episodes.   - She  is amenable to trying behavioral lifestyle changes with reducing caffeine intake, attempt to quit smoking, and managing her diabetes with getting a glucose monitor to reduce OAB symptoms. We gave her a 90-day supply sample of Myrbetriq if she decides to try this medication after being reassured it has a low risk of side effects.   > She is not a candidate for anticholinergics due to her hx of CVA and balance issues at baseline as the risks of anticholinergics would outweigh the benefits. She is an ideal candidate to try beta 3 agonist (I.e. Myrbetriq or Gemtesa).  > We gave her PI handouts on urodynamics, intradetrusor Botox, and SNM to review and consider.    Follow up in 8 weeks with LINK Sanford.     Scribe Attestation  By signing my name below, IAmrik Scribe, attest that this documentation has been prepared under the direction and in the presence of LINK Sanford on 01/10/2025 at 4:42 PM.     I, LINK Sanford, personally performed the services described in this documentation which was scribed virtually and I confirm that it is both accurate and complete.     LINK Sanford

## 2025-01-18 DIAGNOSIS — E11.649 UNCONTROLLED TYPE 2 DIABETES MELLITUS WITH HYPOGLYCEMIA WITHOUT COMA: ICD-10-CM

## 2025-01-20 RX ORDER — INSULIN GLARGINE 100 [IU]/ML
30 INJECTION, SOLUTION SUBCUTANEOUS NIGHTLY
Qty: 15 ML | Refills: 0 | Status: SHIPPED | OUTPATIENT
Start: 2025-01-20

## 2025-01-21 DIAGNOSIS — Z79.4 DIABETES MELLITUS TYPE 2, INSULIN DEPENDENT (MULTI): ICD-10-CM

## 2025-01-21 DIAGNOSIS — E11.9 DIABETES MELLITUS TYPE 2, INSULIN DEPENDENT (MULTI): ICD-10-CM

## 2025-01-22 DIAGNOSIS — Z79.4 TYPE 2 DIABETES MELLITUS WITH DIABETIC NEUROPATHY, WITH LONG-TERM CURRENT USE OF INSULIN: ICD-10-CM

## 2025-01-22 DIAGNOSIS — E11.40 TYPE 2 DIABETES MELLITUS WITH DIABETIC NEUROPATHY, WITH LONG-TERM CURRENT USE OF INSULIN: ICD-10-CM

## 2025-01-22 RX ORDER — BLOOD SUGAR DIAGNOSTIC
STRIP MISCELLANEOUS
Qty: 30 STRIP | Refills: 2 | Status: SHIPPED | OUTPATIENT
Start: 2025-01-22

## 2025-01-23 DIAGNOSIS — K21.9 GASTROESOPHAGEAL REFLUX DISEASE WITHOUT ESOPHAGITIS: ICD-10-CM

## 2025-01-23 DIAGNOSIS — E78.00 HYPERCHOLESTEREMIA: ICD-10-CM

## 2025-01-23 RX ORDER — ATORVASTATIN CALCIUM 20 MG/1
20 TABLET, FILM COATED ORAL
Qty: 90 TABLET | Refills: 0 | Status: SHIPPED | OUTPATIENT
Start: 2025-01-23

## 2025-01-23 RX ORDER — PANTOPRAZOLE SODIUM 40 MG/1
TABLET, DELAYED RELEASE ORAL
Qty: 90 TABLET | Refills: 0 | Status: SHIPPED | OUTPATIENT
Start: 2025-01-23

## 2025-01-23 RX ORDER — SUCRALFATE 1 G/1
TABLET ORAL
Qty: 180 TABLET | Refills: 11 | OUTPATIENT
Start: 2025-01-23

## 2025-01-28 DIAGNOSIS — E11.40 TYPE 2 DIABETES MELLITUS WITH DIABETIC NEUROPATHY, WITH LONG-TERM CURRENT USE OF INSULIN: ICD-10-CM

## 2025-01-28 DIAGNOSIS — Z79.4 TYPE 2 DIABETES MELLITUS WITH DIABETIC NEUROPATHY, WITH LONG-TERM CURRENT USE OF INSULIN: ICD-10-CM

## 2025-01-28 RX ORDER — BLOOD SUGAR DIAGNOSTIC
STRIP MISCELLANEOUS
Qty: 30 STRIP | Refills: 2 | Status: SHIPPED | OUTPATIENT
Start: 2025-01-28

## 2025-02-14 DIAGNOSIS — K21.9 GASTROESOPHAGEAL REFLUX DISEASE WITHOUT ESOPHAGITIS: ICD-10-CM

## 2025-02-14 RX ORDER — SUCRALFATE 1 G/1
TABLET ORAL
Qty: 180 TABLET | Refills: 0 | Status: SHIPPED | OUTPATIENT
Start: 2025-02-14

## 2025-02-17 DIAGNOSIS — E11.40 TYPE 2 DIABETES MELLITUS WITH DIABETIC NEUROPATHY, WITH LONG-TERM CURRENT USE OF INSULIN: ICD-10-CM

## 2025-02-17 DIAGNOSIS — Z79.4 TYPE 2 DIABETES MELLITUS WITH DIABETIC NEUROPATHY, WITH LONG-TERM CURRENT USE OF INSULIN: ICD-10-CM

## 2025-02-17 DIAGNOSIS — J45.20 INTERMITTENT ASTHMA, UNSPECIFIED ASTHMA SEVERITY, UNSPECIFIED WHETHER COMPLICATED (HHS-HCC): ICD-10-CM

## 2025-02-17 RX ORDER — CALCIUM CITRATE/VITAMIN D3 200MG-6.25
TABLET ORAL
Qty: 200 STRIP | Refills: 2 | Status: SHIPPED | OUTPATIENT
Start: 2025-02-17

## 2025-02-17 RX ORDER — ALBUTEROL SULFATE 90 UG/1
2 INHALANT RESPIRATORY (INHALATION) EVERY 6 HOURS PRN
Qty: 18 G | Refills: 2 | Status: SHIPPED | OUTPATIENT
Start: 2025-02-17

## 2025-03-02 DIAGNOSIS — E11.649 UNCONTROLLED TYPE 2 DIABETES MELLITUS WITH HYPOGLYCEMIA WITHOUT COMA: ICD-10-CM

## 2025-03-03 DIAGNOSIS — E11.9 TYPE 2 DIABETES MELLITUS WITHOUT COMPLICATION, UNSPECIFIED WHETHER LONG TERM INSULIN USE (MULTI): ICD-10-CM

## 2025-03-03 RX ORDER — CALCIUM CITRATE/VITAMIN D3 200MG-6.25
TABLET ORAL
Qty: 400 EACH | Refills: 0 | Status: SHIPPED | OUTPATIENT
Start: 2025-03-03

## 2025-03-03 RX ORDER — INSULIN GLARGINE 100 [IU]/ML
30 INJECTION, SOLUTION SUBCUTANEOUS NIGHTLY
Qty: 15 ML | Refills: 0 | Status: SHIPPED | OUTPATIENT
Start: 2025-03-03

## 2025-03-14 ENCOUNTER — APPOINTMENT (OUTPATIENT)
Dept: OBSTETRICS AND GYNECOLOGY | Facility: CLINIC | Age: 70
End: 2025-03-14
Payer: MEDICARE

## 2025-04-12 DIAGNOSIS — E11.649 UNCONTROLLED TYPE 2 DIABETES MELLITUS WITH HYPOGLYCEMIA WITHOUT COMA: ICD-10-CM

## 2025-04-14 RX ORDER — INSULIN GLARGINE 100 [IU]/ML
30 INJECTION, SOLUTION SUBCUTANEOUS NIGHTLY
Qty: 15 ML | Refills: 0 | Status: SHIPPED | OUTPATIENT
Start: 2025-04-14

## 2025-04-21 DIAGNOSIS — Z79.4 TYPE 2 DIABETES MELLITUS WITH DIABETIC NEUROPATHY, WITH LONG-TERM CURRENT USE OF INSULIN: ICD-10-CM

## 2025-04-21 DIAGNOSIS — E11.40 TYPE 2 DIABETES MELLITUS WITH DIABETIC NEUROPATHY, WITH LONG-TERM CURRENT USE OF INSULIN: ICD-10-CM

## 2025-04-21 RX ORDER — BLOOD SUGAR DIAGNOSTIC
STRIP MISCELLANEOUS
Qty: 100 STRIP | Refills: 2 | Status: ON HOLD | OUTPATIENT
Start: 2025-04-21

## 2025-04-22 DIAGNOSIS — E78.00 HYPERCHOLESTEREMIA: ICD-10-CM

## 2025-04-22 DIAGNOSIS — K21.9 GASTROESOPHAGEAL REFLUX DISEASE WITHOUT ESOPHAGITIS: ICD-10-CM

## 2025-04-22 RX ORDER — PANTOPRAZOLE SODIUM 40 MG/1
TABLET, DELAYED RELEASE ORAL
Qty: 90 TABLET | Refills: 0 | Status: ON HOLD | OUTPATIENT
Start: 2025-04-22

## 2025-04-22 RX ORDER — ATORVASTATIN CALCIUM 20 MG/1
20 TABLET, FILM COATED ORAL
Qty: 90 TABLET | Refills: 0 | Status: ON HOLD | OUTPATIENT
Start: 2025-04-22

## 2025-04-26 ENCOUNTER — APPOINTMENT (OUTPATIENT)
Dept: RADIOLOGY | Facility: HOSPITAL | Age: 70
DRG: 189 | End: 2025-04-26
Payer: MEDICARE

## 2025-04-26 ENCOUNTER — APPOINTMENT (OUTPATIENT)
Dept: CARDIOLOGY | Facility: HOSPITAL | Age: 70
DRG: 189 | End: 2025-04-26
Payer: MEDICARE

## 2025-04-26 ENCOUNTER — HOSPITAL ENCOUNTER (INPATIENT)
Facility: HOSPITAL | Age: 70
DRG: 189 | End: 2025-04-26
Attending: INTERNAL MEDICINE | Admitting: HOSPITALIST
Payer: MEDICARE

## 2025-04-26 DIAGNOSIS — G89.29 CHRONIC LOW BACK PAIN, UNSPECIFIED BACK PAIN LATERALITY, UNSPECIFIED WHETHER SCIATICA PRESENT: ICD-10-CM

## 2025-04-26 DIAGNOSIS — J20.9 ACUTE BRONCHITIS, UNSPECIFIED ORGANISM: ICD-10-CM

## 2025-04-26 DIAGNOSIS — M54.50 CHRONIC LOW BACK PAIN, UNSPECIFIED BACK PAIN LATERALITY, UNSPECIFIED WHETHER SCIATICA PRESENT: ICD-10-CM

## 2025-04-26 DIAGNOSIS — J44.1 COPD EXACERBATION (MULTI): ICD-10-CM

## 2025-04-26 DIAGNOSIS — J44.1 COPD WITH ACUTE EXACERBATION (MULTI): Primary | ICD-10-CM

## 2025-04-26 DIAGNOSIS — G47.33 OSA (OBSTRUCTIVE SLEEP APNEA): ICD-10-CM

## 2025-04-26 DIAGNOSIS — R09.02 HYPOXIA: ICD-10-CM

## 2025-04-26 DIAGNOSIS — R06.02 SHORTNESS OF BREATH: ICD-10-CM

## 2025-04-26 LAB
ALBUMIN SERPL BCP-MCNC: 4.1 G/DL (ref 3.4–5)
ALP SERPL-CCNC: 115 U/L (ref 33–136)
ALT SERPL W P-5'-P-CCNC: 13 U/L (ref 7–45)
ANION GAP SERPL CALC-SCNC: 13 MMOL/L (ref 10–20)
AST SERPL W P-5'-P-CCNC: 13 U/L (ref 9–39)
BASOPHILS # BLD AUTO: 0.12 X10*3/UL (ref 0–0.1)
BASOPHILS NFR BLD AUTO: 1.2 %
BILIRUB SERPL-MCNC: 0.6 MG/DL (ref 0–1.2)
BNP SERPL-MCNC: 33 PG/ML (ref 0–99)
BUN SERPL-MCNC: 10 MG/DL (ref 6–23)
CALCIUM SERPL-MCNC: 10.4 MG/DL (ref 8.6–10.3)
CARDIAC TROPONIN I PNL SERPL HS: 3 NG/L (ref 0–13)
CARDIAC TROPONIN I PNL SERPL HS: 3 NG/L (ref 0–13)
CHLORIDE SERPL-SCNC: 99 MMOL/L (ref 98–107)
CO2 SERPL-SCNC: 28 MMOL/L (ref 21–32)
CREAT SERPL-MCNC: 0.66 MG/DL (ref 0.5–1.05)
EGFRCR SERPLBLD CKD-EPI 2021: >90 ML/MIN/1.73M*2
EOSINOPHIL # BLD AUTO: 0.71 X10*3/UL (ref 0–0.7)
EOSINOPHIL NFR BLD AUTO: 7.1 %
ERYTHROCYTE [DISTWIDTH] IN BLOOD BY AUTOMATED COUNT: 12.9 % (ref 11.5–14.5)
FLUAV RNA RESP QL NAA+PROBE: NOT DETECTED
FLUBV RNA RESP QL NAA+PROBE: NOT DETECTED
GLUCOSE BLD MANUAL STRIP-MCNC: 249 MG/DL (ref 74–99)
GLUCOSE SERPL-MCNC: 217 MG/DL (ref 74–99)
HCT VFR BLD AUTO: 47.9 % (ref 36–46)
HGB BLD-MCNC: 15.5 G/DL (ref 12–16)
IMM GRANULOCYTES # BLD AUTO: 0.02 X10*3/UL (ref 0–0.7)
IMM GRANULOCYTES NFR BLD AUTO: 0.2 % (ref 0–0.9)
LYMPHOCYTES # BLD AUTO: 3 X10*3/UL (ref 1.2–4.8)
LYMPHOCYTES NFR BLD AUTO: 30.1 %
MAGNESIUM SERPL-MCNC: 1.85 MG/DL (ref 1.6–2.4)
MCH RBC QN AUTO: 29.6 PG (ref 26–34)
MCHC RBC AUTO-ENTMCNC: 32.4 G/DL (ref 32–36)
MCV RBC AUTO: 92 FL (ref 80–100)
MONOCYTES # BLD AUTO: 0.81 X10*3/UL (ref 0.1–1)
MONOCYTES NFR BLD AUTO: 8.1 %
NEUTROPHILS # BLD AUTO: 5.32 X10*3/UL (ref 1.2–7.7)
NEUTROPHILS NFR BLD AUTO: 53.3 %
NRBC BLD-RTO: 0 /100 WBCS (ref 0–0)
PLATELET # BLD AUTO: 258 X10*3/UL (ref 150–450)
POTASSIUM SERPL-SCNC: 4.2 MMOL/L (ref 3.5–5.3)
PROT SERPL-MCNC: 7.9 G/DL (ref 6.4–8.2)
RBC # BLD AUTO: 5.23 X10*6/UL (ref 4–5.2)
RSV RNA RESP QL NAA+PROBE: NOT DETECTED
SARS-COV-2 RNA RESP QL NAA+PROBE: NOT DETECTED
SODIUM SERPL-SCNC: 136 MMOL/L (ref 136–145)
WBC # BLD AUTO: 10 X10*3/UL (ref 4.4–11.3)

## 2025-04-26 PROCEDURE — 94640 AIRWAY INHALATION TREATMENT: CPT

## 2025-04-26 PROCEDURE — 99285 EMERGENCY DEPT VISIT HI MDM: CPT | Mod: 25

## 2025-04-26 PROCEDURE — 85025 COMPLETE CBC W/AUTO DIFF WBC: CPT

## 2025-04-26 PROCEDURE — 71045 X-RAY EXAM CHEST 1 VIEW: CPT | Performed by: RADIOLOGY

## 2025-04-26 PROCEDURE — 96374 THER/PROPH/DIAG INJ IV PUSH: CPT

## 2025-04-26 PROCEDURE — S4991 NICOTINE PATCH NONLEGEND: HCPCS | Mod: IPSPLIT | Performed by: HOSPITALIST

## 2025-04-26 PROCEDURE — 94760 N-INVAS EAR/PLS OXIMETRY 1: CPT

## 2025-04-26 PROCEDURE — 87637 SARSCOV2&INF A&B&RSV AMP PRB: CPT

## 2025-04-26 PROCEDURE — 80053 COMPREHEN METABOLIC PANEL: CPT

## 2025-04-26 PROCEDURE — 2500000005 HC RX 250 GENERAL PHARMACY W/O HCPCS

## 2025-04-26 PROCEDURE — 2500000001 HC RX 250 WO HCPCS SELF ADMINISTERED DRUGS (ALT 637 FOR MEDICARE OP): Mod: IPSPLIT | Performed by: HOSPITALIST

## 2025-04-26 PROCEDURE — 94664 DEMO&/EVAL PT USE INHALER: CPT | Mod: 59

## 2025-04-26 PROCEDURE — 1100000001 HC PRIVATE ROOM DAILY: Mod: IPSPLIT

## 2025-04-26 PROCEDURE — 84484 ASSAY OF TROPONIN QUANT: CPT

## 2025-04-26 PROCEDURE — 82947 ASSAY GLUCOSE BLOOD QUANT: CPT | Mod: IPSPLIT

## 2025-04-26 PROCEDURE — 36415 COLL VENOUS BLD VENIPUNCTURE: CPT

## 2025-04-26 PROCEDURE — 83735 ASSAY OF MAGNESIUM: CPT

## 2025-04-26 PROCEDURE — 2500000004 HC RX 250 GENERAL PHARMACY W/ HCPCS (ALT 636 FOR OP/ED): Mod: JZ,TB

## 2025-04-26 PROCEDURE — 93005 ELECTROCARDIOGRAM TRACING: CPT

## 2025-04-26 PROCEDURE — 2500000002 HC RX 250 W HCPCS SELF ADMINISTERED DRUGS (ALT 637 FOR MEDICARE OP, ALT 636 FOR OP/ED): Mod: IPSPLIT | Performed by: HOSPITALIST

## 2025-04-26 PROCEDURE — 83880 ASSAY OF NATRIURETIC PEPTIDE: CPT

## 2025-04-26 PROCEDURE — 71045 X-RAY EXAM CHEST 1 VIEW: CPT

## 2025-04-26 PROCEDURE — 2500000005 HC RX 250 GENERAL PHARMACY W/O HCPCS: Mod: IPSPLIT | Performed by: HOSPITALIST

## 2025-04-26 RX ORDER — IPRATROPIUM BROMIDE AND ALBUTEROL SULFATE 2.5; .5 MG/3ML; MG/3ML
3 SOLUTION RESPIRATORY (INHALATION)
Status: DISCONTINUED | OUTPATIENT
Start: 2025-04-26 | End: 2025-04-26

## 2025-04-26 RX ORDER — NAPROXEN SODIUM 220 MG/1
81 TABLET, FILM COATED ORAL DAILY
Status: DISCONTINUED | OUTPATIENT
Start: 2025-04-27 | End: 2025-04-29 | Stop reason: HOSPADM

## 2025-04-26 RX ORDER — ENOXAPARIN SODIUM 100 MG/ML
40 INJECTION SUBCUTANEOUS DAILY
Status: DISCONTINUED | OUTPATIENT
Start: 2025-04-27 | End: 2025-04-29 | Stop reason: HOSPADM

## 2025-04-26 RX ORDER — PANTOPRAZOLE SODIUM 40 MG/1
40 TABLET, DELAYED RELEASE ORAL
Status: DISCONTINUED | OUTPATIENT
Start: 2025-04-27 | End: 2025-04-29 | Stop reason: HOSPADM

## 2025-04-26 RX ORDER — SUCRALFATE 1 G/1
1 TABLET ORAL
Status: DISCONTINUED | OUTPATIENT
Start: 2025-04-27 | End: 2025-04-29 | Stop reason: HOSPADM

## 2025-04-26 RX ORDER — ALUMINUM HYDROXIDE, MAGNESIUM HYDROXIDE, AND SIMETHICONE 1200; 120; 1200 MG/30ML; MG/30ML; MG/30ML
20 SUSPENSION ORAL EVERY 4 HOURS PRN
Status: DISCONTINUED | OUTPATIENT
Start: 2025-04-26 | End: 2025-04-29 | Stop reason: HOSPADM

## 2025-04-26 RX ORDER — OXYCODONE HYDROCHLORIDE 5 MG/1
5 TABLET ORAL EVERY 6 HOURS PRN
Refills: 0 | Status: DISCONTINUED | OUTPATIENT
Start: 2025-04-26 | End: 2025-04-27

## 2025-04-26 RX ORDER — IPRATROPIUM BROMIDE AND ALBUTEROL SULFATE 2.5; .5 MG/3ML; MG/3ML
3 SOLUTION RESPIRATORY (INHALATION) ONCE
Status: DISCONTINUED | OUTPATIENT
Start: 2025-04-26 | End: 2025-04-28

## 2025-04-26 RX ORDER — IBUPROFEN 200 MG
1 TABLET ORAL DAILY
Status: DISCONTINUED | OUTPATIENT
Start: 2025-04-26 | End: 2025-04-29 | Stop reason: HOSPADM

## 2025-04-26 RX ORDER — ALBUTEROL SULFATE 0.83 MG/ML
2.5 SOLUTION RESPIRATORY (INHALATION) EVERY 2 HOUR PRN
Status: DISCONTINUED | OUTPATIENT
Start: 2025-04-26 | End: 2025-04-26

## 2025-04-26 RX ORDER — ACETAMINOPHEN 325 MG/1
650 TABLET ORAL EVERY 6 HOURS PRN
Status: DISCONTINUED | OUTPATIENT
Start: 2025-04-26 | End: 2025-04-29 | Stop reason: HOSPADM

## 2025-04-26 RX ORDER — MECLIZINE HCL 12.5 MG 12.5 MG/1
25 TABLET ORAL 3 TIMES DAILY PRN
Status: DISCONTINUED | OUTPATIENT
Start: 2025-04-26 | End: 2025-04-29 | Stop reason: HOSPADM

## 2025-04-26 RX ORDER — ONDANSETRON HYDROCHLORIDE 2 MG/ML
4 INJECTION, SOLUTION INTRAVENOUS EVERY 4 HOURS PRN
Status: DISCONTINUED | OUTPATIENT
Start: 2025-04-26 | End: 2025-04-29 | Stop reason: HOSPADM

## 2025-04-26 RX ORDER — INSULIN GLARGINE 100 [IU]/ML
30 INJECTION, SOLUTION SUBCUTANEOUS NIGHTLY
Status: DISCONTINUED | OUTPATIENT
Start: 2025-04-26 | End: 2025-04-29 | Stop reason: HOSPADM

## 2025-04-26 RX ORDER — INSULIN LISPRO 100 [IU]/ML
0-10 INJECTION, SOLUTION INTRAVENOUS; SUBCUTANEOUS
Status: DISCONTINUED | OUTPATIENT
Start: 2025-04-26 | End: 2025-04-27

## 2025-04-26 RX ORDER — DEXTROSE 50 % IN WATER (D50W) INTRAVENOUS SYRINGE
25
Status: DISCONTINUED | OUTPATIENT
Start: 2025-04-26 | End: 2025-04-29 | Stop reason: HOSPADM

## 2025-04-26 RX ORDER — ACETAMINOPHEN 500 MG
10 TABLET ORAL DAILY PRN
Status: DISCONTINUED | OUTPATIENT
Start: 2025-04-26 | End: 2025-04-29 | Stop reason: HOSPADM

## 2025-04-26 RX ORDER — ESCITALOPRAM OXALATE 10 MG/1
10 TABLET ORAL DAILY
Status: DISCONTINUED | OUTPATIENT
Start: 2025-04-27 | End: 2025-04-29 | Stop reason: HOSPADM

## 2025-04-26 RX ORDER — HYDROXYZINE HYDROCHLORIDE 25 MG/1
25 TABLET, FILM COATED ORAL EVERY 8 HOURS PRN
Status: DISCONTINUED | OUTPATIENT
Start: 2025-04-26 | End: 2025-04-29 | Stop reason: HOSPADM

## 2025-04-26 RX ORDER — IPRATROPIUM BROMIDE AND ALBUTEROL SULFATE 2.5; .5 MG/3ML; MG/3ML
3 SOLUTION RESPIRATORY (INHALATION) 3 TIMES DAILY
Status: DISCONTINUED | OUTPATIENT
Start: 2025-04-27 | End: 2025-04-29 | Stop reason: HOSPADM

## 2025-04-26 RX ORDER — ROPINIROLE 0.25 MG/1
0.5 TABLET, FILM COATED ORAL NIGHTLY
Status: DISCONTINUED | OUTPATIENT
Start: 2025-04-26 | End: 2025-04-29 | Stop reason: HOSPADM

## 2025-04-26 RX ORDER — CYCLOBENZAPRINE HCL 5 MG
5 TABLET ORAL 3 TIMES DAILY PRN
Status: DISCONTINUED | OUTPATIENT
Start: 2025-04-26 | End: 2025-04-29 | Stop reason: HOSPADM

## 2025-04-26 RX ORDER — DEXTROSE 50 % IN WATER (D50W) INTRAVENOUS SYRINGE
12.5
Status: DISCONTINUED | OUTPATIENT
Start: 2025-04-26 | End: 2025-04-29 | Stop reason: HOSPADM

## 2025-04-26 RX ORDER — ATORVASTATIN CALCIUM 10 MG/1
20 TABLET, FILM COATED ORAL
Status: DISCONTINUED | OUTPATIENT
Start: 2025-04-27 | End: 2025-04-29 | Stop reason: HOSPADM

## 2025-04-26 RX ORDER — ALBUTEROL SULFATE 0.83 MG/ML
2.5 SOLUTION RESPIRATORY (INHALATION) EVERY 2 HOUR PRN
Status: DISCONTINUED | OUTPATIENT
Start: 2025-04-26 | End: 2025-04-29 | Stop reason: HOSPADM

## 2025-04-26 RX ORDER — AZITHROMYCIN 250 MG/1
500 TABLET, FILM COATED ORAL
Status: DISCONTINUED | OUTPATIENT
Start: 2025-04-26 | End: 2025-04-29 | Stop reason: HOSPADM

## 2025-04-26 RX ORDER — POLYETHYLENE GLYCOL 3350 17 G/17G
17 POWDER, FOR SOLUTION ORAL 2 TIMES DAILY PRN
Status: DISCONTINUED | OUTPATIENT
Start: 2025-04-26 | End: 2025-04-29 | Stop reason: HOSPADM

## 2025-04-26 RX ORDER — LIDOCAINE 560 MG/1
2 PATCH PERCUTANEOUS; TOPICAL; TRANSDERMAL DAILY
Status: DISCONTINUED | OUTPATIENT
Start: 2025-04-26 | End: 2025-04-29 | Stop reason: HOSPADM

## 2025-04-26 RX ORDER — GUAIFENESIN 100 MG/5ML
200 LIQUID ORAL EVERY 4 HOURS PRN
Status: DISCONTINUED | OUTPATIENT
Start: 2025-04-26 | End: 2025-04-29 | Stop reason: HOSPADM

## 2025-04-26 RX ADMIN — NICOTINE 1 PATCH: 14 PATCH, EXTENDED RELEASE TRANSDERMAL at 22:11

## 2025-04-26 RX ADMIN — INSULIN GLARGINE 30 UNITS: 100 INJECTION, SOLUTION SUBCUTANEOUS at 21:07

## 2025-04-26 RX ADMIN — METHYLPREDNISOLONE SODIUM SUCCINATE 125 MG: 125 INJECTION, POWDER, FOR SOLUTION INTRAMUSCULAR; INTRAVENOUS at 18:08

## 2025-04-26 RX ADMIN — ACETAMINOPHEN 650 MG: 325 TABLET, FILM COATED ORAL at 20:54

## 2025-04-26 RX ADMIN — HYDROXYZINE HYDROCHLORIDE 25 MG: 25 TABLET, FILM COATED ORAL at 21:05

## 2025-04-26 RX ADMIN — OXYCODONE 5 MG: 5 TABLET ORAL at 21:17

## 2025-04-26 RX ADMIN — LIDOCAINE 2 PATCH: 4 PATCH TOPICAL at 21:16

## 2025-04-26 RX ADMIN — INSULIN LISPRO 4 UNITS: 100 INJECTION, SOLUTION INTRAVENOUS; SUBCUTANEOUS at 20:55

## 2025-04-26 RX ADMIN — CYCLOBENZAPRINE HYDROCHLORIDE 5 MG: 5 TABLET, FILM COATED ORAL at 21:17

## 2025-04-26 RX ADMIN — Medication 10 MG: at 21:05

## 2025-04-26 RX ADMIN — IPRATROPIUM BROMIDE AND ALBUTEROL SULFATE 3 ML: .5; 3 SOLUTION RESPIRATORY (INHALATION) at 20:13

## 2025-04-26 RX ADMIN — AZITHROMYCIN DIHYDRATE 500 MG: 250 TABLET, FILM COATED ORAL at 20:55

## 2025-04-26 RX ADMIN — ROPINIROLE HYDROCHLORIDE 0.5 MG: 0.25 TABLET, FILM COATED ORAL at 21:05

## 2025-04-26 RX ADMIN — Medication 3 L/MIN: at 18:34

## 2025-04-26 SDOH — SOCIAL STABILITY: SOCIAL INSECURITY: HAS ANYONE EVER THREATENED TO HURT YOUR FAMILY OR YOUR PETS?: NO

## 2025-04-26 SDOH — SOCIAL STABILITY: SOCIAL INSECURITY: DOES ANYONE TRY TO KEEP YOU FROM HAVING/CONTACTING OTHER FRIENDS OR DOING THINGS OUTSIDE YOUR HOME?: NO

## 2025-04-26 SDOH — SOCIAL STABILITY: SOCIAL INSECURITY: ABUSE: ADULT

## 2025-04-26 SDOH — SOCIAL STABILITY: SOCIAL INSECURITY
WITHIN THE LAST YEAR, HAVE YOU BEEN KICKED, HIT, SLAPPED, OR OTHERWISE PHYSICALLY HURT BY YOUR PARTNER OR EX-PARTNER?: NO

## 2025-04-26 SDOH — SOCIAL STABILITY: SOCIAL INSECURITY: WITHIN THE LAST YEAR, HAVE YOU BEEN AFRAID OF YOUR PARTNER OR EX-PARTNER?: NO

## 2025-04-26 SDOH — SOCIAL STABILITY: SOCIAL INSECURITY
WITHIN THE LAST YEAR, HAVE YOU BEEN RAPED OR FORCED TO HAVE ANY KIND OF SEXUAL ACTIVITY BY YOUR PARTNER OR EX-PARTNER?: NO

## 2025-04-26 SDOH — SOCIAL STABILITY: SOCIAL INSECURITY: DO YOU FEEL UNSAFE GOING BACK TO THE PLACE WHERE YOU ARE LIVING?: NO

## 2025-04-26 SDOH — SOCIAL STABILITY: SOCIAL INSECURITY: WITHIN THE LAST YEAR, HAVE YOU BEEN HUMILIATED OR EMOTIONALLY ABUSED IN OTHER WAYS BY YOUR PARTNER OR EX-PARTNER?: NO

## 2025-04-26 SDOH — ECONOMIC STABILITY: HOUSING INSECURITY: AT ANY TIME IN THE PAST 12 MONTHS, WERE YOU HOMELESS OR LIVING IN A SHELTER (INCLUDING NOW)?: NO

## 2025-04-26 SDOH — ECONOMIC STABILITY: HOUSING INSECURITY: IN THE LAST 12 MONTHS, WAS THERE A TIME WHEN YOU WERE NOT ABLE TO PAY THE MORTGAGE OR RENT ON TIME?: YES

## 2025-04-26 SDOH — ECONOMIC STABILITY: HOUSING INSECURITY: IN THE PAST 12 MONTHS, HOW MANY TIMES HAVE YOU MOVED WHERE YOU WERE LIVING?: 1

## 2025-04-26 SDOH — ECONOMIC STABILITY: INCOME INSECURITY: IN THE PAST 12 MONTHS HAS THE ELECTRIC, GAS, OIL, OR WATER COMPANY THREATENED TO SHUT OFF SERVICES IN YOUR HOME?: NO

## 2025-04-26 SDOH — SOCIAL STABILITY: SOCIAL INSECURITY: ARE YOU OR HAVE YOU BEEN THREATENED OR ABUSED PHYSICALLY, EMOTIONALLY, OR SEXUALLY BY ANYONE?: NO

## 2025-04-26 SDOH — ECONOMIC STABILITY: FOOD INSECURITY: HOW HARD IS IT FOR YOU TO PAY FOR THE VERY BASICS LIKE FOOD, HOUSING, MEDICAL CARE, AND HEATING?: SOMEWHAT HARD

## 2025-04-26 SDOH — ECONOMIC STABILITY: FOOD INSECURITY: WITHIN THE PAST 12 MONTHS, THE FOOD YOU BOUGHT JUST DIDN'T LAST AND YOU DIDN'T HAVE MONEY TO GET MORE.: NEVER TRUE

## 2025-04-26 SDOH — SOCIAL STABILITY: SOCIAL INSECURITY: HAVE YOU HAD THOUGHTS OF HARMING ANYONE ELSE?: NO

## 2025-04-26 SDOH — ECONOMIC STABILITY: FOOD INSECURITY: WITHIN THE PAST 12 MONTHS, YOU WORRIED THAT YOUR FOOD WOULD RUN OUT BEFORE YOU GOT THE MONEY TO BUY MORE.: NEVER TRUE

## 2025-04-26 SDOH — SOCIAL STABILITY: SOCIAL INSECURITY: DO YOU FEEL ANYONE HAS EXPLOITED OR TAKEN ADVANTAGE OF YOU FINANCIALLY OR OF YOUR PERSONAL PROPERTY?: NO

## 2025-04-26 SDOH — SOCIAL STABILITY: SOCIAL INSECURITY: ARE THERE ANY APPARENT SIGNS OF INJURIES/BEHAVIORS THAT COULD BE RELATED TO ABUSE/NEGLECT?: NO

## 2025-04-26 SDOH — SOCIAL STABILITY: SOCIAL INSECURITY: WERE YOU ABLE TO COMPLETE ALL THE BEHAVIORAL HEALTH SCREENINGS?: YES

## 2025-04-26 SDOH — ECONOMIC STABILITY: TRANSPORTATION INSECURITY: IN THE PAST 12 MONTHS, HAS LACK OF TRANSPORTATION KEPT YOU FROM MEDICAL APPOINTMENTS OR FROM GETTING MEDICATIONS?: NO

## 2025-04-26 ASSESSMENT — COGNITIVE AND FUNCTIONAL STATUS - GENERAL
HELP NEEDED FOR BATHING: A LITTLE
MOBILITY SCORE: 24
TOILETING: A LITTLE
CLIMB 3 TO 5 STEPS WITH RAILING: A LITTLE
DAILY ACTIVITIY SCORE: 21
HELP NEEDED FOR BATHING: A LITTLE
MOBILITY SCORE: 22
DAILY ACTIVITIY SCORE: 21
WALKING IN HOSPITAL ROOM: A LITTLE
DRESSING REGULAR LOWER BODY CLOTHING: A LITTLE
DRESSING REGULAR LOWER BODY CLOTHING: A LITTLE
TOILETING: A LITTLE
PATIENT BASELINE BEDBOUND: NO

## 2025-04-26 ASSESSMENT — PAIN - FUNCTIONAL ASSESSMENT
PAIN_FUNCTIONAL_ASSESSMENT: 0-10

## 2025-04-26 ASSESSMENT — ACTIVITIES OF DAILY LIVING (ADL)
GROOMING: INDEPENDENT
DRESSING YOURSELF: INDEPENDENT
LACK_OF_TRANSPORTATION: NO
ASSISTIVE_DEVICE: CANE;WALKER;OTHER (COMMENT)
HEARING - RIGHT EAR: FUNCTIONAL
TOILETING: NEEDS ASSISTANCE
FEEDING YOURSELF: INDEPENDENT
LACK_OF_TRANSPORTATION: NO
ADEQUATE_TO_COMPLETE_ADL: YES
BATHING: NEEDS ASSISTANCE
WALKS IN HOME: INDEPENDENT
HEARING - LEFT EAR: FUNCTIONAL
PATIENT'S MEMORY ADEQUATE TO SAFELY COMPLETE DAILY ACTIVITIES?: YES
JUDGMENT_ADEQUATE_SAFELY_COMPLETE_DAILY_ACTIVITIES: YES

## 2025-04-26 ASSESSMENT — LIFESTYLE VARIABLES
AUDIT-C TOTAL SCORE: 2
SKIP TO QUESTIONS 9-10: 1
HOW OFTEN DO YOU HAVE A DRINK CONTAINING ALCOHOL: 2-4 TIMES A MONTH
HOW OFTEN DO YOU HAVE 6 OR MORE DRINKS ON ONE OCCASION: NEVER
HOW MANY STANDARD DRINKS CONTAINING ALCOHOL DO YOU HAVE ON A TYPICAL DAY: 1 OR 2
AUDIT-C TOTAL SCORE: 2

## 2025-04-26 ASSESSMENT — PAIN SCALES - GENERAL
PAINLEVEL_OUTOF10: 6
PAINLEVEL_OUTOF10: 5 - MODERATE PAIN
PAINLEVEL_OUTOF10: 0 - NO PAIN

## 2025-04-26 ASSESSMENT — PAIN DESCRIPTION - LOCATION
LOCATION: BACK
LOCATION: BACK

## 2025-04-26 ASSESSMENT — PATIENT HEALTH QUESTIONNAIRE - PHQ9
1. LITTLE INTEREST OR PLEASURE IN DOING THINGS: NOT AT ALL
2. FEELING DOWN, DEPRESSED OR HOPELESS: SEVERAL DAYS
SUM OF ALL RESPONSES TO PHQ9 QUESTIONS 1 & 2: 1

## 2025-04-26 NOTE — ED PROVIDER NOTES
HPI   Chief Complaint   Patient presents with    Cough     Productive cough x's one week    Shortness of Breath       Patient is a 69-year-old female with history of asthma COPD presents to the ED for shortness of breath x 1 week.  Patient endorses chest congestion and cough that is nonproductive.  States the cough started a week ago as well.  Patient endorses chest pain  is right sided. Dyspnea on exertion and at rest.  Patient denies any history of congestive heart failure, MIs, blood clots, recent travel, surgery.  Patient states she tried her nebulizer at home and did 4 treatments with no improvement which is why she came in today.  Patient denies any fever chills nausea vomiting abdominal pain diarrhea constipation.  Patient smokes half a pack per day denies any alcohol or street drug abuse              Patient History   Medical History[1]  Surgical History[2]  Family History[3]  Social History[4]    Physical Exam   ED Triage Vitals [04/26/25 1745]   Temperature Heart Rate Respirations BP   36.7 °C (98 °F) 88 20 115/62      Pulse Ox Temp src Heart Rate Source Patient Position   95 % -- -- --      BP Location FiO2 (%)     -- --       Physical Exam  HENT:      Head: Normocephalic.   Cardiovascular:      Rate and Rhythm: Normal rate and regular rhythm.      Pulses: Normal pulses.   Pulmonary:      Effort: Pulmonary effort is normal.      Breath sounds: Decreased breath sounds and wheezing present.   Musculoskeletal:      Right lower leg: No edema.      Left lower leg: No edema.   Neurological:      General: No focal deficit present.      Mental Status: She is alert and oriented to person, place, and time.           ED Course & MDM   ED Course as of 04/26/25 1935   Sat Apr 26, 2025 1816 EKG performed at 1800 normal sinus rhythm normal axis no acute signs of ischemia ventricular 85 bpm []   1832 87 % on Ra placed on o2 []      ED Course User Index  [] Corrine Cassidy PA-C         Diagnoses as of  04/26/25 1935   Acute bronchitis, unspecified organism   COPD exacerbation (Multi)   COPD with acute exacerbation (Multi)   Hypoxia   Shortness of breath                 No data recorded                                 Medical Decision Making  Medical Decision Making:  Patient presented as described in HPI. Patient case including ROS, PE, and treatment and plan discussed with ED attending if attached as cosigner. Due to patients presentation orders completed include as documented.  Patient presents to the ED for shortness of breath x 1 week that is progressed to worsen.  Patient has tried for nebulizers at home with no improvement.   patient is nontoxic-appearing abdomen is soft and nontender no peripheral edema lung sounds are diminished with wheezing.  Patient is refusing DuoNeb treatments here.  Given Solu-Medrol.  Pending labs and imaging.  Patient dropped to 87% on room air placed on 3 L of oxygen.  Labs are unremarkable.  Patient states she will do the DuoNeb treatment.  Still needing oxygen.  Still feeling short of breath.  X-ray of the chest is negative.  Patient educated his findings needing admission patient is agreeable to plan.  I spoke with the hospitalist who assessed patient.  Patient delvin stable pending admission.        Patient care discussed with: N/A  Social Determinants affecting care: N/A    Final diagnosis and disposition as below.  See CI    Hospitalize. I discussed the differential; results and admit plan with the patient and/or family/friend/caregiver if present.  I emphasized the importance of hospitalization need for re-evaluation/continued monitoring/interventions.. They agreed that if they feel their condition is worsening or if they have any other concern they should alert staff immediately for further assistance. I gave the patient an opportunity to ask all questions they had and answered all of them accordingly. The patient and/or family/friend/caregiver expressed understanding  verbally and that they would comply.        Disposition:  admit    Hospitalize to _ floor under  _ per their orders. Discussed findings and treatment done here in ED with admitting physician. It would be a risk to discharge the patient in their condition due to possibility of deterioration in their condition and the need for urgent interventions.    This note has been transcribed using voice recognition and may contain grammatical errors, misplaced words, incorrect words, incorrect phrases or other errors.        XR chest 1 view   Final Result   No acute cardiopulmonary process.        MACRO:   None        Signed by: Archie Oh 4/26/2025 7:07 PM   Dictation workstation:   [id number]         Labs Reviewed   CBC WITH AUTO DIFFERENTIAL - Abnormal       Result Value    WBC 10.0      nRBC 0.0      RBC 5.23 (*)     Hemoglobin 15.5      Hematocrit 47.9 (*)     MCV 92      MCH 29.6      MCHC 32.4      RDW 12.9      Platelets 258      Neutrophils % 53.3      Immature Granulocytes %, Automated 0.2      Lymphocytes % 30.1      Monocytes % 8.1      Eosinophils % 7.1      Basophils % 1.2      Neutrophils Absolute 5.32      Immature Granulocytes Absolute, Automated 0.02      Lymphocytes Absolute 3.00      Monocytes Absolute 0.81      Eosinophils Absolute 0.71 (*)     Basophils Absolute 0.12 (*)    COMPREHENSIVE METABOLIC PANEL - Abnormal    Glucose 217 (*)     Sodium 136      Potassium 4.2      Chloride 99      Bicarbonate 28      Anion Gap 13      Urea Nitrogen 10      Creatinine 0.66      eGFR >90      Calcium 10.4 (*)     Albumin 4.1      Alkaline Phosphatase 115      Total Protein 7.9      AST 13      Bilirubin, Total 0.6      ALT 13     MAGNESIUM - Normal    Magnesium 1.85     B-TYPE NATRIURETIC PEPTIDE - Normal    BNP 33      Narrative:        <100 pg/mL - Heart failure unlikely  100-299 pg/mL - Intermediate probability of acute heart                  failure exacerbation. Correlate with clinical                   context and patient history.    >=300 pg/mL - Heart Failure likely. Correlate with clinical                  context and patient history.    BNP testing is performed using different testing methodology at [facility] (formerly [facility]) than at other [facility]. Direct result comparisons should only be made within the same method.      SARS-COV-2, INFLUENZA A/B AND RSV PCR - Normal    Coronavirus 2019, PCR Not Detected      Flu A Result Not Detected      Flu B Result Not Detected      RSV PCR Not Detected      Narrative:     This assay is an FDA-cleared, in vitro diagnostic nucleic acid amplification test for the qualitative detection and differentiation of SARS CoV-2/ Influenza A/B/ RSV from nasopharyngeal specimens collected from individuals with signs and symptoms of respiratory tract infections, and has been validated for use at [facility]. Negative results do not preclude COVID-19/ Influenza A/B/ RSV infections and should not be used as the sole basis for diagnosis, treatment, or other management decisions. Testing for SARS CoV-2 is recommended only for patients who meet current clinical and/or epidemiological criteria defined by federal, state, or local public health directives.   SERIAL TROPONIN-INITIAL - Normal    Troponin I, High Sensitivity 3      Narrative:     Less than 99th percentile of normal range cutoff-  Female and children under 18 years old <14 ng/L; Male <21 ng/L: Negative  Repeat testing should be performed if clinically indicated.     Female and children under 18 years old 14-50 ng/L; Male 21-50 ng/L:  Consistent with possible cardiac damage and possible increased clinical   risk. Serial measurements may help to assess extent of myocardial damage.     >50 ng/L: Consistent with cardiac damage, increased clinical risk and  myocardial infarction. Serial measurements may help assess extent of   myocardial damage.      NOTE: Children less than 1 year old may have higher  baseline troponin   levels and results should be interpreted in conjunction with the overall   clinical context.     NOTE: Troponin I testing is performed using a different   testing methodology at [facility] than at other   [facility]. Direct result comparisons should only   be made within the same method.   SERIAL TROPONIN, 1 HOUR - Normal    Troponin I, High Sensitivity 3      Narrative:     Less than 99th percentile of normal range cutoff-  Female and children under 18 years old <14 ng/L; Male <21 ng/L: Negative  Repeat testing should be performed if clinically indicated.     Female and children under 18 years old 14-50 ng/L; Male 21-50 ng/L:  Consistent with possible cardiac damage and possible increased clinical   risk. Serial measurements may help to assess extent of myocardial damage.     >50 ng/L: Consistent with cardiac damage, increased clinical risk and  myocardial infarction. Serial measurements may help assess extent of   myocardial damage.      NOTE: Children less than 1 year old may have higher baseline troponin   levels and results should be interpreted in conjunction with the overall   clinical context.     NOTE: Troponin I testing is performed using a different   testing methodology at [facility] than at other   [facility]. Direct result comparisons should only   be made within the same method.   TROPONIN SERIES- (INITIAL, 1 HR)    Narrative:     The following orders were created for panel order Troponin I Series, High Sensitivity (0, 1 HR).  Procedure                               Abnormality         Status                     ---------                               -----------         ------                     Troponin I, High Sensiti...[[id number]]  Normal              Final result               Troponin, High Sensitivi...[[id number]]  Normal              Final result                 Please view results for these tests on the individual orders.         Procedure  Procedures       [1]   Past Medical History:  Diagnosis Date    COPD (chronic obstructive pulmonary disease) (Multi)     Diabetes mellitus (Multi)     Esophagitis     Gastroenteritis     Other conditions influencing health status     Disc herniation    Pneumonia 03/25/2023    Proteinuria, unspecified     Proteinuria    Stroke (Multi)     Yeast infection 03/25/2023   [2]   Past Surgical History:  Procedure Laterality Date    CT ANGIO NECK  8/24/2023    CT NECK ANGIO W AND WO IV CONTRAST 8/24/2023 [facility]    CT HEAD ANGIO W AND WO IV CONTRAST  8/24/2023    CT HEAD ANGIO W AND WO IV CONTRAST 8/24/2023 [facility]    MR HEAD ANGIO WO IV CONTRAST  03/11/2014    MR HEAD ANGIO WO IV CONTRAST 3/11/2014 [facility]    MR HEAD ANGIO WO IV CONTRAST  04/19/2021    MR HEAD ANGIO WO IV CONTRAST 4/19/2021 [facility]    MR HEAD ANGIO WO IV CONTRAST  03/06/2023    MR HEAD ANGIO WO IV CONTRAST [facility]    MR HEAD ANGIO WO IV CONTRAST  5/26/2023    MR HEAD ANGIO WO IV CONTRAST 5/26/2023 [facility]    MR NECK ANGIO WO IV CONTRAST  03/11/2014    MR NECK ANGIO WO IV CONTRAST 3/11/2014 [facility]    MR NECK ANGIO WO IV CONTRAST  04/19/2021    MR NECK ANGIO WO IV CONTRAST 4/19/2021 [facility]    MR NECK ANGIO WO IV CONTRAST  03/06/2023    MR NECK ANGIO WO IV CONTRAST GEN MRI    MR NECK ANGIO WO IV CONTRAST  5/26/2023    MR NECK ANGIO WO IV CONTRAST 5/26/2023 GEN MRI    OTHER SURGICAL HISTORY      excision of multiple cysts   [3]   Family History  Problem Relation Name Age of Onset    Aneurysm Mother      Lung cancer Father     [4]   Social History  Tobacco Use    Smoking status: Every Day     Current packs/day: 0.50     Types: Cigarettes    Smokeless tobacco: Never   Vaping Use    Vaping status: Never Used   Substance Use Topics    Alcohol use: Yes     Alcohol/week: 1.0 standard drink of alcohol     Types: 1 Cans of beer per week     Comment: occasional    Drug use: Yes     Types: Marijuana     Comment:  daily use        Corrine Cassidy PA-C  04/26/25 1937

## 2025-04-26 NOTE — ED TRIAGE NOTES
Pt to ed from home, transported by son.  C/O cough for the last week, chest congestion worsening now feels SOB.  Strong smell of cigarettes on patient admits to 1/2 ppd.  No other complaints or symptoms aside from chronic back pain per patient

## 2025-04-27 VITALS
RESPIRATION RATE: 18 BRPM | TEMPERATURE: 96.6 F | SYSTOLIC BLOOD PRESSURE: 112 MMHG | DIASTOLIC BLOOD PRESSURE: 60 MMHG | OXYGEN SATURATION: 93 % | HEART RATE: 87 BPM | HEIGHT: 64 IN | BODY MASS INDEX: 25.78 KG/M2 | WEIGHT: 151.01 LBS

## 2025-04-27 PROBLEM — E11.42 DM TYPE 2 WITH DIABETIC PERIPHERAL NEUROPATHY: Status: ACTIVE | Noted: 2023-03-25

## 2025-04-27 PROBLEM — I50.32 CHRONIC DIASTOLIC HEART FAILURE: Status: ACTIVE | Noted: 2025-04-27

## 2025-04-27 PROBLEM — U07.0 E-CIGARETTE OR VAPING PRODUCT USE ASSOCIATED LUNG INJURY (EVALI): Status: ACTIVE | Noted: 2025-04-27

## 2025-04-27 LAB
GLUCOSE BLD MANUAL STRIP-MCNC: 268 MG/DL (ref 74–99)
GLUCOSE BLD MANUAL STRIP-MCNC: 268 MG/DL (ref 74–99)
GLUCOSE BLD MANUAL STRIP-MCNC: 330 MG/DL (ref 74–99)
GLUCOSE BLD MANUAL STRIP-MCNC: 371 MG/DL (ref 74–99)
GLUCOSE BLD MANUAL STRIP-MCNC: 421 MG/DL (ref 74–99)
HOLD SPECIMEN: NORMAL

## 2025-04-27 PROCEDURE — 2500000001 HC RX 250 WO HCPCS SELF ADMINISTERED DRUGS (ALT 637 FOR MEDICARE OP): Mod: IPSPLIT | Performed by: HOSPITALIST

## 2025-04-27 PROCEDURE — 82947 ASSAY GLUCOSE BLOOD QUANT: CPT | Mod: IPSPLIT

## 2025-04-27 PROCEDURE — 2500000002 HC RX 250 W HCPCS SELF ADMINISTERED DRUGS (ALT 637 FOR MEDICARE OP, ALT 636 FOR OP/ED): Mod: IPSPLIT | Performed by: HOSPITALIST

## 2025-04-27 PROCEDURE — 94760 N-INVAS EAR/PLS OXIMETRY 1: CPT | Mod: IPSPLIT

## 2025-04-27 PROCEDURE — 94640 AIRWAY INHALATION TREATMENT: CPT | Mod: IPSPLIT

## 2025-04-27 PROCEDURE — 87070 CULTURE OTHR SPECIMN AEROBIC: CPT | Mod: GENLAB | Performed by: NURSE PRACTITIONER

## 2025-04-27 PROCEDURE — 2500000001 HC RX 250 WO HCPCS SELF ADMINISTERED DRUGS (ALT 637 FOR MEDICARE OP): Mod: IPSPLIT | Performed by: NURSE PRACTITIONER

## 2025-04-27 PROCEDURE — 2500000002 HC RX 250 W HCPCS SELF ADMINISTERED DRUGS (ALT 637 FOR MEDICARE OP, ALT 636 FOR OP/ED): Mod: IPSPLIT | Performed by: NURSE PRACTITIONER

## 2025-04-27 PROCEDURE — 1100000001 HC PRIVATE ROOM DAILY: Mod: IPSPLIT

## 2025-04-27 PROCEDURE — 2500000005 HC RX 250 GENERAL PHARMACY W/O HCPCS: Mod: IPSPLIT

## 2025-04-27 PROCEDURE — 2500000004 HC RX 250 GENERAL PHARMACY W/ HCPCS (ALT 636 FOR OP/ED): Mod: JZ,IPSPLIT | Performed by: HOSPITALIST

## 2025-04-27 RX ORDER — OXYCODONE HYDROCHLORIDE 5 MG/1
2.5 TABLET ORAL EVERY 6 HOURS PRN
Refills: 0 | Status: DISCONTINUED | OUTPATIENT
Start: 2025-04-27 | End: 2025-04-28

## 2025-04-27 RX ORDER — OXYCODONE HYDROCHLORIDE 5 MG/1
5 TABLET ORAL EVERY 6 HOURS PRN
Refills: 0 | Status: DISCONTINUED | OUTPATIENT
Start: 2025-04-27 | End: 2025-04-28

## 2025-04-27 RX ORDER — PROCHLORPERAZINE EDISYLATE 5 MG/ML
10 INJECTION INTRAMUSCULAR; INTRAVENOUS ONCE AS NEEDED
Status: COMPLETED | OUTPATIENT
Start: 2025-04-27 | End: 2025-04-27

## 2025-04-27 RX ORDER — PROCHLORPERAZINE MALEATE 10 MG
10 TABLET ORAL ONCE AS NEEDED
Status: COMPLETED | OUTPATIENT
Start: 2025-04-27 | End: 2025-04-27

## 2025-04-27 RX ORDER — INSULIN LISPRO 100 [IU]/ML
0-20 INJECTION, SOLUTION INTRAVENOUS; SUBCUTANEOUS
Status: DISCONTINUED | OUTPATIENT
Start: 2025-04-27 | End: 2025-04-29 | Stop reason: HOSPADM

## 2025-04-27 RX ORDER — INSULIN LISPRO 100 [IU]/ML
10 INJECTION, SOLUTION INTRAVENOUS; SUBCUTANEOUS ONCE
Status: COMPLETED | OUTPATIENT
Start: 2025-04-27 | End: 2025-04-27

## 2025-04-27 RX ADMIN — METHYLPREDNISOLONE SODIUM SUCCINATE 40 MG: 40 INJECTION, POWDER, FOR SOLUTION INTRAMUSCULAR; INTRAVENOUS at 16:40

## 2025-04-27 RX ADMIN — Medication 10 MG: at 20:16

## 2025-04-27 RX ADMIN — IPRATROPIUM BROMIDE AND ALBUTEROL SULFATE 3 ML: .5; 3 SOLUTION RESPIRATORY (INHALATION) at 15:07

## 2025-04-27 RX ADMIN — INSULIN LISPRO 20 UNITS: 100 INJECTION, SOLUTION INTRAVENOUS; SUBCUTANEOUS at 11:55

## 2025-04-27 RX ADMIN — METHYLPREDNISOLONE SODIUM SUCCINATE 40 MG: 40 INJECTION, POWDER, FOR SOLUTION INTRAMUSCULAR; INTRAVENOUS at 00:13

## 2025-04-27 RX ADMIN — INSULIN LISPRO 10 UNITS: 100 INJECTION, SOLUTION INTRAVENOUS; SUBCUTANEOUS at 06:32

## 2025-04-27 RX ADMIN — ASPIRIN 81 MG CHEWABLE TABLET 81 MG: 81 TABLET CHEWABLE at 09:06

## 2025-04-27 RX ADMIN — MECLIZINE HYDROCHLORIDE 25 MG: 12.5 TABLET ORAL at 09:07

## 2025-04-27 RX ADMIN — PANTOPRAZOLE SODIUM 40 MG: 40 TABLET, DELAYED RELEASE ORAL at 06:32

## 2025-04-27 RX ADMIN — INSULIN LISPRO 12 UNITS: 100 INJECTION, SOLUTION INTRAVENOUS; SUBCUTANEOUS at 16:44

## 2025-04-27 RX ADMIN — IPRATROPIUM BROMIDE AND ALBUTEROL SULFATE 3 ML: .5; 3 SOLUTION RESPIRATORY (INHALATION) at 09:17

## 2025-04-27 RX ADMIN — IPRATROPIUM BROMIDE AND ALBUTEROL SULFATE 3 ML: .5; 3 SOLUTION RESPIRATORY (INHALATION) at 22:59

## 2025-04-27 RX ADMIN — METHYLPREDNISOLONE SODIUM SUCCINATE 40 MG: 40 INJECTION, POWDER, FOR SOLUTION INTRAMUSCULAR; INTRAVENOUS at 09:06

## 2025-04-27 RX ADMIN — INSULIN GLARGINE 30 UNITS: 100 INJECTION, SOLUTION SUBCUTANEOUS at 21:05

## 2025-04-27 RX ADMIN — GUAIFENESIN 200 MG: 200 SOLUTION ORAL at 09:06

## 2025-04-27 RX ADMIN — OXYCODONE 5 MG: 5 TABLET ORAL at 12:01

## 2025-04-27 RX ADMIN — ATORVASTATIN CALCIUM 20 MG: 10 TABLET, FILM COATED ORAL at 16:39

## 2025-04-27 RX ADMIN — ROPINIROLE HYDROCHLORIDE 0.5 MG: 0.25 TABLET, FILM COATED ORAL at 20:16

## 2025-04-27 RX ADMIN — ENOXAPARIN SODIUM 40 MG: 40 INJECTION SUBCUTANEOUS at 09:06

## 2025-04-27 RX ADMIN — SUCRALFATE 1 G: 1 TABLET ORAL at 16:39

## 2025-04-27 RX ADMIN — GUAIFENESIN 200 MG: 200 SOLUTION ORAL at 15:01

## 2025-04-27 RX ADMIN — INSULIN LISPRO 20 UNITS: 100 INJECTION, SOLUTION INTRAVENOUS; SUBCUTANEOUS at 06:32

## 2025-04-27 RX ADMIN — PROCHLORPERAZINE MALEATE 10 MG: 10 TABLET ORAL at 20:16

## 2025-04-27 RX ADMIN — SUCRALFATE 1 G: 1 TABLET ORAL at 06:32

## 2025-04-27 RX ADMIN — ESCITALOPRAM OXALATE 10 MG: 10 TABLET ORAL at 09:06

## 2025-04-27 RX ADMIN — Medication 2 L/MIN: at 22:59

## 2025-04-27 RX ADMIN — ONDANSETRON 4 MG: 2 INJECTION INTRAMUSCULAR; INTRAVENOUS at 15:10

## 2025-04-27 ASSESSMENT — PAIN SCALES - GENERAL
PAINLEVEL_OUTOF10: 0 - NO PAIN
PAINLEVEL_OUTOF10: 5 - MODERATE PAIN
PAINLEVEL_OUTOF10: 0 - NO PAIN
PAINLEVEL_OUTOF10: 5 - MODERATE PAIN
PAINLEVEL_OUTOF10: 0 - NO PAIN
PAINLEVEL_OUTOF10: 9
PAINLEVEL_OUTOF10: 7

## 2025-04-27 ASSESSMENT — ENCOUNTER SYMPTOMS
CHEST TIGHTNESS: 1
CARDIOVASCULAR NEGATIVE: 1
NEUROLOGICAL NEGATIVE: 1
SHORTNESS OF BREATH: 1
COUGH: 1
GASTROINTESTINAL NEGATIVE: 1
DIAPHORESIS: 0
EYES NEGATIVE: 1
FEVER: 0
MUSCULOSKELETAL NEGATIVE: 1
PSYCHIATRIC NEGATIVE: 1
CHILLS: 1
ACTIVITY CHANGE: 1

## 2025-04-27 ASSESSMENT — COGNITIVE AND FUNCTIONAL STATUS - GENERAL
MOBILITY SCORE: 19
CLIMB 3 TO 5 STEPS WITH RAILING: A LOT
MOVING TO AND FROM BED TO CHAIR: A LITTLE
WALKING IN HOSPITAL ROOM: A LOT

## 2025-04-27 ASSESSMENT — PAIN - FUNCTIONAL ASSESSMENT
PAIN_FUNCTIONAL_ASSESSMENT: 0-10
PAIN_FUNCTIONAL_ASSESSMENT: UNABLE TO SELF-REPORT
PAIN_FUNCTIONAL_ASSESSMENT: 0-10
PAIN_FUNCTIONAL_ASSESSMENT: 0-10

## 2025-04-27 NOTE — NURSING NOTE
Patient rang call light to get [place] [facility] [place]. Patient diaphoretic. Gait unsteady. Patient c/o dizziness. Patient disoriented to year and month. Patient having expressive aphasia. Blood glucose checked and 330. TR=724/77. Patient verbalized that she has right-sided weakness, and is unable to read, write, or drive since she had a stroke. Patient assisted back to bed. Bed alarm activated. Call light within reach.

## 2025-04-27 NOTE — NURSING NOTE
0730 Assumed nursing care for patient. Patient sleeping at this time, on 3 liters NC.     0830 Patient up to chair    0915  Patient back to bed    1150 Patient up chair     1330 Patient back to bed.     1500 Report given to Coreen AZUL    1505 Patient had a coughing fit, had a small emesis. Patient was given cough syrup, Zofran and a breathing treatment.    1625 Patient transferred to med surg 230

## 2025-04-27 NOTE — CARE PLAN
The patient's goals for the shift include Get something to eat and something stronger than Tylenol for pain.    The clinical goals for the shift include monitor oxygen saturation and patient's work of breathing, collect sputum sample if able, and promote a quiet, calm environment.

## 2025-04-27 NOTE — NURSING NOTE
Patient admitted from ED via wheelchair for COPD exacerbation. Patient ambulated to bed with minimal assistance. Patient oriented to room and unit. Patient sitting on edge of bed. Call light within reach.

## 2025-04-27 NOTE — CARE PLAN
The patient's goals for the shift include Get something to eat and something stronger than Tylenol for pain.    The clinical goals for the shift include monitor oxygen saturation and patient's work of breathing, collect sputum sample if able, and promote a quiet, calm environment.    Patient disoriented during the night, gait unsteady, diaphoretic, hyperglycemic, and c/o dizziness. VS stable. Patient verbalized she has vertigo and has residual from her previous stroke. Patient easily reoriented. Patient c/o back pain last night, but denies pain this morning. Patient admitted to having a vape in her bag with medical marijuana in it. Patient educated to not use it while in the hospital.

## 2025-04-27 NOTE — H&P
History Of Present Illness  Lenora Ames is a 69 y.o. female presenting with a complaint of SOB and cough. Her symptoms started about 1 week ago with chest congestion, cough, and SOB. She has right sided chest pain and dyspnea with exertion and at rest. She tried her nebulizer at home and did 4 treatment with no relief of symptoms. She denies fever, chills, N/V/D/C. She admits to smoking 1/2 pack of cigarettes per day.    In the ED:  VS: T36.7 HR 88; R 20; /62; SpO2 95% on RA  Labs: Glu 217; Na 136; K 4.2; BuN 10; Cr 0.66; Ca 10.4; WBC 10.0; Hb 15.5; Hcrt47.0;   Radiology: CXR No acute cardiopulmonary process.  Medications: Solumedrol  Disposition: admitted to med/surg         Past Medical History  Medical History[1]    Surgical History  Surgical History[2]     Social History  She reports that she has been smoking cigarettes. She has never used smokeless tobacco. She reports current alcohol use of about 1.0 standard drink of alcohol per week. She reports current drug use. Drug: Marijuana.    Family History  Family History[3]     Allergies  Patient has no known allergies.    Review of Systems   Constitutional:  Positive for activity change and chills. Negative for diaphoresis and fever.   HENT:  Positive for congestion.    Eyes: Negative.    Respiratory:  Positive for cough, chest tightness and shortness of breath.    Cardiovascular: Negative.    Gastrointestinal: Negative.    Genitourinary: Negative.    Musculoskeletal: Negative.    Skin: Negative.    Neurological: Negative.    Psychiatric/Behavioral: Negative.          Physical Exam  Vitals reviewed.   Constitutional:       Appearance: Normal appearance. She is normal weight.   HENT:      Head: Normocephalic and atraumatic.      Right Ear: External ear normal.      Left Ear: External ear normal.      Nose: Nose normal.      Mouth/Throat:      Mouth: Mucous membranes are moist.      Pharynx: Oropharynx is clear.   Eyes:      Conjunctiva/sclera:  "Conjunctivae normal.      Pupils: Pupils are equal, round, and reactive to light.   Cardiovascular:      Rate and Rhythm: Normal rate and regular rhythm.      Pulses: Normal pulses.      Heart sounds: Normal heart sounds.   Pulmonary:      Effort: Pulmonary effort is normal.      Breath sounds: Normal breath sounds.   Abdominal:      General: Bowel sounds are normal.      Palpations: Abdomen is soft.   Musculoskeletal:         General: Normal range of motion.      Cervical back: Normal range of motion and neck supple.   Skin:     General: Skin is warm and dry.   Neurological:      General: No focal deficit present.      Mental Status: She is alert and oriented to person, place, and time.   Psychiatric:         Mood and Affect: Mood normal.         Behavior: Behavior normal.          Last Recorded Vitals  Blood pressure 116/77, pulse 71, temperature 35.9 °C (96.6 °F), temperature source Temporal, resp. rate 21, height 1.626 m (5' 4\"), weight 68.5 kg (151 lb 0.2 oz), SpO2 97%.    Relevant Results    Scheduled medications  Scheduled Medications[4]  Continuous medications  Continuous Medications[5]  PRN medications  PRN Medications[6]    Results for orders placed or performed during the hospital encounter of 04/26/25 (from the past 24 hours)   CBC and Auto Differential   Result Value Ref Range    WBC 10.0 4.4 - 11.3 x10*3/uL    nRBC 0.0 0.0 - 0.0 /100 WBCs    RBC 5.23 (H) 4.00 - 5.20 x10*6/uL    Hemoglobin 15.5 12.0 - 16.0 g/dL    Hematocrit 47.9 (H) 36.0 - 46.0 %    MCV 92 80 - 100 fL    MCH 29.6 26.0 - 34.0 pg    MCHC 32.4 32.0 - 36.0 g/dL    RDW 12.9 11.5 - 14.5 %    Platelets 258 150 - 450 x10*3/uL    Neutrophils % 53.3 40.0 - 80.0 %    Immature Granulocytes %, Automated 0.2 0.0 - 0.9 %    Lymphocytes % 30.1 13.0 - 44.0 %    Monocytes % 8.1 2.0 - 10.0 %    Eosinophils % 7.1 0.0 - 6.0 %    Basophils % 1.2 0.0 - 2.0 %    Neutrophils Absolute 5.32 1.20 - 7.70 x10*3/uL    Immature Granulocytes Absolute, Automated 0.02 " 0.00 - 0.70 x10*3/uL    Lymphocytes Absolute 3.00 1.20 - 4.80 x10*3/uL    Monocytes Absolute 0.81 0.10 - 1.00 x10*3/uL    Eosinophils Absolute 0.71 (H) 0.00 - 0.70 x10*3/uL    Basophils Absolute 0.12 (H) 0.00 - 0.10 x10*3/uL   Comprehensive metabolic panel   Result Value Ref Range    Glucose 217 (H) 74 - 99 mg/dL    Sodium 136 136 - 145 mmol/L    Potassium 4.2 3.5 - 5.3 mmol/L    Chloride 99 98 - 107 mmol/L    Bicarbonate 28 21 - 32 mmol/L    Anion Gap 13 10 - 20 mmol/L    Urea Nitrogen 10 6 - 23 mg/dL    Creatinine 0.66 0.50 - 1.05 mg/dL    eGFR >90 >60 mL/min/1.73m*2    Calcium 10.4 (H) 8.6 - 10.3 mg/dL    Albumin 4.1 3.4 - 5.0 g/dL    Alkaline Phosphatase 115 33 - 136 U/L    Total Protein 7.9 6.4 - 8.2 g/dL    AST 13 9 - 39 U/L    Bilirubin, Total 0.6 0.0 - 1.2 mg/dL    ALT 13 7 - 45 U/L   Magnesium   Result Value Ref Range    Magnesium 1.85 1.60 - 2.40 mg/dL   B-Type Natriuretic Peptide   Result Value Ref Range    BNP 33 0 - 99 pg/mL   Troponin I, High Sensitivity, Initial   Result Value Ref Range    Troponin I, High Sensitivity 3 0 - 13 ng/L   Sars-CoV-2, Influenza A/B and RSV PCR   Result Value Ref Range    Coronavirus 2019, PCR Not Detected Not Detected    Flu A Result Not Detected Not Detected    Flu B Result Not Detected Not Detected    RSV PCR Not Detected Not Detected   Troponin, High Sensitivity, 1 Hour   Result Value Ref Range    Troponin I, High Sensitivity 3 0 - 13 ng/L   POCT GLUCOSE   Result Value Ref Range    POCT Glucose 249 (H) 74 - 99 mg/dL   POCT GLUCOSE   Result Value Ref Range    POCT Glucose 330 (H) 74 - 99 mg/dL   POCT GLUCOSE   Result Value Ref Range    POCT Glucose 421 (H) 74 - 99 mg/dL             Assessment & Plan  COPD with acute exacerbation (Multi)    DM type 2 with diabetic peripheral neuropathy    Acute respiratory failure with hypoxia    E-cigarette or vaping product use associated lung injury (EVALI)    Anxiety and depression    RLS (restless legs syndrome)    Primary  hypertension    Cigarette smoker    Cannabis abuse, uncomplicated    Gastroesophageal reflux disease without esophagitis    Dyslipidemia    Chronic diastolic heart failure      COPD exacerbation  Suspect EVALI  Polysubstance abuse (nicotine and marijuana)  Acute respiratory failure with hypoxia  - SpO2 87% on RA last night  - supplemental oxygen to keep SpO2 > 90%  - currently on 2lpm via NC at hs  - Vapes marijuana daily  - smokes 1/2 ppd of cigarettes  - started azithromycin 500 mg daily; day 1/5  - started duoneb q8h  - started solumedrol 40 mg daily  - started nicotine 21 mg/24h patch daily  - RT for bronchial hygiene      DM Type II with neuropathy   - SSI Lispro 0-20 scale  - hypoglycemic protocol  -  continue lantus 30 units hs   - Monitor blood sugar     Essential HTN  Dyslipidemia  Chronic Diastolic heart failure  -Echo (3/7/23): normal LVSF with an  60-65% with impaired relaxation pattern of LVDF  -BP elevated overnight, will monitor  -Continue aspirin 81 mg daily atorvastatin 20 mg daily,         Anxiety abd Depression  -Continue escitalopram 10 mg daily    RLS  - continue ropinirole 0.5 mg hs     Chronic pain  -Continue lidocaine 4% patch daily    Generalized Weakness  - PT/OT evaluation      PUD ppx  - continue pantoprazole 40 mg daily, sucralfate 1g BID    DVT ppx  - started enoxaparin 40 mg daily    Code status: Full     Disposition: Pt requires more than 2 inpatient days at this time    Seen and discussed with MD Radha Patel, APRN-CNP  Attending Attestation:    Patient was seen and examined face to face, history and physical was taken personally at bedside the APRN-CNP, was present for the whole duration of the exam who participated in the documentation of this note. I performed the medical decision-making components (assessment and plan of care). I have reviewed the documentation and verified the findings in the note as written with additions or exceptions as stated in  the body of this note.  He was sleeping when I saw her however she woke up was somewhat confused and slight confusion until the rest of the visit, denies having chest pain at this point no shortness of breath at rest no nausea vomiting.  She is still smoking.  On exam she does have diffuse rhonchi bilateral lungs, heart regular, abdomen soft bowel sounds are present, extremities no significant edema.  Patient with history of COPD current daily smoker admitted for COPD exacerbation continue with bronchodilators steroids and p.o. antibiotics.    Dr. Faraz Ortega MD  Internal Medicine        [1]   Past Medical History:  Diagnosis Date    Alexia     Ambulates with cane     Asthma     Chronic back pain     Cigarette smoker     COPD (chronic obstructive pulmonary disease) (Multi)     Diabetes mellitus (Multi)     Esophagitis     Fall     Gastroenteritis     Other conditions influencing health status     Disc herniation    Pneumonia 03/25/2023    Proteinuria, unspecified     Proteinuria    Stroke (Multi)     right-sided weakness    Vertigo     Yeast infection 03/25/2023   [2]   Past Surgical History:  Procedure Laterality Date    CT ANGIO NECK  8/24/2023    CT NECK ANGIO W AND WO IV CONTRAST 8/24/2023 [facility]    CT HEAD ANGIO W AND WO IV CONTRAST  8/24/2023    CT HEAD ANGIO W AND WO IV CONTRAST 8/24/2023 [facility]    MR HEAD ANGIO WO IV CONTRAST  03/11/2014    MR HEAD ANGIO WO IV CONTRAST 3/11/2014 [facility]    MR HEAD ANGIO WO IV CONTRAST  04/19/2021    MR HEAD ANGIO WO IV CONTRAST 4/19/2021 [facility]    MR HEAD ANGIO WO IV CONTRAST  03/06/2023    MR HEAD ANGIO WO IV CONTRAST [facility]    MR HEAD ANGIO WO IV CONTRAST  5/26/2023    MR HEAD ANGIO WO IV CONTRAST 5/26/2023 [facility]    MR NECK ANGIO WO IV CONTRAST  03/11/2014    MR NECK ANGIO WO IV CONTRAST 3/11/2014 [facility]    MR NECK ANGIO WO IV CONTRAST  04/19/2021    MR NECK ANGIO WO IV CONTRAST 4/19/2021 [facility]    MR NECK ANGIO WO IV  CONTRAST  03/06/2023    MR NECK ANGIO WO IV CONTRAST GEN MRI    MR NECK ANGIO WO IV CONTRAST  5/26/2023    MR NECK ANGIO WO IV CONTRAST 5/26/2023 [facility]    OTHER SURGICAL HISTORY      excision of multiple cysts   [3]   Family History  Problem Relation Name Age of Onset    Aneurysm Mother      Lung cancer Father      Lung cancer Sister      Ovarian cancer Sister     [4] aspirin, 81 mg, oral, Daily  atorvastatin, 20 mg, oral, Daily with evening meal  azithromycin, 500 mg, oral, q24h JERILYN  enoxaparin, 40 mg, subcutaneous, Daily  escitalopram, 10 mg, oral, Daily  insulin glargine, 30 Units, subcutaneous, Nightly  insulin lispro, 0-20 Units, subcutaneous, TID AC  ipratropium-albuteroL, 3 mL, nebulization, Once  ipratropium-albuteroL, 3 mL, nebulization, TID  lidocaine, 2 patch, transdermal, Daily  methylPREDNISolone sodium succinate (PF), 40 mg, intravenous, q8h  nicotine, 1 patch, transdermal, Daily  pantoprazole, 40 mg, oral, Daily before breakfast  pneumoc 20-hoda conj-dip cr(PF), 0.5 mL, intramuscular, During hospitalization  rOPINIRole, 0.5 mg, oral, Nightly  sucralfate, 1 g, oral, BID AC  [5]    [6] PRN medications: acetaminophen, albuterol, alum-mag hydroxide-simeth, benzocaine-menthol, cyclobenzaprine, dextrose, dextrose, glucagon, glucagon, guaiFENesin, hydrOXYzine HCL, meclizine, melatonin, ondansetron, oxyCODONE, oxygen, polyethylene glycol

## 2025-04-27 NOTE — CARE PLAN
The patient's goals for the shift include Get something to eat and something stronger than Tylenol for pain.    The clinical goals for the shift include Patient pulse ox will be maintained at 90% or greater on rrom air this shift 4/27/25 1900.    Transferred to room 230. Orientated to surroundings. Tolerating treatment plan.

## 2025-04-28 LAB
ANION GAP SERPL CALC-SCNC: 15 MMOL/L (ref 10–20)
ATRIAL RATE: 85 BPM
BUN SERPL-MCNC: 18 MG/DL (ref 6–23)
CALCIUM SERPL-MCNC: 9.6 MG/DL (ref 8.6–10.3)
CHLORIDE SERPL-SCNC: 97 MMOL/L (ref 98–107)
CO2 SERPL-SCNC: 26 MMOL/L (ref 21–32)
CREAT SERPL-MCNC: 0.75 MG/DL (ref 0.5–1.05)
EGFRCR SERPLBLD CKD-EPI 2021: 87 ML/MIN/1.73M*2
ERYTHROCYTE [DISTWIDTH] IN BLOOD BY AUTOMATED COUNT: 12.9 % (ref 11.5–14.5)
GLUCOSE BLD MANUAL STRIP-MCNC: 279 MG/DL (ref 74–99)
GLUCOSE BLD MANUAL STRIP-MCNC: 312 MG/DL (ref 74–99)
GLUCOSE BLD MANUAL STRIP-MCNC: 397 MG/DL (ref 74–99)
GLUCOSE BLD MANUAL STRIP-MCNC: 405 MG/DL (ref 74–99)
GLUCOSE SERPL-MCNC: 425 MG/DL (ref 74–99)
HCT VFR BLD AUTO: 44.2 % (ref 36–46)
HGB BLD-MCNC: 14.6 G/DL (ref 12–16)
MCH RBC QN AUTO: 29.9 PG (ref 26–34)
MCHC RBC AUTO-ENTMCNC: 33 G/DL (ref 32–36)
MCV RBC AUTO: 90 FL (ref 80–100)
NRBC BLD-RTO: 0 /100 WBCS (ref 0–0)
P AXIS: 78 DEGREES
P OFFSET: 194 MS
P ONSET: 147 MS
PLATELET # BLD AUTO: 227 X10*3/UL (ref 150–450)
POTASSIUM SERPL-SCNC: 4.6 MMOL/L (ref 3.5–5.3)
PR INTERVAL: 130 MS
Q ONSET: 212 MS
QRS COUNT: 14 BEATS
QRS DURATION: 80 MS
QT INTERVAL: 366 MS
QTC CALCULATION(BAZETT): 435 MS
QTC FREDERICIA: 411 MS
R AXIS: -53 DEGREES
RBC # BLD AUTO: 4.89 X10*6/UL (ref 4–5.2)
SODIUM SERPL-SCNC: 133 MMOL/L (ref 136–145)
T AXIS: 60 DEGREES
T OFFSET: 395 MS
VENTRICULAR RATE: 85 BPM
WBC # BLD AUTO: 16.4 X10*3/UL (ref 4.4–11.3)

## 2025-04-28 PROCEDURE — 2500000002 HC RX 250 W HCPCS SELF ADMINISTERED DRUGS (ALT 637 FOR MEDICARE OP, ALT 636 FOR OP/ED): Mod: IPSPLIT | Performed by: NURSE PRACTITIONER

## 2025-04-28 PROCEDURE — 99233 SBSQ HOSP IP/OBS HIGH 50: CPT

## 2025-04-28 PROCEDURE — 94640 AIRWAY INHALATION TREATMENT: CPT | Mod: IPSPLIT

## 2025-04-28 PROCEDURE — 2500000001 HC RX 250 WO HCPCS SELF ADMINISTERED DRUGS (ALT 637 FOR MEDICARE OP): Mod: IPSPLIT | Performed by: HOSPITALIST

## 2025-04-28 PROCEDURE — 80048 BASIC METABOLIC PNL TOTAL CA: CPT | Mod: IPSPLIT

## 2025-04-28 PROCEDURE — 82947 ASSAY GLUCOSE BLOOD QUANT: CPT | Mod: IPSPLIT

## 2025-04-28 PROCEDURE — 36415 COLL VENOUS BLD VENIPUNCTURE: CPT | Mod: IPSPLIT

## 2025-04-28 PROCEDURE — 1100000001 HC PRIVATE ROOM DAILY: Mod: IPSPLIT

## 2025-04-28 PROCEDURE — 85027 COMPLETE CBC AUTOMATED: CPT | Mod: IPSPLIT

## 2025-04-28 PROCEDURE — 2500000005 HC RX 250 GENERAL PHARMACY W/O HCPCS: Mod: IPSPLIT | Performed by: HOSPITALIST

## 2025-04-28 PROCEDURE — S4991 NICOTINE PATCH NONLEGEND: HCPCS | Mod: IPSPLIT | Performed by: HOSPITALIST

## 2025-04-28 PROCEDURE — 94760 N-INVAS EAR/PLS OXIMETRY 1: CPT | Mod: IPSPLIT

## 2025-04-28 PROCEDURE — 2500000004 HC RX 250 GENERAL PHARMACY W/ HCPCS (ALT 636 FOR OP/ED): Mod: JZ,IPSPLIT | Performed by: HOSPITALIST

## 2025-04-28 PROCEDURE — 2500000002 HC RX 250 W HCPCS SELF ADMINISTERED DRUGS (ALT 637 FOR MEDICARE OP, ALT 636 FOR OP/ED): Mod: IPSPLIT | Performed by: HOSPITALIST

## 2025-04-28 RX ADMIN — INSULIN LISPRO 20 UNITS: 100 INJECTION, SOLUTION INTRAVENOUS; SUBCUTANEOUS at 08:18

## 2025-04-28 RX ADMIN — ASPIRIN 81 MG CHEWABLE TABLET 81 MG: 81 TABLET CHEWABLE at 08:16

## 2025-04-28 RX ADMIN — Medication 10 MG: at 22:40

## 2025-04-28 RX ADMIN — ROPINIROLE HYDROCHLORIDE 0.5 MG: 0.25 TABLET, FILM COATED ORAL at 20:07

## 2025-04-28 RX ADMIN — INSULIN LISPRO 20 UNITS: 100 INJECTION, SOLUTION INTRAVENOUS; SUBCUTANEOUS at 11:20

## 2025-04-28 RX ADMIN — METHYLPREDNISOLONE SODIUM SUCCINATE 40 MG: 40 INJECTION, POWDER, FOR SOLUTION INTRAMUSCULAR; INTRAVENOUS at 08:16

## 2025-04-28 RX ADMIN — AZITHROMYCIN DIHYDRATE 500 MG: 250 TABLET, FILM COATED ORAL at 08:16

## 2025-04-28 RX ADMIN — IPRATROPIUM BROMIDE AND ALBUTEROL SULFATE 3 ML: .5; 3 SOLUTION RESPIRATORY (INHALATION) at 09:23

## 2025-04-28 RX ADMIN — SUCRALFATE 1 G: 1 TABLET ORAL at 06:10

## 2025-04-28 RX ADMIN — ENOXAPARIN SODIUM 40 MG: 40 INJECTION SUBCUTANEOUS at 08:16

## 2025-04-28 RX ADMIN — IPRATROPIUM BROMIDE AND ALBUTEROL SULFATE 3 ML: .5; 3 SOLUTION RESPIRATORY (INHALATION) at 21:24

## 2025-04-28 RX ADMIN — PANTOPRAZOLE SODIUM 40 MG: 40 TABLET, DELAYED RELEASE ORAL at 06:10

## 2025-04-28 RX ADMIN — METHYLPREDNISOLONE SODIUM SUCCINATE 40 MG: 40 INJECTION, POWDER, FOR SOLUTION INTRAMUSCULAR; INTRAVENOUS at 00:11

## 2025-04-28 RX ADMIN — LIDOCAINE 2 PATCH: 4 PATCH TOPICAL at 08:17

## 2025-04-28 RX ADMIN — INSULIN GLARGINE 30 UNITS: 100 INJECTION, SOLUTION SUBCUTANEOUS at 20:05

## 2025-04-28 RX ADMIN — ESCITALOPRAM OXALATE 10 MG: 10 TABLET ORAL at 08:16

## 2025-04-28 RX ADMIN — SUCRALFATE 1 G: 1 TABLET ORAL at 16:22

## 2025-04-28 RX ADMIN — ATORVASTATIN CALCIUM 20 MG: 10 TABLET, FILM COATED ORAL at 16:22

## 2025-04-28 RX ADMIN — METHYLPREDNISOLONE SODIUM SUCCINATE 40 MG: 40 INJECTION, POWDER, FOR SOLUTION INTRAMUSCULAR; INTRAVENOUS at 16:21

## 2025-04-28 RX ADMIN — NICOTINE 1 PATCH: 14 PATCH, EXTENDED RELEASE TRANSDERMAL at 08:16

## 2025-04-28 RX ADMIN — INSULIN LISPRO 16 UNITS: 100 INJECTION, SOLUTION INTRAVENOUS; SUBCUTANEOUS at 16:21

## 2025-04-28 RX ADMIN — IPRATROPIUM BROMIDE AND ALBUTEROL SULFATE 3 ML: .5; 3 SOLUTION RESPIRATORY (INHALATION) at 15:48

## 2025-04-28 ASSESSMENT — COGNITIVE AND FUNCTIONAL STATUS - GENERAL
TOILETING: A LITTLE
DRESSING REGULAR UPPER BODY CLOTHING: A LITTLE
CLIMB 3 TO 5 STEPS WITH RAILING: A LITTLE
MOBILITY SCORE: 20
DRESSING REGULAR UPPER BODY CLOTHING: A LITTLE
TOILETING: A LITTLE
WALKING IN HOSPITAL ROOM: A LITTLE
HELP NEEDED FOR BATHING: A LITTLE
DRESSING REGULAR LOWER BODY CLOTHING: A LITTLE
HELP NEEDED FOR BATHING: A LITTLE
DRESSING REGULAR LOWER BODY CLOTHING: A LITTLE
CLIMB 3 TO 5 STEPS WITH RAILING: A LITTLE
STANDING UP FROM CHAIR USING ARMS: A LITTLE
MOVING TO AND FROM BED TO CHAIR: A LITTLE
PERSONAL GROOMING: A LITTLE
EATING MEALS: A LITTLE
STANDING UP FROM CHAIR USING ARMS: A LITTLE
MOVING TO AND FROM BED TO CHAIR: A LITTLE
DAILY ACTIVITIY SCORE: 18
PERSONAL GROOMING: A LITTLE
MOBILITY SCORE: 20
WALKING IN HOSPITAL ROOM: A LITTLE
EATING MEALS: A LITTLE
DAILY ACTIVITIY SCORE: 18

## 2025-04-28 ASSESSMENT — PAIN SCALES - GENERAL
PAINLEVEL_OUTOF10: 0 - NO PAIN
PAINLEVEL_OUTOF10: 0 - NO PAIN

## 2025-04-28 ASSESSMENT — ACTIVITIES OF DAILY LIVING (ADL): LACK_OF_TRANSPORTATION: NO

## 2025-04-28 NOTE — CARE PLAN
The patient's goals for the shift include Get something to eat and something stronger than Tylenol for pain.    The clinical goals for the shift include Patient's SPO2 will remain >90% this shift    Assumed care of patient at 1930. Patient's SPO2 remained at 90-96% this shift. VSS. Patient up with one assist and cane to restroom x6 at beginning of shift. Purewick placed at 2200 per patient request to promote restful healing. PRN Compazine administered for nausea at 2016. Patient has had no episodes of emesis throughout shift. Encouraged to eat soft foods at  for snack. Tolerated applesauce. Patient is resting in bed with call light within reach.

## 2025-04-28 NOTE — PROGRESS NOTES
04/28/25 1016   Discharge Planning   Living Arrangements Children   Support Systems Children;Family members   Assistance Needed Patient lives at home with her son. States she is independent with ADL's and uses a cane for ambulation. Does not drive. Children do the grocery shopping and take her to appointments. Smokes 1/2 pack of cigarettes a day. Denies finanical difficulty.   Type of Residence Private residence   Number of Stairs to Enter Residence 1   Number of Stairs Within Residence 0   Do you have animals or pets at home? Yes   Type of Animals or Pets dog   Who is requesting discharge planning? Provider   Home or Post Acute Services None   Expected Discharge Disposition Home  (Discussed Freedom from Smoking program, rio given for July Session. Patient denies further assistance at discharge.)   Does the patient need discharge transport arranged? No  (Either son or daughter to .)   Financial Resource Strain   How hard is it for you to pay for the very basics like food, housing, medical care, and heating? Not hard   Housing Stability   In the last 12 months, was there a time when you were not able to pay the mortgage or rent on time? N   In the past 12 months, how many times have you moved where you were living? 0   At any time in the past 12 months, were you homeless or living in a shelter (including now)? N   Transportation Needs   In the past 12 months, has lack of transportation kept you from medical appointments or from getting medications? no   In the past 12 months, has lack of transportation kept you from meetings, work, or from getting things needed for daily living? No   Stroke Family Assessment   Stroke Family Assessment Needed No   Intensity of Service   Intensity of Service 0-30 min

## 2025-04-28 NOTE — NURSING NOTE
Met with patient at [facility] prior to discharge.  Patient was provided with COPD education, pulse oximeter, and incentive spirometer.  Patient verbalized understanding of educational materials and was provided time to ask questions.  This hospital to home nurse will conduct a follow-up phone visit with patient in 1-2 weeks after discharge, with the exception if the patient is discharged to a nursing facility.  BETINA Kirby, Hospital to Home RN.

## 2025-04-28 NOTE — PROGRESS NOTES
"Lenora Ames is a 69 y.o. female on day 2 of admission presenting with COPD with acute exacerbation (Multi).    Subjective     No overnight events reported.     Patient is up in the chair on 2L O2 this morning. She says she is still short of breath with ambulation. Appetite is good. Discussed overnight O2 trend and sleep medicine referral, patient in agreement. Plan of care discussed with patient, all questions answered.         Objective     Physical Exam  Constitutional:       General: She is not in acute distress.     Appearance: She is not toxic-appearing.   HENT:      Head: Normocephalic and atraumatic.      Mouth/Throat:      Mouth: Mucous membranes are moist.   Eyes:      Conjunctiva/sclera: Conjunctivae normal.   Cardiovascular:      Rate and Rhythm: Normal rate and regular rhythm.   Pulmonary:      Effort: No respiratory distress.      Breath sounds: Wheezing present.      Comments: Diminished breath sounds, on 2L O2 via NC  Abdominal:      General: There is no distension.      Palpations: Abdomen is soft.      Tenderness: There is no abdominal tenderness.   Musculoskeletal:         General: No swelling.   Skin:     General: Skin is warm and dry.   Neurological:      Mental Status: She is alert and oriented to person, place, and time.   Psychiatric:         Mood and Affect: Mood normal.         Behavior: Behavior normal.         Last Recorded Vitals  Blood pressure 113/67, pulse 90, temperature 36.4 °C (97.5 °F), temperature source Temporal, resp. rate 18, height 1.626 m (5' 4\"), weight 68.5 kg (151 lb 0.2 oz), SpO2 96%.  Intake/Output last 3 Shifts:  I/O last 3 completed shifts:  In: 1200 (17.5 mL/kg) [P.O.:1200]  Out: 950 (13.9 mL/kg) [Urine:950 (0.4 mL/kg/hr)]  Weight: 68.5 kg     Relevant Results          Scheduled medications  Scheduled Medications[1]  Continuous medications  Continuous Medications[2]  PRN medications  PRN Medications[3]    Results for orders placed or performed during the " hospital encounter of 04/26/25 (from the past 24 hours)   POCT GLUCOSE   Result Value Ref Range    POCT Glucose 268 (H) 74 - 99 mg/dL   POCT GLUCOSE   Result Value Ref Range    POCT Glucose 268 (H) 74 - 99 mg/dL   POCT GLUCOSE   Result Value Ref Range    POCT Glucose 405 (H) 74 - 99 mg/dL   CBC   Result Value Ref Range    WBC 16.4 (H) 4.4 - 11.3 x10*3/uL    nRBC 0.0 0.0 - 0.0 /100 WBCs    RBC 4.89 4.00 - 5.20 x10*6/uL    Hemoglobin 14.6 12.0 - 16.0 g/dL    Hematocrit 44.2 36.0 - 46.0 %    MCV 90 80 - 100 fL    MCH 29.9 26.0 - 34.0 pg    MCHC 33.0 32.0 - 36.0 g/dL    RDW 12.9 11.5 - 14.5 %    Platelets 227 150 - 450 x10*3/uL   Basic metabolic panel   Result Value Ref Range    Glucose 425 (H) 74 - 99 mg/dL    Sodium 133 (L) 136 - 145 mmol/L    Potassium 4.6 3.5 - 5.3 mmol/L    Chloride 97 (L) 98 - 107 mmol/L    Bicarbonate 26 21 - 32 mmol/L    Anion Gap 15 10 - 20 mmol/L    Urea Nitrogen 18 6 - 23 mg/dL    Creatinine 0.75 0.50 - 1.05 mg/dL    eGFR 87 >60 mL/min/1.73m*2    Calcium 9.6 8.6 - 10.3 mg/dL   POCT GLUCOSE   Result Value Ref Range    POCT Glucose 397 (H) 74 - 99 mg/dL     ECG 12 lead  Result Date: 4/28/2025  Normal sinus rhythm Left anterior fascicular block Abnormal ECG When compared with ECG of 13-OCT-2024 22:38, No significant change was found    XR chest 1 view  Result Date: 4/26/2025  Interpreted By:  Archie Oh, STUDY: XR CHEST 1 VIEW;  4/26/2025 6:24 pm   INDICATION: Signs/Symptoms:sob.   COMPARISON: 03/05/2023   ACCESSION NUMBER(S): [id number]   ORDERING CLINICIAN: SHAYNA BACON   FINDINGS: Cardiomediastinal silhouette and pulmonary vasculature are within normal limits. No consolidation, effusion or pneumothorax.       No acute cardiopulmonary process.   MACRO: None   Signed by: Archie Oh 4/26/2025 7:07 PM Dictation workstation:   [id number]                   Assessment & Plan  COPD with acute exacerbation (Multi)    DM type 2 with diabetic peripheral neuropathy    Acute respiratory  failure with hypoxia    E-cigarette or vaping product use associated lung injury (EVALI)    Anxiety and depression    RLS (restless legs syndrome)    Primary hypertension    Cigarette smoker    Cannabis abuse, uncomplicated    Gastroesophageal reflux disease without esophagitis    Dyslipidemia    Chronic diastolic heart failure    COPD exacerbation  Suspect EVALI  Polysubstance abuse (nicotine and marijuana)  Acute respiratory failure with hypoxia  - SpO2 87% on RA   - supplemental oxygen to keep SpO2 > 90%  - CXR: no acute process   - currently on 2lpm via NC; baseline RA   - Vapes marijuana daily  - smokes 1/2 ppd of cigarettes  - continue azithromycin 500 mg daily; day 2/5  - continue DuoNebs TID   - continue solumedrol 40 mg IV q8h   - nicotine 14 mg/24h patch daily  - RT for bronchial hygiene  - Overnight O2 trend ordered for tonight  - refer to sleep medicine as outpatient   - encourage tobacco/marijuana cessation on discharge      DM Type II with neuropathy   - SSI Lispro 0-20 scale  - hypoglycemia protocol  - continue lantus 30 units at bedtime    - consistent carb diet   - Monitor blood sugar ac/hs/prn while on steroids      Essential HTN  Dyslipidemia  Chronic Diastolic heart failure  -Echo (3/7/23): normal LVSF with an  60-65% with impaired relaxation pattern of LVDF  -Continue aspirin 81 mg daily atorvastatin 20 mg daily,  -Monitor volume status      Anxiety and Depression  -Continue escitalopram 10 mg daily     RLS  - continue ropinirole 0.5 mg at bedtime      Chronic pain  Generalized Weakness  -Continue lidocaine 4% patch daily  - PT/OT evaluation      PUD ppx  - continue pantoprazole 40 mg daily, sucralfate 1g BID     DVT ppx  - continue enoxaparin 40 mg daily     Code status: Full     Disposition: Pt requires inpatient management at this time.         Patt Logan PA-C         [1] aspirin, 81 mg, oral, Daily  atorvastatin, 20 mg, oral, Daily with evening meal  azithromycin, 500 mg, oral, q24h  [facility]  enoxaparin, 40 mg, subcutaneous, Daily  escitalopram, 10 mg, oral, Daily  insulin glargine, 30 Units, subcutaneous, Nightly  insulin lispro, 0-20 Units, subcutaneous, TID AC  ipratropium-albuteroL, 3 mL, nebulization, TID  lidocaine, 2 patch, transdermal, Daily  methylPREDNISolone sodium succinate (PF), 40 mg, intravenous, q8h  nicotine, 1 patch, transdermal, Daily  pantoprazole, 40 mg, oral, Daily before breakfast  pneumoc 20-hoda conj-dip cr(PF), 0.5 mL, intramuscular, During hospitalization  rOPINIRole, 0.5 mg, oral, Nightly  sucralfate, 1 g, oral, BID AC  [2]    [3] PRN medications: acetaminophen, albuterol, alum-mag hydroxide-simeth, benzocaine-menthol, cyclobenzaprine, dextrose, dextrose, glucagon, glucagon, guaiFENesin, hydrOXYzine HCL, meclizine, melatonin, ondansetron, oxygen, polyethylene glycol

## 2025-04-29 ENCOUNTER — PHARMACY VISIT (OUTPATIENT)
Dept: PHARMACY | Facility: CLINIC | Age: 70
End: 2025-04-29
Payer: MEDICARE

## 2025-04-29 VITALS
TEMPERATURE: 97.7 F | OXYGEN SATURATION: 94 % | HEIGHT: 64 IN | RESPIRATION RATE: 18 BRPM | BODY MASS INDEX: 25.78 KG/M2 | WEIGHT: 151.01 LBS | SYSTOLIC BLOOD PRESSURE: 137 MMHG | DIASTOLIC BLOOD PRESSURE: 82 MMHG | HEART RATE: 85 BPM

## 2025-04-29 LAB
ANION GAP SERPL CALC-SCNC: 13 MMOL/L (ref 10–20)
BUN SERPL-MCNC: 20 MG/DL (ref 6–23)
CALCIUM SERPL-MCNC: 9.7 MG/DL (ref 8.6–10.3)
CHLORIDE SERPL-SCNC: 97 MMOL/L (ref 98–107)
CO2 SERPL-SCNC: 29 MMOL/L (ref 21–32)
CREAT SERPL-MCNC: 0.71 MG/DL (ref 0.5–1.05)
EGFRCR SERPLBLD CKD-EPI 2021: >90 ML/MIN/1.73M*2
ERYTHROCYTE [DISTWIDTH] IN BLOOD BY AUTOMATED COUNT: 12.8 % (ref 11.5–14.5)
GLUCOSE BLD MANUAL STRIP-MCNC: 297 MG/DL (ref 74–99)
GLUCOSE SERPL-MCNC: 285 MG/DL (ref 74–99)
HCT VFR BLD AUTO: 45.3 % (ref 36–46)
HGB BLD-MCNC: 15.4 G/DL (ref 12–16)
MCH RBC QN AUTO: 30.3 PG (ref 26–34)
MCHC RBC AUTO-ENTMCNC: 34 G/DL (ref 32–36)
MCV RBC AUTO: 89 FL (ref 80–100)
NRBC BLD-RTO: 0 /100 WBCS (ref 0–0)
PLATELET # BLD AUTO: 222 X10*3/UL (ref 150–450)
POTASSIUM SERPL-SCNC: 4.2 MMOL/L (ref 3.5–5.3)
RBC # BLD AUTO: 5.08 X10*6/UL (ref 4–5.2)
SODIUM SERPL-SCNC: 135 MMOL/L (ref 136–145)
WBC # BLD AUTO: 14.2 X10*3/UL (ref 4.4–11.3)

## 2025-04-29 PROCEDURE — 82310 ASSAY OF CALCIUM: CPT | Mod: IPSPLIT

## 2025-04-29 PROCEDURE — 2500000004 HC RX 250 GENERAL PHARMACY W/ HCPCS (ALT 636 FOR OP/ED): Mod: JZ,IPSPLIT | Performed by: HOSPITALIST

## 2025-04-29 PROCEDURE — 94760 N-INVAS EAR/PLS OXIMETRY 1: CPT | Mod: IPSPLIT

## 2025-04-29 PROCEDURE — 94640 AIRWAY INHALATION TREATMENT: CPT | Mod: IPSPLIT

## 2025-04-29 PROCEDURE — 2500000005 HC RX 250 GENERAL PHARMACY W/O HCPCS: Mod: IPSPLIT | Performed by: HOSPITALIST

## 2025-04-29 PROCEDURE — 82947 ASSAY GLUCOSE BLOOD QUANT: CPT | Mod: IPSPLIT

## 2025-04-29 PROCEDURE — S4991 NICOTINE PATCH NONLEGEND: HCPCS | Mod: IPSPLIT | Performed by: HOSPITALIST

## 2025-04-29 PROCEDURE — RXMED WILLOW AMBULATORY MEDICATION CHARGE

## 2025-04-29 PROCEDURE — 2500000002 HC RX 250 W HCPCS SELF ADMINISTERED DRUGS (ALT 637 FOR MEDICARE OP, ALT 636 FOR OP/ED): Mod: IPSPLIT | Performed by: NURSE PRACTITIONER

## 2025-04-29 PROCEDURE — 2500000004 HC RX 250 GENERAL PHARMACY W/ HCPCS (ALT 636 FOR OP/ED): Mod: JZ,IPSPLIT

## 2025-04-29 PROCEDURE — 36415 COLL VENOUS BLD VENIPUNCTURE: CPT | Mod: IPSPLIT

## 2025-04-29 PROCEDURE — 94762 N-INVAS EAR/PLS OXIMTRY CONT: CPT | Mod: IPSPLIT

## 2025-04-29 PROCEDURE — 2500000001 HC RX 250 WO HCPCS SELF ADMINISTERED DRUGS (ALT 637 FOR MEDICARE OP): Mod: IPSPLIT | Performed by: HOSPITALIST

## 2025-04-29 PROCEDURE — 85027 COMPLETE CBC AUTOMATED: CPT | Mod: IPSPLIT

## 2025-04-29 PROCEDURE — 9420000001 HC RT PATIENT EDUCATION 5 MIN: Mod: IPSPLIT

## 2025-04-29 PROCEDURE — 2500000002 HC RX 250 W HCPCS SELF ADMINISTERED DRUGS (ALT 637 FOR MEDICARE OP, ALT 636 FOR OP/ED): Mod: IPSPLIT | Performed by: HOSPITALIST

## 2025-04-29 RX ORDER — METHYLPREDNISOLONE 4 MG/1
TABLET ORAL
Qty: 21 TABLET | Refills: 0 | Status: SHIPPED | OUTPATIENT
Start: 2025-04-29 | End: 2025-05-05

## 2025-04-29 RX ORDER — LIDOCAINE 560 MG/1
2 PATCH PERCUTANEOUS; TOPICAL; TRANSDERMAL DAILY PRN
Qty: 30 PATCH | Refills: 0 | Status: SHIPPED | OUTPATIENT
Start: 2025-04-29

## 2025-04-29 RX ORDER — AZITHROMYCIN 500 MG/1
500 TABLET, FILM COATED ORAL DAILY
Qty: 2 TABLET | Refills: 0 | Status: SHIPPED | OUTPATIENT
Start: 2025-04-29 | End: 2025-05-01

## 2025-04-29 RX ADMIN — METHYLPREDNISOLONE SODIUM SUCCINATE 40 MG: 40 INJECTION, POWDER, FOR SOLUTION INTRAMUSCULAR; INTRAVENOUS at 03:23

## 2025-04-29 RX ADMIN — IPRATROPIUM BROMIDE AND ALBUTEROL SULFATE 3 ML: .5; 3 SOLUTION RESPIRATORY (INHALATION) at 09:18

## 2025-04-29 RX ADMIN — LIDOCAINE 2 PATCH: 4 PATCH TOPICAL at 08:22

## 2025-04-29 RX ADMIN — ASPIRIN 81 MG CHEWABLE TABLET 81 MG: 81 TABLET CHEWABLE at 08:22

## 2025-04-29 RX ADMIN — ENOXAPARIN SODIUM 40 MG: 40 INJECTION SUBCUTANEOUS at 08:22

## 2025-04-29 RX ADMIN — AZITHROMYCIN DIHYDRATE 500 MG: 250 TABLET, FILM COATED ORAL at 08:22

## 2025-04-29 RX ADMIN — ACETAMINOPHEN 650 MG: 325 TABLET, FILM COATED ORAL at 03:23

## 2025-04-29 RX ADMIN — SUCRALFATE 1 G: 1 TABLET ORAL at 06:18

## 2025-04-29 RX ADMIN — NICOTINE 1 PATCH: 14 PATCH, EXTENDED RELEASE TRANSDERMAL at 08:22

## 2025-04-29 RX ADMIN — ESCITALOPRAM OXALATE 10 MG: 10 TABLET ORAL at 08:22

## 2025-04-29 RX ADMIN — PANTOPRAZOLE SODIUM 40 MG: 40 TABLET, DELAYED RELEASE ORAL at 06:18

## 2025-04-29 RX ADMIN — INSULIN LISPRO 12 UNITS: 100 INJECTION, SOLUTION INTRAVENOUS; SUBCUTANEOUS at 08:23

## 2025-04-29 ASSESSMENT — COGNITIVE AND FUNCTIONAL STATUS - GENERAL
DAILY ACTIVITIY SCORE: 18
MOVING TO AND FROM BED TO CHAIR: A LITTLE
WALKING IN HOSPITAL ROOM: A LITTLE
MOBILITY SCORE: 20
TOILETING: A LITTLE
DRESSING REGULAR LOWER BODY CLOTHING: A LITTLE
EATING MEALS: A LITTLE
CLIMB 3 TO 5 STEPS WITH RAILING: A LITTLE
DRESSING REGULAR UPPER BODY CLOTHING: A LITTLE
HELP NEEDED FOR BATHING: A LITTLE
PERSONAL GROOMING: A LITTLE
STANDING UP FROM CHAIR USING ARMS: A LITTLE

## 2025-04-29 ASSESSMENT — PAIN - FUNCTIONAL ASSESSMENT
PAIN_FUNCTIONAL_ASSESSMENT: 0-10

## 2025-04-29 ASSESSMENT — PAIN DESCRIPTION - LOCATION: LOCATION: HEAD

## 2025-04-29 ASSESSMENT — PAIN SCALES - GENERAL
PAINLEVEL_OUTOF10: 3
PAINLEVEL_OUTOF10: 0 - NO PAIN
PAINLEVEL_OUTOF10: 0 - NO PAIN

## 2025-04-29 ASSESSMENT — PAIN DESCRIPTION - DESCRIPTORS: DESCRIPTORS: ACHING

## 2025-04-29 NOTE — DISCHARGE INSTR - OTHER ORDERS
Thank you for choosing [facility] for your Health Care needs.  Also, thank you for allowing us to take you and your families preferences into account when determining your discharge plan.  Stay well!                                    Your Care Transitions Team Member:  Kaela Jarrett Robin and Cheryl     For questions about your medications listed on your discharge instructions, please call the Nurses Station at [phone].

## 2025-04-29 NOTE — DISCHARGE SUMMARY
Discharge Diagnosis  COPD with acute exacerbation (Multi)         Issues Requiring Follow-Up    Follow up with sleep medicine      Discharge Meds     Medication List      START taking these medications     azithromycin 500 mg tablet; Commonly known as: Zithromax; Take 1 tablet   (500 mg) by mouth once daily for 2 days. Start taking on 4/30/25   lidocaine 4 % patch; Place 2 patches over 12 hours on the skin once   daily as needed (back pain). Remove & discard patch within 12 hours or as   directed by MD.   methylPREDNISolone 4 mg tablets; Commonly known as: Medrol Dospak; Take   as directed on package. Take with food     CONTINUE taking these medications     albuterol 90 mcg/actuation inhaler; INHALE 2 PUFFS BY MOUTH EVERY 6   HOURS AS NEEDED FOR WHEEZING   aspirin 81 mg chewable tablet   atorvastatin 20 mg tablet; Commonly known as: Lipitor; TAKE ONE TABLET   BY MOUTH DAILY AT 5PM   * blood sugar diagnostic; 400 strips 4 times a day. Check sugar as   directed/4 times daily   * True Metrix Glucose Test Strip; Generic drug: blood sugar diagnostic;   Testing twice daily   * True Metrix Glucose Test Strip; Generic drug: blood sugar diagnostic;   USE AS INSTRUCTED TO CHECK BLOOD SUGARS 4 TIMES DAILY.   * OneTouch Ultra Test; Generic drug: blood sugar diagnostic; Testing   once daily   cyclobenzaprine 10 mg tablet; Commonly known as: Flexeril; TAKE ONE   TABLET BY MOUTH THREE TIMES A DAY   escitalopram 10 mg tablet; Commonly known as: Lexapro; TAKE ONE TABLET   BY MOUTH DAILY   HumaLOG KwikPen Insulin 100 unit/mL pen; Generic drug: insulin lispro;   USE AS DIRECTED PER SLIDING SCALE UP TO 24 UNITS TOTAL PER DAY (8 BEFORE   EACH MEAL).   hydrOXYzine HCL 25 mg tablet; Commonly known as: Atarax; TAKE ONE TABLET   (25 MG) BY MOUTH AT BEDTIME AS NEEDED FOR ITCHING   Lantus Solostar U-100 Insulin 100 unit/mL (3 mL) pen; Generic drug:   insulin glargine; INJECT 30 UNITS UNDER THE SKIN ONCE DAILY AT BEDTIME   meclizine 25 mg  "tablet; Commonly known as: Antivert; Take 1 tablet (25   mg) by mouth 3 times a day as needed for dizziness.   pantoprazole 40 mg EC tablet; Commonly known as: ProtoNix; TAKE ONE   TABLET (40 MG) BY MOUTH DAILY AT 9AM TAKE BEFORE MEALS. DO NOT CRUSH, CHEW   OR SPLIT   pen needle 1/2\" 29G X 12mm needle; Commonly known as: Comfort EZ Pen   Needles; Inject 3 each under the skin once daily. Use as instructed   rOPINIRole 0.5 mg tablet; Commonly known as: Requip; Take 1 tablet (0.5   mg) by mouth once daily at bedtime.   sucralfate 1 gram tablet; Commonly known as: Carafate; TAKE ONE TABLET   (1G) BY MOUTH TWICE DAILY  * This list has 4 medication(s) that are the same as other medications   prescribed for you. Read the directions carefully, and ask your doctor or   other care provider to review them with you.     STOP taking these medications     midodrine 5 mg tablet; Commonly known as: Proamatine       Test Results Pending At Discharge  Pending Labs       Order Current Status    Respiratory Culture/Smear Preliminary result          History Of Present Illness  Lenora Ames is a 69 y.o. female presenting with a complaint of SOB and cough. Her symptoms started about 1 week ago with chest congestion, cough, and SOB. She has right sided chest pain and dyspnea with exertion and at rest. She tried her nebulizer at home and did 4 treatment with no relief of symptoms. She denies fever, chills, N/V/D/C. She admits to smoking 1/2 pack of cigarettes per day.     In the ED:  VS: T36.7 HR 88; R 20; /62; SpO2 95% on RA  Labs: Glu 217; Na 136; K 4.2; BuN 10; Cr 0.66; Ca 10.4; WBC 10.0; Hb 15.5; Hcrt47.0;   Radiology: CXR No acute cardiopulmonary process.  Medications: Solumedrol  Disposition: admitted to med/surg    Hospital Course:   Patient was treated for COPD exacerbation with azithromycin and Solumedrol  with improvement. Overnight O2 trend was completed and patient does not qualify for home O2 on discharge. She " remains on room air while awake. She reports a history of ROSE MARIE and states she had a CPAP in the past but does not any longer. She has been referred to sleep medicine for outpatient sleep study. She is discharged on a steroid taper and azithromycin to complete 5 days. She has been educated on hyperglycemia 2/2 steroids and is on both Lantus and sliding scale insulin at home. She has a PCP follow up scheduled and is aware of sleep medicine referral. She feels comfortable discharging home with no homegoing needs.    Disposition: Patient stable for discharge home     Patient seen by Dr. Harris on day of discharge    Pertinent Physical Exam At Time of Discharge  Physical Exam  Constitutional:       General: She is not in acute distress.     Appearance: She is not toxic-appearing.   HENT:      Head: Normocephalic and atraumatic.      Mouth/Throat:      Mouth: Mucous membranes are moist.   Eyes:      Conjunctiva/sclera: Conjunctivae normal.   Cardiovascular:      Rate and Rhythm: Normal rate and regular rhythm.   Pulmonary:      Effort: No respiratory distress.      Breath sounds: Rhonchi present. No wheezing.      Comments: On room air   Abdominal:      General: There is no distension.      Palpations: Abdomen is soft.      Tenderness: There is no abdominal tenderness.   Musculoskeletal:         General: No swelling.   Skin:     General: Skin is warm and dry.   Neurological:      Mental Status: She is alert and oriented to person, place, and time.   Psychiatric:         Mood and Affect: Mood normal.         Behavior: Behavior normal.         Outpatient Follow-Up  Future Appointments   Date Time Provider Department Center   5/22/2025 10:30 AM Faraz Ortega MD [id number] Darrell Logan PA-C

## 2025-04-29 NOTE — NURSING NOTE
Assumed care of patient. Patient has no complaints at this time. Call light is within reach. Nurse will continue to medicate and monitor per MD order.

## 2025-04-29 NOTE — CARE PLAN
RT has completed the requested/ordered overnight trend on room air and patient does not qualify for home-going oxygen with her discharge.    Total Time Below: 2min 53sec  Longest Duration: 27sec  Lowest SpO2: 84% at 04/29/25 @ 12:09:41 AM  Number of Events: 17

## 2025-04-29 NOTE — CARE PLAN
The patient's goals for the shift include Get something to eat and something stronger than Tylenol for pain.    The clinical goals for the shift include Patient will maintain a safe enviornment during nursing shift    Pt high fall risk with bed alarm on. Uses call bell for assist this shift

## 2025-04-29 NOTE — PROGRESS NOTES
04/29/25 0912   Discharge Planning   Who is requesting discharge planning? Provider   Home or Post Acute Services None   Expected Discharge Disposition Home  (Patient denies any assistance needed upon discharge. Aware of discharge to home today.)   Does the patient need discharge transport arranged? No

## 2025-04-30 ENCOUNTER — PATIENT OUTREACH (OUTPATIENT)
Dept: PRIMARY CARE | Facility: CLINIC | Age: 70
End: 2025-04-30
Payer: MEDICARE

## 2025-04-30 LAB
BACTERIA SPEC RESP CULT: NORMAL
GRAM STN SPEC: NORMAL
GRAM STN SPEC: NORMAL

## 2025-04-30 NOTE — PROGRESS NOTES
Discharge Facility: [facility]  Discharge Diagnosis: Chronic Bronchitis  Admission Date: 26 Apr 25  Discharge Date: 29 Apr 25    PCP Appointment Date: Pt's Daughter declined  Specialist Appointment Date: None  Hospital Encounter and Summary Linked: Yes  ED to Hosp-Admission (Discharged) with Faraz Ortega MD (04/26/2025)     See discharge assessment below for further details     Wrap Up  Wrap Up Additional Comments: Pt's daughter stating pt is doing well since discharge. pt is up and about doing her morning routines. daughter able to obtain all medications without difficulty. daughter has no questions regarding medications at this time. daughter wishing to keep already scheduled appt with PCP set for 22 May 25 and not be seen sooner. (4/30/2025  9:59 AM)  Call End Time: 0959 (4/30/2025  9:59 AM)    Engagement  Call Start Time: 0949 (Spoke with Daughter Ruth) (4/30/2025  9:59 AM)    Medications  Medications reviewed with patient/caregiver?: Yes (4/30/2025  9:59 AM)  Is the patient having any side effects they believe may be caused by any medication additions or changes?: No (4/30/2025  9:59 AM)  Does the patient have all medications ordered at discharge?: Yes (4/30/2025  9:59 AM)  Prescription Comments: Daughter able to obtain all medications without difficulty (4/30/2025  9:59 AM)  Is the patient taking all medications as directed (includes completed medication regime)?: Yes (4/30/2025  9:59 AM)  Medication Comments: Daughter has no questions regarding medications (4/30/2025  9:59 AM)    Appointments  Does the patient have a primary care provider?: Yes (declined) (4/30/2025  9:59 AM)  Has the patient kept scheduled appointments due by today?: Yes (4/30/2025  9:59 AM)    Self Management  What is the home health agency?: None (4/30/2025  9:59 AM)  Has home health visited the patient within 72 hours of discharge?: Not applicable (4/30/2025  9:59 AM)  What Durable Medical Equipment (DME) was ordered?: None (4/30/2025   9:59 AM)    Patient Teaching  Does the patient have access to their discharge instructions?: Yes (4/30/2025  9:59 AM)  Care Management Interventions: Reviewed instructions with patient (4/30/2025  9:59 AM)  What is the patient's perception of their health status since discharge?: Improving (4/30/2025  9:59 AM)  Is the patient/caregiver able to teach back the hierarchy of who to call/visit for symptoms/problems? PCP, Specialist, Home Health nurse, Urgent Care, ED, 911: Yes (4/30/2025  9:59 AM)  Patient/Caregiver Education Comments: None (4/30/2025  9:59 AM)

## 2025-05-01 DIAGNOSIS — J45.20 INTERMITTENT ASTHMA, UNSPECIFIED ASTHMA SEVERITY, UNSPECIFIED WHETHER COMPLICATED (HHS-HCC): ICD-10-CM

## 2025-05-02 DIAGNOSIS — E11.9 TYPE 2 DIABETES MELLITUS WITHOUT COMPLICATION, UNSPECIFIED WHETHER LONG TERM INSULIN USE: ICD-10-CM

## 2025-05-02 RX ORDER — ALBUTEROL SULFATE 90 UG/1
2 INHALANT RESPIRATORY (INHALATION) EVERY 6 HOURS PRN
Qty: 18 G | Refills: 0 | Status: SHIPPED | OUTPATIENT
Start: 2025-05-02

## 2025-05-02 RX ORDER — CALCIUM CITRATE/VITAMIN D3 200MG-6.25
TABLET ORAL
Qty: 400 EACH | Refills: 0 | Status: SHIPPED | OUTPATIENT
Start: 2025-05-02

## 2025-05-06 ENCOUNTER — TELEPHONE (OUTPATIENT)
Dept: ADMINISTRATIVE | Facility: CLINIC | Age: 70
End: 2025-05-06
Payer: MEDICARE

## 2025-05-06 DIAGNOSIS — E78.00 HYPERCHOLESTEREMIA: ICD-10-CM

## 2025-05-06 DIAGNOSIS — K21.9 GASTROESOPHAGEAL REFLUX DISEASE WITHOUT ESOPHAGITIS: ICD-10-CM

## 2025-05-06 RX ORDER — ATORVASTATIN CALCIUM 20 MG/1
20 TABLET, FILM COATED ORAL
Qty: 90 TABLET | Refills: 0 | Status: SHIPPED | OUTPATIENT
Start: 2025-05-06

## 2025-05-06 RX ORDER — PANTOPRAZOLE SODIUM 40 MG/1
40 TABLET, DELAYED RELEASE ORAL
Qty: 90 TABLET | Refills: 0 | Status: SHIPPED | OUTPATIENT
Start: 2025-05-06

## 2025-05-06 NOTE — TELEPHONE ENCOUNTER
Daughter reported that patient doesn't answer her phone but that the patient is doing well. BETINA Kirby, [facility] RN.

## 2025-05-07 DIAGNOSIS — E11.42 DM TYPE 2 WITH DIABETIC PERIPHERAL NEUROPATHY: ICD-10-CM

## 2025-05-07 DIAGNOSIS — J44.9 CHRONIC OBSTRUCTIVE PULMONARY DISEASE, UNSPECIFIED COPD TYPE (MULTI): ICD-10-CM

## 2025-05-07 DIAGNOSIS — E11.49 OTHER DIABETIC NEUROLOGICAL COMPLICATION ASSOCIATED WITH TYPE 2 DIABETES MELLITUS: ICD-10-CM

## 2025-05-07 DIAGNOSIS — J45.909 ASTHMA, UNSPECIFIED ASTHMA SEVERITY, UNSPECIFIED WHETHER COMPLICATED, UNSPECIFIED WHETHER PERSISTENT (HHS-HCC): ICD-10-CM

## 2025-05-07 DIAGNOSIS — J45.20 INTERMITTENT ASTHMA, UNSPECIFIED ASTHMA SEVERITY, UNSPECIFIED WHETHER COMPLICATED (HHS-HCC): Primary | ICD-10-CM

## 2025-05-07 DIAGNOSIS — I11.0 HYPERTENSIVE HEART DISEASE WITH HEART FAILURE: ICD-10-CM

## 2025-05-07 DIAGNOSIS — J44.1 COPD WITH ACUTE EXACERBATION (MULTI): ICD-10-CM

## 2025-05-07 DIAGNOSIS — J44.1 COPD WITH ACUTE EXACERBATION (MULTI): Primary | ICD-10-CM

## 2025-05-07 DIAGNOSIS — I50.32 CHRONIC DIASTOLIC HEART FAILURE: ICD-10-CM

## 2025-05-07 DIAGNOSIS — I63.9 CEREBROVASCULAR ACCIDENT (CVA), UNSPECIFIED MECHANISM (MULTI): ICD-10-CM

## 2025-05-07 RX ORDER — IPRATROPIUM BROMIDE AND ALBUTEROL SULFATE 2.5; .5 MG/3ML; MG/3ML
SOLUTION RESPIRATORY (INHALATION)
Qty: 360 ML | Refills: 0 | Status: SHIPPED | OUTPATIENT
Start: 2025-05-07

## 2025-05-07 NOTE — PROGRESS NOTES
I reviewed the progress note and agree with the resident’s findings and plans as written. Case discussed with resident.    Kathi Hernandez, PharmD

## 2025-05-13 ENCOUNTER — DOCUMENTATION (OUTPATIENT)
Dept: PRIMARY CARE | Facility: CLINIC | Age: 70
End: 2025-05-13
Payer: MEDICARE

## 2025-05-21 NOTE — PATIENT INSTRUCTIONS
It was nice to see you in the office today!  As discussed during our visit...     Your A1C 9.2 was today. We will recheck this in 3 months.     Continue the same medications.  Your chronic conditions appear stable.  Call the office with any questions or concerns [phone]  Follow up in 4 months or sooner if needed.      Please have your 12 hour FASTING blood drawn in the next couple weeks.    You do NOT need an appointment for lab work and/or Xray's but now that [facility] was bought out by [employer] [facility] [employer], an appointment for lab work is RECOMMENDED / encouraged.    Do not get lab work done while you are on steroids (prednisone, medrol dose pack, dexamethasone) unless instructed differently by Dr Harris because this can cause some labs to be off.  Hold Biotin, B vitamins, B Complex for 2-3 days before any blood draw (this can cause false thyroid function tests)  During the 12 hour FASTING time, you may have BLACK coffee, BLACK tea and/or water at any time.    The two nearest Outpatient Lab's to THIS office is:  [facility] (this is IN the Urgent Care building by [employer])  [address]  [place] [address] [phone]    Link to Schedule an Appointment:  [email or web address]    Hours:   Monday through Friday 7:30am - 4:30pm  CLOSED Saturday and Sunday    Or you can go to ...  [facility]  [address]  [place], [place] [address] [phone]    Link to Schedule an Appointment:  [email or web address]    Hours:   Monday through Friday 7am - 12:30pm, 1pm - 4:30pm  CLOSED Saturday and Sunday     Click the blue link to take you to the website to confirm hours/location Location Search for Labwork  [email or web address]    We will contact you with the results of your  blood work (once they have ALL finalized & Dr Harris has reviewed them) and let you know of any necessary adjustments need to be done to your plan of care.    If you do not hear from us within 3-5 days business days of having your blood drawn, please call the [place] office at [phone].   -----------  The  [employer] [facility] offers affordable laboratory tests.   Financial assistance is also available to those who meet financial eligibility requirements by submitting an [facility] Application or calling, [phone].   Click this link to Get a Price Estimate Today on what lab work may cost you.

## 2025-05-21 NOTE — PROGRESS NOTES
Patient ID:   Lenora Ames is a 69 y.o. female with PMH remarkable for DM type 2, CVA, HTN, HLD, depression, OAB who presents to the office today for Follow-up.    HEALTH MAINTENANCE: Follow Up  Last Office Visit:10/29/24  Last Labs:CBC and BMP done 4/28/25 6/8/24 Hgb A1C: 10/29/24- 6.5 DUE TODAY  Mammogram (40-75): 10/31/24 -negative due 10/31/25   Colonoscopy (45-75 or age 40 with 1st degree relative dx colon ca):Has active Cologuard order   Lung cancer screening (50-76 y/o x 20 pk yr for at least 20 yrs + current smoker OR quit in last 15 years, no CT w/I last year): 10/14/2024   DEXA (65+, q 2 years women and 70+ men): 12/9/24 shows Left hip osteopenia. Normal lumbar spine bone mineral density, probably elevated by degenerative changes, rec calcium with vit d daily     She states that she is doing okay since being home. States that she was in the hospital last month for her COPD. She states that she has been getting headaches and feeling dizzy at times, states that it doesn't last long when it does happen. She states that this has been happening for awhile. Denies any changes to vision or falls when she has these episodes. She also states that she has been having difficulty swallowing food, states that it feels like it is getting stuck and then she starts coughing. She state that this started over a year ago. She states that she has never been seen for this in the past. States that she noticed her appetite is decreased due to this. Denies any difficulty swallowing liquids or soft foods. Denies any issues with her bowel or bladder, no shortness of breath or chest pain.       Social History[1]  Review of Systems   Constitutional: Negative.    HENT: Negative.          Headache   Difficulty swallowing   Eyes: Negative.    Respiratory: Negative.     Cardiovascular: Negative.    Gastrointestinal: Negative.    Endocrine: Negative.    Genitourinary: Negative.    Musculoskeletal:  Positive for gait problem.   Skin:  "Negative.    Psychiatric/Behavioral: Negative.     All other systems reviewed and are negative.    Visit Vitals  /71 (BP Location: Left arm, Patient Position: Sitting)   Pulse 80   Resp 18   Ht 1.626 m (5' 4\")   Wt 69.9 kg (154 lb)   SpO2 95%   BMI 26.43 kg/m²   OB Status Postmenopausal   Smoking Status Every Day   BSA 1.78 m²     Physical Exam  Vitals reviewed. Exam conducted with a chaperone present.   Constitutional:       Appearance: Normal appearance. She is well-developed.   HENT:      Head: Normocephalic.      Right Ear: External ear normal.      Left Ear: External ear normal.      Nose: Nose normal.      Mouth/Throat:      Lips: Pink.      Mouth: Mucous membranes are moist.   Eyes:      General: Lids are normal.      Pupils: Pupils are equal, round, and reactive to light.   Neck:      Trachea: Trachea normal.   Cardiovascular:      Rate and Rhythm: Normal rate and regular rhythm.      Heart sounds: Normal heart sounds.   Pulmonary:      Effort: Pulmonary effort is normal.      Breath sounds: Rhonchi present.   Abdominal:      General: Bowel sounds are normal.      Palpations: Abdomen is soft.   Musculoskeletal:         General: Normal range of motion.      Cervical back: Normal range of motion.   Skin:     General: Skin is warm and moist.   Neurological:      General: No focal deficit present.      Mental Status: She is alert and oriented to person, place, and time. Mental status is at baseline.      Gait: Gait abnormal.      Comments: Uses cane with ambulation    Psychiatric:         Attention and Perception: Attention normal.         Mood and Affect: Mood normal.         Speech: Speech normal.         Behavior: Behavior is cooperative.         Thought Content: Thought content normal.         Cognition and Memory: Cognition normal.         Judgment: Judgment normal.       Current Outpatient Medications   Medication Instructions    albuterol 90 mcg/actuation inhaler 2 puffs, Every 6 hours PRN    " "aspirin 81 mg, Daily    atorvastatin (LIPITOR) 20 mg, oral, Daily with evening meal, Take daily at 5:00 PM    blood sugar diagnostic (True Metrix Glucose Test Strip) strip Testing twice daily    blood sugar diagnostic (True Metrix Glucose Test Strip) USE AS INSTRUCTED TO CHECK BLOOD SUGARS 4 TIMES DAILY.    cyclobenzaprine (FLEXERIL) 10 mg, oral, 3 times daily    escitalopram (LEXAPRO) 10 mg, oral, Daily    HumaLOG KwikPen Insulin 100 unit/mL injection USE AS DIRECTED PER SLIDING SCALE UP TO 24 UNITS TOTAL PER DAY (8 BEFORE EACH MEAL).    hydrOXYzine HCL (Atarax) 25 mg tablet TAKE ONE TABLET (25 MG) BY MOUTH AT BEDTIME AS NEEDED FOR ITCHING    ipratropium-albuteroL (Duo-Neb) 0.5-2.5 mg/3 mL nebulizer solution INHALE THE CONTENTS OF 1 VIAL (3 ML) VIA NEBULIZER AS INSTRUCTED EVERY 4 HOURS AS NEEDED **INHALE OVER 5 TO 15 MINUTES**    Lantus Solostar U-100 Insulin 30 Units, subcutaneous, Nightly    lidocaine 4 % patch 2 patches, transdermal, Daily PRN, Remove & discard patch within 12 hours or as directed by MD.    meclizine (ANTIVERT) 25 mg, oral, 3 times daily PRN    OneTouch Delica Plus Lancet 33 gauge misc 4 times daily    OneTouch Ultra2 Meter misc 4 times daily    pantoprazole (PROTONIX) 40 mg, oral, Daily before breakfast, Do not crush, chew, or split.    pen needle 1/2\" (Comfort EZ Pen Needles) 29G X 12mm needle 3 each, subcutaneous, Daily, Use as instructed    rOPINIRole (REQUIP) 0.5 mg, oral, Nightly    sucralfate (Carafate) 1 gram tablet TAKE ONE TABLET (1G) BY MOUTH TWICE DAILY      Lab Results   Component Value Date    WBC 14.2 (H) 04/29/2025    HGB 15.4 04/29/2025    HCT 45.3 04/29/2025     04/29/2025    CHOL 118 03/06/2023    TRIG 301 (H) 03/06/2023    HDL 34.0 (A) 03/06/2023    ALT 13 04/26/2025    AST 13 04/26/2025     (L) 04/29/2025    K 4.2 04/29/2025    CL 97 (L) 04/29/2025    CREATININE 0.71 04/29/2025    BUN 20 04/29/2025    CO2 29 04/29/2025    INR 1.0 08/07/2024    HGBA1C 9.2 (A) " 05/22/2025       Problem List Items Addressed This Visit           ICD-10-CM    Anxiety and depression F41.9, F32.A    -she is not taking the Lexapro   -denies any symptoms or depression or anxiety at this time   -advised to call the office if she starts to notice any change in mood or behvaiors         DM type 2 with diabetic peripheral neuropathy E11.42    -A1C was 9.2 today, will recheck in 3 months  -continue to monitor blood sugar at home  -continue 30u of Lantus at HS  -Continue SS Humalog          Relevant Medications    OneTouch Delica Plus Lancet 33 gauge misc    OneTouch Ultra2 Meter misc    Other Relevant Orders    Referral to Podiatry    Diabetic neuropathy (Multi) E11.40    -stable  -referral placed for Podiatry          Hypercholesteremia E78.00    -she stopped taking the Lipitor  -will recheck labs at next visit  - Patient educated and counseled to do walking and exercise regularly  - Low-fat low-cholesterol diet is advised  - Advised to reduce weight   - The goal is to keep the LDL less than 70, and HDL the more than 40           Relevant Orders    Lipid Panel    Hypotension I95.9    -BP is 132/71  -continue to monitor at home            Hypokalemia E87.6    -reviewed last labs  -will recheck at next visit         Relevant Orders    CBC    Comprehensive Metabolic Panel    Hyponatremia E87.1    -reviewed last labs  -recheck at next visit          Relevant Orders    CBC    Comprehensive Metabolic Panel    Gastroesophageal reflux disease without esophagitis K21.9    -patient is not taking the Carafate or Omeprazole at this time   -explained the importance of taking these medications to the patient and her daughter and she declined them at this time         Cigarette smoker F17.210    I have counseled pt about the need for tobacco cessation and how I can support his efforts when pt is ready to quit.   Discussed about various nicotine replacement therapies as potential options.           Diabetes mellitus  type 2, insulin dependent (Multi) E11.9, Z79.4    -A1C was 9.2 today, will recheck in 3 months  -continue to monitor blood sugar at home  -continue 30u of Lantus at HS  -Continue SS Humalog          Relevant Medications    OneTouch Delica Plus Lancet 33 gauge misc    OneTouch Ultra2 Meter misc    Other Relevant Orders    POCT glycosylated hemoglobin (Hb A1C) manually resulted (Completed)    CBC    Comprehensive Metabolic Panel    Albumin-Creatinine Ratio, Urine Random    Hypertensive heart disease with heart failure I11.0    -stable         Dysphagia R13.10    -complaints of difficulty swallowing food,coughs after eating  -a referral was placed for speech therapy          Relevant Orders    Referral to Speech Therapy     Other Visit Diagnoses         Codes      Routine general medical examination at a health care facility     Z00.00    Relevant Orders    CBC    Comprehensive Metabolic Panel    Lipid Panel    TSH with reflex to Free T4 if abnormal    Vitamin B12    Vitamin D 1,25 Dihydroxy (for eval of hypercalcemia)            --------------------  Written by TALIA RAUSCH LPN, acting as a scribe for Dr. Harris. This note accurately reflects the work and decisions made by Dr. Harris.     I, Dr. Harris, attest all medical record entries made by the scribe were under my direction and were personally dictated by me. I have reviewed the chart and agree that the record accurately reflects my performance of the history, physical exam, and assessment and plan.          [1]   Social History  Tobacco Use    Smoking status: Every Day     Current packs/day: 0.50     Types: Cigarettes    Smokeless tobacco: Never   Vaping Use    Vaping status: Never Used   Substance Use Topics    Alcohol use: Yes     Alcohol/week: 1.0 standard drink of alcohol     Types: 1 Cans of beer per week     Comment: occasional    Drug use: Yes     Types: Marijuana     Comment: daily use

## 2025-05-22 ENCOUNTER — APPOINTMENT (OUTPATIENT)
Dept: PRIMARY CARE | Facility: CLINIC | Age: 70
End: 2025-05-22
Payer: MEDICARE

## 2025-05-22 VITALS
DIASTOLIC BLOOD PRESSURE: 71 MMHG | BODY MASS INDEX: 26.29 KG/M2 | OXYGEN SATURATION: 95 % | RESPIRATION RATE: 18 BRPM | HEIGHT: 64 IN | SYSTOLIC BLOOD PRESSURE: 132 MMHG | WEIGHT: 154 LBS | HEART RATE: 80 BPM

## 2025-05-22 DIAGNOSIS — Z79.4 DIABETES MELLITUS TYPE 2, INSULIN DEPENDENT (MULTI): ICD-10-CM

## 2025-05-22 DIAGNOSIS — E11.9 DIABETES MELLITUS TYPE 2, INSULIN DEPENDENT (MULTI): ICD-10-CM

## 2025-05-22 DIAGNOSIS — E87.6 HYPOKALEMIA: ICD-10-CM

## 2025-05-22 DIAGNOSIS — E87.1 HYPONATREMIA: ICD-10-CM

## 2025-05-22 DIAGNOSIS — E11.49 OTHER DIABETIC NEUROLOGICAL COMPLICATION ASSOCIATED WITH TYPE 2 DIABETES MELLITUS: ICD-10-CM

## 2025-05-22 DIAGNOSIS — F41.9 ANXIETY AND DEPRESSION: ICD-10-CM

## 2025-05-22 DIAGNOSIS — F32.A ANXIETY AND DEPRESSION: ICD-10-CM

## 2025-05-22 DIAGNOSIS — I95.9 HYPOTENSION, UNSPECIFIED HYPOTENSION TYPE: ICD-10-CM

## 2025-05-22 DIAGNOSIS — E78.00 HYPERCHOLESTEREMIA: ICD-10-CM

## 2025-05-22 DIAGNOSIS — K21.9 GASTROESOPHAGEAL REFLUX DISEASE WITHOUT ESOPHAGITIS: ICD-10-CM

## 2025-05-22 DIAGNOSIS — F17.210 CIGARETTE SMOKER: ICD-10-CM

## 2025-05-22 DIAGNOSIS — E11.42 DM TYPE 2 WITH DIABETIC PERIPHERAL NEUROPATHY: ICD-10-CM

## 2025-05-22 DIAGNOSIS — I11.0 HYPERTENSIVE HEART DISEASE WITH HEART FAILURE: ICD-10-CM

## 2025-05-22 DIAGNOSIS — R13.10 DYSPHAGIA, UNSPECIFIED TYPE: ICD-10-CM

## 2025-05-22 DIAGNOSIS — Z00.00 ROUTINE GENERAL MEDICAL EXAMINATION AT A HEALTH CARE FACILITY: ICD-10-CM

## 2025-05-22 LAB — POC HEMOGLOBIN A1C: 9.2 % (ref 4.2–6.5)

## 2025-05-22 PROCEDURE — 1160F RVW MEDS BY RX/DR IN RCRD: CPT | Performed by: INTERNAL MEDICINE

## 2025-05-22 PROCEDURE — 99214 OFFICE O/P EST MOD 30 MIN: CPT | Performed by: INTERNAL MEDICINE

## 2025-05-22 PROCEDURE — 3008F BODY MASS INDEX DOCD: CPT | Performed by: INTERNAL MEDICINE

## 2025-05-22 PROCEDURE — 3046F HEMOGLOBIN A1C LEVEL >9.0%: CPT | Performed by: INTERNAL MEDICINE

## 2025-05-22 PROCEDURE — 3075F SYST BP GE 130 - 139MM HG: CPT | Performed by: INTERNAL MEDICINE

## 2025-05-22 PROCEDURE — 1159F MED LIST DOCD IN RCRD: CPT | Performed by: INTERNAL MEDICINE

## 2025-05-22 PROCEDURE — 1125F AMNT PAIN NOTED PAIN PRSNT: CPT | Performed by: INTERNAL MEDICINE

## 2025-05-22 PROCEDURE — 3078F DIAST BP <80 MM HG: CPT | Performed by: INTERNAL MEDICINE

## 2025-05-22 PROCEDURE — 1111F DSCHRG MED/CURRENT MED MERGE: CPT | Performed by: INTERNAL MEDICINE

## 2025-05-22 PROCEDURE — 83036 HEMOGLOBIN GLYCOSYLATED A1C: CPT | Performed by: INTERNAL MEDICINE

## 2025-05-22 PROCEDURE — 4004F PT TOBACCO SCREEN RCVD TLK: CPT | Performed by: INTERNAL MEDICINE

## 2025-05-22 RX ORDER — LANCETS 30 GAUGE
EACH MISCELLANEOUS 4 TIMES DAILY
COMMUNITY
Start: 2025-05-03

## 2025-05-22 RX ORDER — LANCETS 33 GAUGE
EACH MISCELLANEOUS 4 TIMES DAILY
COMMUNITY
Start: 2025-05-03

## 2025-05-22 ASSESSMENT — PAIN SCALES - GENERAL: PAINLEVEL_OUTOF10: 5

## 2025-05-22 ASSESSMENT — ENCOUNTER SYMPTOMS
EYES NEGATIVE: 1
GASTROINTESTINAL NEGATIVE: 1
CONSTITUTIONAL NEGATIVE: 1
ENDOCRINE NEGATIVE: 1
RESPIRATORY NEGATIVE: 1
CARDIOVASCULAR NEGATIVE: 1
PSYCHIATRIC NEGATIVE: 1

## 2025-05-22 ASSESSMENT — PATIENT HEALTH QUESTIONNAIRE - PHQ9
10. IF YOU CHECKED OFF ANY PROBLEMS, HOW DIFFICULT HAVE THESE PROBLEMS MADE IT FOR YOU TO DO YOUR WORK, TAKE CARE OF THINGS AT HOME, OR GET ALONG WITH OTHER PEOPLE: SOMEWHAT DIFFICULT
2. FEELING DOWN, DEPRESSED OR HOPELESS: SEVERAL DAYS
1. LITTLE INTEREST OR PLEASURE IN DOING THINGS: NOT AT ALL
SUM OF ALL RESPONSES TO PHQ9 QUESTIONS 1 AND 2: 1

## 2025-05-22 NOTE — ASSESSMENT & PLAN NOTE
-she is not taking the Lexapro   -denies any symptoms or depression or anxiety at this time   -advised to call the office if she starts to notice any change in mood or behvaiors

## 2025-05-22 NOTE — ASSESSMENT & PLAN NOTE
I have counseled pt about the need for tobacco cessation and how I can support his efforts when pt is ready to quit.   Discussed about various nicotine replacement therapies as potential options.

## 2025-05-22 NOTE — ASSESSMENT & PLAN NOTE
-patient is not taking the Carafate or Omeprazole at this time   -explained the importance of taking these medications to the patient and her daughter and she declined them at this time

## 2025-05-22 NOTE — ASSESSMENT & PLAN NOTE
-A1C was 9.2 today, will recheck in 3 months  -continue to monitor blood sugar at home  -continue 30u of Lantus at HS  -Continue SS Humalog

## 2025-05-22 NOTE — ASSESSMENT & PLAN NOTE
-she stopped taking the Lipitor  -will recheck labs at next visit  - Patient educated and counseled to do walking and exercise regularly  - Low-fat low-cholesterol diet is advised  - Advised to reduce weight   - The goal is to keep the LDL less than 70, and HDL the more than 40

## 2025-05-22 NOTE — ASSESSMENT & PLAN NOTE
-complaints of difficulty swallowing food,coughs after eating  -a referral was placed for speech therapy

## 2025-05-23 DIAGNOSIS — J45.20 INTERMITTENT ASTHMA, UNSPECIFIED ASTHMA SEVERITY, UNSPECIFIED WHETHER COMPLICATED (HHS-HCC): ICD-10-CM

## 2025-05-23 DIAGNOSIS — E11.649 UNCONTROLLED TYPE 2 DIABETES MELLITUS WITH HYPOGLYCEMIA WITHOUT COMA: ICD-10-CM

## 2025-05-23 RX ORDER — ALBUTEROL SULFATE 90 UG/1
2 INHALANT RESPIRATORY (INHALATION) EVERY 6 HOURS PRN
Qty: 18 G | Refills: 0 | Status: SHIPPED | OUTPATIENT
Start: 2025-05-23

## 2025-05-23 RX ORDER — INSULIN GLARGINE 100 [IU]/ML
30 INJECTION, SOLUTION SUBCUTANEOUS NIGHTLY
Qty: 15 ML | Refills: 0 | Status: SHIPPED | OUTPATIENT
Start: 2025-05-23

## 2025-05-24 DIAGNOSIS — J44.1 COPD WITH ACUTE EXACERBATION (MULTI): ICD-10-CM

## 2025-05-26 DIAGNOSIS — K21.9 GASTROESOPHAGEAL REFLUX DISEASE WITHOUT ESOPHAGITIS: ICD-10-CM

## 2025-05-27 ENCOUNTER — DOCUMENTATION (OUTPATIENT)
Dept: PRIMARY CARE | Facility: CLINIC | Age: 70
End: 2025-05-27
Payer: MEDICARE

## 2025-05-27 RX ORDER — SUCRALFATE 1 G/1
TABLET ORAL
Qty: 180 TABLET | Refills: 0 | Status: SHIPPED | OUTPATIENT
Start: 2025-05-27

## 2025-05-27 RX ORDER — IPRATROPIUM BROMIDE AND ALBUTEROL SULFATE 2.5; .5 MG/3ML; MG/3ML
SOLUTION RESPIRATORY (INHALATION)
Qty: 360 ML | Refills: 0 | Status: SHIPPED | OUTPATIENT
Start: 2025-05-27

## 2025-06-13 DIAGNOSIS — J44.1 COPD WITH ACUTE EXACERBATION (MULTI): ICD-10-CM

## 2025-06-13 RX ORDER — IPRATROPIUM BROMIDE AND ALBUTEROL SULFATE 2.5; .5 MG/3ML; MG/3ML
SOLUTION RESPIRATORY (INHALATION)
Qty: 360 ML | Refills: 0 | Status: SHIPPED | OUTPATIENT
Start: 2025-06-13

## 2025-06-19 DIAGNOSIS — J45.20 INTERMITTENT ASTHMA, UNSPECIFIED ASTHMA SEVERITY, UNSPECIFIED WHETHER COMPLICATED (HHS-HCC): ICD-10-CM

## 2025-06-19 RX ORDER — ALBUTEROL SULFATE 90 UG/1
2 INHALANT RESPIRATORY (INHALATION) EVERY 6 HOURS PRN
Qty: 18 G | Refills: 0 | Status: SHIPPED | OUTPATIENT
Start: 2025-06-19

## 2025-07-08 DIAGNOSIS — E11.649 UNCONTROLLED TYPE 2 DIABETES MELLITUS WITH HYPOGLYCEMIA WITHOUT COMA: ICD-10-CM

## 2025-07-09 RX ORDER — INSULIN GLARGINE 100 [IU]/ML
30 INJECTION, SOLUTION SUBCUTANEOUS NIGHTLY
Qty: 15 ML | Refills: 0 | Status: SHIPPED | OUTPATIENT
Start: 2025-07-09

## 2025-07-17 ENCOUNTER — OFFICE VISIT (OUTPATIENT)
Dept: OPHTHALMOLOGY | Facility: CLINIC | Age: 70
End: 2025-07-17
Payer: COMMERCIAL

## 2025-07-17 DIAGNOSIS — H52.223 REGULAR ASTIGMATISM OF BOTH EYES WITH PRESBYOPIA: Primary | ICD-10-CM

## 2025-07-17 DIAGNOSIS — E11.9 TYPE 2 DIABETES MELLITUS WITHOUT RETINOPATHY (MULTI): ICD-10-CM

## 2025-07-17 DIAGNOSIS — H25.13 AGE-RELATED NUCLEAR CATARACT OF BOTH EYES: ICD-10-CM

## 2025-07-17 DIAGNOSIS — H52.4 REGULAR ASTIGMATISM OF BOTH EYES WITH PRESBYOPIA: Primary | ICD-10-CM

## 2025-07-17 PROCEDURE — 92134 CPTRZ OPH DX IMG PST SGM RTA: CPT | Performed by: STUDENT IN AN ORGANIZED HEALTH CARE EDUCATION/TRAINING PROGRAM

## 2025-07-17 PROCEDURE — 2023F DILAT RTA XM W/O RTNOPTHY: CPT | Performed by: STUDENT IN AN ORGANIZED HEALTH CARE EDUCATION/TRAINING PROGRAM

## 2025-07-17 PROCEDURE — 92004 COMPRE OPH EXAM NEW PT 1/>: CPT | Performed by: STUDENT IN AN ORGANIZED HEALTH CARE EDUCATION/TRAINING PROGRAM

## 2025-07-17 ASSESSMENT — ENCOUNTER SYMPTOMS
CONSTITUTIONAL NEGATIVE: 0
HEMATOLOGIC/LYMPHATIC NEGATIVE: 0
ALLERGIC/IMMUNOLOGIC NEGATIVE: 0
ENDOCRINE NEGATIVE: 0
NEUROLOGICAL NEGATIVE: 0
PSYCHIATRIC NEGATIVE: 0
GASTROINTESTINAL NEGATIVE: 0
MUSCULOSKELETAL NEGATIVE: 0
EYES NEGATIVE: 1
CARDIOVASCULAR NEGATIVE: 0
RESPIRATORY NEGATIVE: 0

## 2025-07-17 ASSESSMENT — REFRACTION_MANIFEST
OD_AXIS: 061
OD_SPHERE: +1.00
METHOD_AUTOREFRACTION: 1
OS_ADD: +2.50
OS_SPHERE: NO CENTER
OS_CYLINDER: -1.50
OD_ADD: +2.50
OS_AXIS: 103
OS_SPHERE: -0.25
OD_AXIS: 075
OD_CYLINDER: -1.75
OD_CYLINDER: -1.25
OD_SPHERE: -0.25

## 2025-07-17 ASSESSMENT — VISUAL ACUITY
METHOD: SNELLEN - LINEAR
OS_CC: 20/60
OD_CC: 20/80
CORRECTION_TYPE: GLASSES

## 2025-07-17 ASSESSMENT — CUP TO DISC RATIO
OS_RATIO: .30
OD_RATIO: .30

## 2025-07-17 ASSESSMENT — CONF VISUAL FIELD
OD_INFERIOR_TEMPORAL_RESTRICTION: 0
OS_SUPERIOR_NASAL_RESTRICTION: 0
OD_NORMAL: 1
OD_SUPERIOR_NASAL_RESTRICTION: 0
OS_SUPERIOR_TEMPORAL_RESTRICTION: 0
OD_INFERIOR_NASAL_RESTRICTION: 0
OS_INFERIOR_TEMPORAL_RESTRICTION: 0
OD_SUPERIOR_TEMPORAL_RESTRICTION: 0
OS_NORMAL: 1
OS_INFERIOR_NASAL_RESTRICTION: 0

## 2025-07-17 ASSESSMENT — REFRACTION_WEARINGRX
OD_CYLINDER: -1.75
OD_AXIS: 061
OS_SPHERE: -0.75
OD_SPHERE: -0.25
OS_CYLINDER: -1.75
OS_AXIS: 103

## 2025-07-17 ASSESSMENT — EXTERNAL EXAM - RIGHT EYE: OD_EXAM: NORMAL

## 2025-07-17 ASSESSMENT — EXTERNAL EXAM - LEFT EYE: OS_EXAM: NORMAL

## 2025-07-17 ASSESSMENT — SLIT LAMP EXAM - LIDS
COMMENTS: DERMATOCHALASIS UL
COMMENTS: DERMATOCHALASIS UL

## 2025-07-17 ASSESSMENT — TONOMETRY
IOP_METHOD: GOLDMANN APPLANATION
OS_IOP_MMHG: 17
OD_IOP_MMHG: 17

## 2025-07-17 NOTE — PROGRESS NOTES
Assessment/Plan   Diagnoses and all orders for this visit:  Regular astigmatism of both eyes with presbyopia  Age-related nuclear cataract of both eyes  -BCVA with MRX today OD 20/80 OS 20/50  -on exam patient has visually significant cataracts   -ed on findings and referral for cataract sx evaluation made  Type 2 diabetes mellitus without retinopathy (Multi)  -(+) med hx of DM type 2, CVA, HTN, HLD, [facility]  -no retinopathy observed on exam today od/os, pt ed to continue good BGlc, blood pressure and lipid control, rtc with any changes in vision, otherwise monitor 1 year  -history of a stroke    RTC for cataract sx consult-pt prefers [facility] location     Detail Level: Zone Detail Level: Detailed

## 2025-07-21 DIAGNOSIS — Z79.4 DIABETES MELLITUS TYPE 2, INSULIN DEPENDENT (MULTI): ICD-10-CM

## 2025-07-21 DIAGNOSIS — E11.9 DIABETES MELLITUS TYPE 2, INSULIN DEPENDENT (MULTI): ICD-10-CM

## 2025-07-21 RX ORDER — LANCETS 30 GAUGE
EACH MISCELLANEOUS 4 TIMES DAILY
Qty: 1 EACH | Refills: 0 | Status: SHIPPED | OUTPATIENT
Start: 2025-07-21

## 2025-07-22 DIAGNOSIS — K21.9 GASTROESOPHAGEAL REFLUX DISEASE WITHOUT ESOPHAGITIS: ICD-10-CM

## 2025-07-22 DIAGNOSIS — E78.00 HYPERCHOLESTEREMIA: ICD-10-CM

## 2025-07-22 RX ORDER — PANTOPRAZOLE SODIUM 40 MG/1
TABLET, DELAYED RELEASE ORAL
Qty: 90 TABLET | Refills: 0 | Status: SHIPPED | OUTPATIENT
Start: 2025-07-22

## 2025-07-22 RX ORDER — ATORVASTATIN CALCIUM 20 MG/1
TABLET, FILM COATED ORAL
Qty: 90 TABLET | Refills: 0 | Status: SHIPPED | OUTPATIENT
Start: 2025-07-22

## 2025-08-07 ENCOUNTER — EVALUATION (OUTPATIENT)
Dept: SPEECH THERAPY | Facility: HOSPITAL | Age: 70
End: 2025-08-07
Payer: MEDICARE

## 2025-08-07 DIAGNOSIS — R47.01 APHASIA: ICD-10-CM

## 2025-08-07 DIAGNOSIS — R41.89 COGNITIVE IMPAIRMENT: Primary | ICD-10-CM

## 2025-08-07 PROCEDURE — 92523 SPEECH SOUND LANG COMPREHEN: CPT | Mod: GN

## 2025-08-07 ASSESSMENT — PAIN - FUNCTIONAL ASSESSMENT: PAIN_FUNCTIONAL_ASSESSMENT: 0-10

## 2025-08-07 ASSESSMENT — PATIENT HEALTH QUESTIONNAIRE - PHQ9
2. FEELING DOWN, DEPRESSED OR HOPELESS: MORE THAN HALF THE DAYS
SUM OF ALL RESPONSES TO PHQ9 QUESTIONS 1 AND 2: 3
1. LITTLE INTEREST OR PLEASURE IN DOING THINGS: SEVERAL DAYS

## 2025-08-07 ASSESSMENT — ENCOUNTER SYMPTOMS
OCCASIONAL FEELINGS OF UNSTEADINESS: 1
LOSS OF SENSATION IN FEET: 1
DEPRESSION: 1

## 2025-08-07 ASSESSMENT — PAIN SCALES - GENERAL: PAINLEVEL_OUTOF10: 0 - NO PAIN

## 2025-08-07 NOTE — PROGRESS NOTES
Outpatient Speech-Language Pathology Speech-Language and Cognition Evaluation     Patient Name: Lenora Ames  MRN: [id number]  : 1955  Patient Room: Room/bed info not found  Today's Date: 25     Time Calculation  Start Time: 1400  Stop Time: 1445  Time Calculation (min): 45 min    Assessment:  Completed formal evaluation of cognitive linguistic skills via the  [facility] Mental Status Examination (SLUMS) (see results in Objective Measures section). No dysarthria/dysphonia noted. Pt demonstrated moderate-severe cognitive-linguistic deficits in the following domains: verbal expression (word finding, thought organization); auditory comprehension (multi-step instructions; paragraph comprehension); memory (working memory; short term memory); executive function (numerical reasoning; problem solving). Noted severe deficits on the Clock Drawing Test, indicated elevated fall risk.     Pt would benefit from skilled ST services in order to target previously mentioned deficits, and to provide training/instructions to pt regarding compensatory strategies. Without skilled ST services, pt is at risk for continued cognitive linguistic deficits preventing facilitation of pt independence, and increase in caregiver/financial burden.   Prognosis: Fair  Treatment Provided: No  Strengths: Motivation, Family/Caregiver Support  Barriers: None  Education Provided: Yes     Plan:   Plan  Inpatient/Swing Bed or Outpatient: Outpatient  SLP TX Plan: Continue Plan of Care  SLP Plan: Skilled SLP  SLP Frequency: 1x per week  Duration: 10 visits  Discussed POC: Patient  Discussed Risks/Benefits: Patient  Patient/Caregiver Agreeable: Yes  SLP - OK to Discharge: No    Goals:   Long Term Goals:   Pt will demonstrate improved verbal expression skills (MILD to WFL) in order to participate in complex conversation in variety of social settings as evidenced by improved score on SLUMS.   GOAL START: 2025  TIMEFRAME: 6  Months  Re-Eval date: 2/3/2026   Status: Goal initiated   Progress this date: n/a     Pt will demonstrate improved auditory comprehension skills  (MOD to WFL)  as evidenced by improved score on SLUMS in order to increase fx communication skills and participation in social settings.   GOAL START: 8/7/2025  TIMEFRAME: 6 Months  Re-Eval date: 2/3/2026   Status: Goal initiated   Progress this date: n/a     Pt will demonstrate improved memory skills  (SEV to MILD) as evidenced by improved score on SLUMS in order to increase pt completion of IADLS and learning of new information.   GOAL START: 8/7/2025  TIMEFRAME: 6 Months  Re-Eval date: 2/3/2026   Status: Goal initiated   Progress this date: n/a    Pt will demonstrate improved executive functioning skills  (SEV to MILD)  as evidenced by improved score on SLUMS in order to increase pt independence in completion of ADLs and decrease caregiver burden.   GOAL START: 8/7/2025  TIMEFRAME: 6 Months  Re-Eval date: 2/3/2026   Status: Goal initiated   Progress this date: n/a     Short Term Goals:   Pt will complete simple/complex word finding tasks with 90% acc in order to improve verbal expression.   GOAL START: 8/7/2025  TIMEFRAME: 3 Months  Re-Eval date: 11/5/2025   Status: Goal initiated   Progress this date: n/a     Pt will complete complex auditory comprehension tasks (multi-step directions, number ID, etc) with 85% accuracy with min verbal cues in order to improve fx communication in social settings in 5 visits.     GOAL START: 8/7/2025  TIMEFRAME: 3 Months  Re-Eval date: 11/5/2025   Status: Goal initiated   Progress this date: n/a     Pt will completed reading comprehension tasks with 90% accuracy independently in order to participate in social settings and facilitate independence in desired ADL tasks.    GOAL START: 8/7/2025  TIMEFRAME: 3 Months  Re-Eval date: 11/5/2025   Status: Goal initiated   Progress this date: n/a     Pt will complete tasks targeting memory skills  including sustained/alternating attention, working memory, thought organization, and short-term memory with 85% acc (I) in order to improve encoding of newly learned information, promote independence, and return to PLOF.   GOAL START: 8/7/2025  TIMEFRAME: 3 Months  Re-Eval date: 11/5/2025   Status: Goal initiated   Progress this date: n/a     Pt will complete tasks targeting executive functioning skills including (sustained/alternating attention, analysis/planning, speed of processing) with 90% acc independently in order to increase safety during completion of ADLs.    GOAL START: 8/7/2025  TIMEFRAME: 3 Months  Re-Eval date: 11/5/2025   Status: Goal initiated   Progress this date: n/a       SLP Outcome Measures:   Cognitive-linguistic skills assessment with [facility] Mental Status Examination (UMS). Results are as follows:  TOTAL SCORE - 12/30 (WFL 27-30)  Orientation - 1/3  Numerical calculation - 1/3  Divergent Naming - 1/3 (8/15)  Recall of words -  3/5  Working memory - 0/2  Clock Drawing Test - 0/4  Auditory Comprehension - 2/2  Recall of Paragraph - 0/8      General Information:  Chief Complaint: Pt is a 71 yo female [facility] for initial evaluation with this SLP. Pt referred for skilled ST evaluation by Dr. Harris with complaints of difficulty reading, writing, speech intelligibility, and numerical comprehension following CVA from 2022. Pt reports that she no longer drives and receives assistance with financial management tasks from her daughter. Her son lives with her and offers assistance as needed. Pt previously received skilled ST services at this facility however she reports she failed to schedule additional follow-up appointments as she was frustrated with the progress she was making. Pt reports she has noted limited improvement in her performance during ADLs since initial stroke, however feels it is still difficulty to functional normally.   Pt Status: A/O x 2  Cognitive  Status: Answers yes/no questions appropriately and Follows 1-2 steps commands accurately  Respiratory Status: Room air  Pt positioning: Upright in chair   Pt's goal for ST:  SLP Received On: 08/07/25  General Information  Chart Reviewed: Yes  Arrival: Accompanied by: Daughter, remained in waiting room   Reason for Referral: Speech and language deficits s/p CVA in Feb 2022  Referred By: Faraz Ortega MD  Past Medical History Relevant to Rehab: CVA, DMII, COPD, anxiety, depression, recurrent falls  Certification Period Start Date: 08/07/25  Certification Period End Date: 08/07/26  Number of Authorized Treatments : 1  Total Number of Visits : 1     Payor authorization information:  Payor: MEDICARE / Plan: MEDICARE PART A AND B / Product Type: *No Product type* /   Certification Period Start Date: 08/07/25  Certification Period End Date: 08/07/26  Number of Authorized Treatments : 1  Total Number of Visits : 1    Pain:   Pain Assessment: 0-10  Pain Assessment  Pain Assessment: 0-10  0-10 (Numeric) Pain Score: 0 - No pain    Treatment:  Treatment provided: No    Education:   Outpatient Education:  Learner Patient   Barriers to Learning Cognitive limitations   Method Verbal   Education - Topic ST provided patient caregiver education regarding role of ST, purpose of assessment, clinical impressions, goals of treatment, and plan of care.     Outcome    Verbalized understanding, Verbalized agreement, Education Needs: , Continued instruction, Review/reinforcement, Education Goals: , and Partially meets

## 2025-08-18 DIAGNOSIS — E11.649 UNCONTROLLED TYPE 2 DIABETES MELLITUS WITH HYPOGLYCEMIA WITHOUT COMA: ICD-10-CM

## 2025-08-18 RX ORDER — INSULIN GLARGINE 100 [IU]/ML
30 INJECTION, SOLUTION SUBCUTANEOUS NIGHTLY
Qty: 15 ML | Refills: 0 | Status: SHIPPED | OUTPATIENT
Start: 2025-08-18

## 2025-08-21 ENCOUNTER — TREATMENT (OUTPATIENT)
Dept: SPEECH THERAPY | Facility: HOSPITAL | Age: 70
End: 2025-08-21
Payer: MEDICARE

## 2025-08-21 ENCOUNTER — DOCUMENTATION (OUTPATIENT)
Dept: OCCUPATIONAL THERAPY | Facility: HOSPITAL | Age: 70
End: 2025-08-21
Payer: MEDICARE

## 2025-08-21 ENCOUNTER — DOCUMENTATION (OUTPATIENT)
Dept: SPEECH THERAPY | Facility: HOSPITAL | Age: 70
End: 2025-08-21
Payer: MEDICARE

## 2025-08-21 DIAGNOSIS — R41.89 COGNITIVE IMPAIRMENT: ICD-10-CM

## 2025-08-21 DIAGNOSIS — R47.01 APHASIA: ICD-10-CM

## 2025-08-25 ENCOUNTER — APPOINTMENT (OUTPATIENT)
Dept: RADIOLOGY | Facility: HOSPITAL | Age: 70
End: 2025-08-25
Payer: MEDICARE

## 2025-08-25 ENCOUNTER — HOSPITAL ENCOUNTER (INPATIENT)
Facility: HOSPITAL | Age: 70
End: 2025-08-25
Attending: EMERGENCY MEDICINE | Admitting: INTERNAL MEDICINE
Payer: MEDICARE

## 2025-08-25 PROBLEM — L08.9 DIABETIC FOOT INFECTION (MULTI): Status: ACTIVE | Noted: 2025-08-25

## 2025-08-25 PROBLEM — E11.628 DIABETIC FOOT INFECTION (MULTI): Status: ACTIVE | Noted: 2025-08-25

## 2025-08-25 PROCEDURE — 73630 X-RAY EXAM OF FOOT: CPT | Mod: RT

## 2025-08-25 SDOH — SOCIAL STABILITY: SOCIAL INSECURITY: ARE YOU OR HAVE YOU BEEN THREATENED OR ABUSED PHYSICALLY, EMOTIONALLY, OR SEXUALLY BY ANYONE?: NO

## 2025-08-25 SDOH — SOCIAL STABILITY: SOCIAL NETWORK
DO YOU BELONG TO ANY CLUBS OR ORGANIZATIONS SUCH AS CHURCH GROUPS, UNIONS, FRATERNAL OR ATHLETIC GROUPS, OR SCHOOL GROUPS?: NO

## 2025-08-25 SDOH — HEALTH STABILITY: PHYSICAL HEALTH: ON AVERAGE, HOW MANY MINUTES DO YOU ENGAGE IN EXERCISE AT THIS LEVEL?: 0 MIN

## 2025-08-25 SDOH — ECONOMIC STABILITY: FOOD INSECURITY: HOW HARD IS IT FOR YOU TO PAY FOR THE VERY BASICS LIKE FOOD, HOUSING, MEDICAL CARE, AND HEATING?: NOT VERY HARD

## 2025-08-25 SDOH — SOCIAL STABILITY: SOCIAL NETWORK: HOW OFTEN DO YOU ATTEND CHURCH OR RELIGIOUS SERVICES?: 1 TO 4 TIMES PER YEAR

## 2025-08-25 SDOH — SOCIAL STABILITY: SOCIAL INSECURITY: WITHIN THE LAST YEAR, HAVE YOU BEEN HUMILIATED OR EMOTIONALLY ABUSED IN OTHER WAYS BY YOUR PARTNER OR EX-PARTNER?: NO

## 2025-08-25 SDOH — HEALTH STABILITY: MENTAL HEALTH: HOW MANY DRINKS CONTAINING ALCOHOL DO YOU HAVE ON A TYPICAL DAY WHEN YOU ARE DRINKING?: PATIENT DOES NOT DRINK

## 2025-08-25 SDOH — SOCIAL STABILITY: SOCIAL NETWORK: HOW OFTEN DO YOU GET TOGETHER WITH FRIENDS OR RELATIVES?: THREE TIMES A WEEK

## 2025-08-25 SDOH — SOCIAL STABILITY: SOCIAL INSECURITY: HAS ANYONE EVER THREATENED TO HURT YOUR FAMILY OR YOUR PETS?: NO

## 2025-08-25 SDOH — HEALTH STABILITY: PHYSICAL HEALTH
HOW OFTEN DO YOU NEED TO HAVE SOMEONE HELP YOU WHEN YOU READ INSTRUCTIONS, PAMPHLETS, OR OTHER WRITTEN MATERIAL FROM YOUR DOCTOR OR PHARMACY?: NEVER

## 2025-08-25 SDOH — HEALTH STABILITY: MENTAL HEALTH
DO YOU FEEL STRESS - TENSE, RESTLESS, NERVOUS, OR ANXIOUS, OR UNABLE TO SLEEP AT NIGHT BECAUSE YOUR MIND IS TROUBLED ALL THE TIME - THESE DAYS?: ONLY A LITTLE

## 2025-08-25 SDOH — HEALTH STABILITY: PHYSICAL HEALTH: ON AVERAGE, HOW MANY DAYS PER WEEK DO YOU ENGAGE IN MODERATE TO STRENUOUS EXERCISE (LIKE A BRISK WALK)?: 0 DAYS

## 2025-08-25 SDOH — HEALTH STABILITY: MENTAL HEALTH: HOW OFTEN DO YOU HAVE A DRINK CONTAINING ALCOHOL?: NEVER

## 2025-08-25 SDOH — SOCIAL STABILITY: SOCIAL NETWORK
IN A TYPICAL WEEK, HOW MANY TIMES DO YOU TALK ON THE PHONE WITH FAMILY, FRIENDS, OR NEIGHBORS?: MORE THAN THREE TIMES A WEEK

## 2025-08-25 SDOH — ECONOMIC STABILITY: HOUSING INSECURITY: AT ANY TIME IN THE PAST 12 MONTHS, WERE YOU HOMELESS OR LIVING IN A SHELTER (INCLUDING NOW)?: NO

## 2025-08-25 SDOH — SOCIAL STABILITY: SOCIAL NETWORK: HOW OFTEN DO YOU ATTEND MEETINGS OF THE CLUBS OR ORGANIZATIONS YOU BELONG TO?: 1 TO 4 TIMES PER YEAR

## 2025-08-25 SDOH — SOCIAL STABILITY: SOCIAL INSECURITY: DO YOU FEEL UNSAFE GOING BACK TO THE PLACE WHERE YOU ARE LIVING?: NO

## 2025-08-25 SDOH — ECONOMIC STABILITY: INCOME INSECURITY: IN THE PAST 12 MONTHS HAS THE ELECTRIC, GAS, OIL, OR WATER COMPANY THREATENED TO SHUT OFF SERVICES IN YOUR HOME?: NO

## 2025-08-25 SDOH — HEALTH STABILITY: MENTAL HEALTH: HOW OFTEN DO YOU HAVE SIX OR MORE DRINKS ON ONE OCCASION?: NEVER

## 2025-08-25 SDOH — ECONOMIC STABILITY: HOUSING INSECURITY: IN THE LAST 12 MONTHS, WAS THERE A TIME WHEN YOU WERE NOT ABLE TO PAY THE MORTGAGE OR RENT ON TIME?: NO

## 2025-08-25 SDOH — ECONOMIC STABILITY: FOOD INSECURITY: WITHIN THE PAST 12 MONTHS, YOU WORRIED THAT YOUR FOOD WOULD RUN OUT BEFORE YOU GOT THE MONEY TO BUY MORE.: NEVER TRUE

## 2025-08-25 SDOH — SOCIAL STABILITY: SOCIAL INSECURITY: WITHIN THE LAST YEAR, HAVE YOU BEEN AFRAID OF YOUR PARTNER OR EX-PARTNER?: NO

## 2025-08-25 SDOH — ECONOMIC STABILITY: HOUSING INSECURITY: IN THE PAST 12 MONTHS, HOW MANY TIMES HAVE YOU MOVED WHERE YOU WERE LIVING?: 0

## 2025-08-25 SDOH — SOCIAL STABILITY: SOCIAL INSECURITY: HAVE YOU HAD THOUGHTS OF HARMING ANYONE ELSE?: NO

## 2025-08-25 SDOH — ECONOMIC STABILITY: FOOD INSECURITY: WITHIN THE PAST 12 MONTHS, THE FOOD YOU BOUGHT JUST DIDN'T LAST AND YOU DIDN'T HAVE MONEY TO GET MORE.: NEVER TRUE

## 2025-08-25 SDOH — SOCIAL STABILITY: SOCIAL INSECURITY: ABUSE: ADULT

## 2025-08-25 SDOH — SOCIAL STABILITY: SOCIAL INSECURITY: DOES ANYONE TRY TO KEEP YOU FROM HAVING/CONTACTING OTHER FRIENDS OR DOING THINGS OUTSIDE YOUR HOME?: NO

## 2025-08-25 SDOH — ECONOMIC STABILITY: TRANSPORTATION INSECURITY: IN THE PAST 12 MONTHS, HAS LACK OF TRANSPORTATION KEPT YOU FROM MEDICAL APPOINTMENTS OR FROM GETTING MEDICATIONS?: NO

## 2025-08-25 SDOH — SOCIAL STABILITY: SOCIAL INSECURITY: DO YOU FEEL ANYONE HAS EXPLOITED OR TAKEN ADVANTAGE OF YOU FINANCIALLY OR OF YOUR PERSONAL PROPERTY?: NO

## 2025-08-25 SDOH — SOCIAL STABILITY: SOCIAL INSECURITY: ARE THERE ANY APPARENT SIGNS OF INJURIES/BEHAVIORS THAT COULD BE RELATED TO ABUSE/NEGLECT?: NO

## 2025-08-25 SDOH — SOCIAL STABILITY: SOCIAL INSECURITY: HAVE YOU HAD ANY THOUGHTS OF HARMING ANYONE ELSE?: NO

## 2025-08-25 ASSESSMENT — PAIN SCALES - GENERAL
PAINLEVEL_OUTOF10: 8
PAINLEVEL_OUTOF10: 3

## 2025-08-25 ASSESSMENT — COGNITIVE AND FUNCTIONAL STATUS - GENERAL
MOBILITY SCORE: 22
CLIMB 3 TO 5 STEPS WITH RAILING: A LITTLE
PATIENT BASELINE BEDBOUND: NO
WALKING IN HOSPITAL ROOM: A LITTLE
DAILY ACTIVITIY SCORE: 24

## 2025-08-25 ASSESSMENT — ACTIVITIES OF DAILY LIVING (ADL)
ADEQUATE_TO_COMPLETE_ADL: YES
TOILETING: INDEPENDENT
JUDGMENT_ADEQUATE_SAFELY_COMPLETE_DAILY_ACTIVITIES: YES
HEARING - RIGHT EAR: FUNCTIONAL
BATHING: INDEPENDENT
LACK_OF_TRANSPORTATION: NO
HEARING - LEFT EAR: FUNCTIONAL
FEEDING YOURSELF: INDEPENDENT
LACK_OF_TRANSPORTATION: NO
PATIENT'S MEMORY ADEQUATE TO SAFELY COMPLETE DAILY ACTIVITIES?: YES
DRESSING YOURSELF: INDEPENDENT
GROOMING: INDEPENDENT
WALKS IN HOME: INDEPENDENT

## 2025-08-25 ASSESSMENT — PAIN DESCRIPTION - DESCRIPTORS: DESCRIPTORS: DISCOMFORT

## 2025-08-25 ASSESSMENT — LIFESTYLE VARIABLES
AUDIT-C TOTAL SCORE: 0
HOW MANY STANDARD DRINKS CONTAINING ALCOHOL DO YOU HAVE ON A TYPICAL DAY: PATIENT DOES NOT DRINK
HOW OFTEN DO YOU HAVE A DRINK CONTAINING ALCOHOL: NEVER
AUDIT-C TOTAL SCORE: 0
SKIP TO QUESTIONS 9-10: 1
AUDIT-C TOTAL SCORE: 0
SKIP TO QUESTIONS 9-10: 1
SUBSTANCE_ABUSE_PAST_12_MONTHS: NO
PRESCIPTION_ABUSE_PAST_12_MONTHS: NO
HOW OFTEN DO YOU HAVE 6 OR MORE DRINKS ON ONE OCCASION: NEVER

## 2025-08-25 ASSESSMENT — PAIN - FUNCTIONAL ASSESSMENT: PAIN_FUNCTIONAL_ASSESSMENT: 0-10

## 2025-08-25 ASSESSMENT — PATIENT HEALTH QUESTIONNAIRE - PHQ9
2. FEELING DOWN, DEPRESSED OR HOPELESS: NOT AT ALL
SUM OF ALL RESPONSES TO PHQ9 QUESTIONS 1 & 2: 0
1. LITTLE INTEREST OR PLEASURE IN DOING THINGS: NOT AT ALL

## 2025-08-25 ASSESSMENT — PAIN DESCRIPTION - PAIN TYPE: TYPE: ACUTE PAIN

## 2025-08-25 ASSESSMENT — PAIN DESCRIPTION - LOCATION: LOCATION: FOOT

## 2025-08-25 ASSESSMENT — PAIN DESCRIPTION - ORIENTATION: ORIENTATION: RIGHT

## 2025-08-26 ENCOUNTER — APPOINTMENT (OUTPATIENT)
Dept: RADIOLOGY | Facility: HOSPITAL | Age: 70
End: 2025-08-26
Payer: MEDICARE

## 2025-08-26 PROBLEM — L97.512 DIABETIC ULCER OF TOE OF RIGHT FOOT ASSOCIATED WITH TYPE 2 DIABETES MELLITUS, WITH FAT LAYER EXPOSED: Status: ACTIVE | Noted: 2025-08-26

## 2025-08-26 PROBLEM — E11.621 DIABETIC ULCER OF TOE OF RIGHT FOOT ASSOCIATED WITH TYPE 2 DIABETES MELLITUS, WITH FAT LAYER EXPOSED: Status: ACTIVE | Noted: 2025-08-26

## 2025-08-26 PROCEDURE — 73720 MRI LWR EXTREMITY W/O&W/DYE: CPT | Mod: RT

## 2025-08-26 ASSESSMENT — COGNITIVE AND FUNCTIONAL STATUS - GENERAL
CLIMB 3 TO 5 STEPS WITH RAILING: A LOT
TOILETING: A LITTLE
MOVING FROM LYING ON BACK TO SITTING ON SIDE OF FLAT BED WITH BEDRAILS: A LITTLE
MOVING TO AND FROM BED TO CHAIR: A LITTLE
MOBILITY SCORE: 17
EATING MEALS: A LITTLE
STANDING UP FROM CHAIR USING ARMS: A LITTLE
HELP NEEDED FOR BATHING: A LITTLE
WALKING IN HOSPITAL ROOM: A LITTLE
STANDING UP FROM CHAIR USING ARMS: A LITTLE
DRESSING REGULAR LOWER BODY CLOTHING: A LITTLE
MOBILITY SCORE: 17
MOVING TO AND FROM BED TO CHAIR: A LITTLE
DRESSING REGULAR UPPER BODY CLOTHING: A LITTLE
MOVING FROM LYING ON BACK TO SITTING ON SIDE OF FLAT BED WITH BEDRAILS: A LITTLE
CLIMB 3 TO 5 STEPS WITH RAILING: A LOT
DAILY ACTIVITIY SCORE: 18
WALKING IN HOSPITAL ROOM: A LITTLE
TURNING FROM BACK TO SIDE WHILE IN FLAT BAD: A LITTLE
PERSONAL GROOMING: A LITTLE
TURNING FROM BACK TO SIDE WHILE IN FLAT BAD: A LITTLE

## 2025-08-26 ASSESSMENT — ENCOUNTER SYMPTOMS
NEUROLOGICAL NEGATIVE: 1
ALLERGIC/IMMUNOLOGIC NEGATIVE: 1
ENDOCRINE NEGATIVE: 1
CONSTITUTIONAL NEGATIVE: 1
MUSCULOSKELETAL NEGATIVE: 1
RESPIRATORY NEGATIVE: 1
HEMATOLOGIC/LYMPHATIC NEGATIVE: 1
MYALGIAS: 1
COLOR CHANGE: 1
GASTROINTESTINAL NEGATIVE: 1
PSYCHIATRIC NEGATIVE: 1
CARDIOVASCULAR NEGATIVE: 1
WOUND: 1
EYES NEGATIVE: 1

## 2025-08-26 ASSESSMENT — PAIN DESCRIPTION - LOCATION
LOCATION: FOOT
LOCATION: FOOT

## 2025-08-26 ASSESSMENT — PAIN DESCRIPTION - ORIENTATION
ORIENTATION: RIGHT
ORIENTATION: RIGHT

## 2025-08-26 ASSESSMENT — ACTIVITIES OF DAILY LIVING (ADL): LACK_OF_TRANSPORTATION: NO

## 2025-08-26 ASSESSMENT — PAIN SCALES - GENERAL
PAINLEVEL_OUTOF10: 8
PAINLEVEL_OUTOF10: 5 - MODERATE PAIN
PAINLEVEL_OUTOF10: 10 - WORST POSSIBLE PAIN
PAINLEVEL_OUTOF10: 3
PAINLEVEL_OUTOF10: 10 - WORST POSSIBLE PAIN
PAINLEVEL_OUTOF10: 7

## 2025-08-26 ASSESSMENT — PAIN - FUNCTIONAL ASSESSMENT
PAIN_FUNCTIONAL_ASSESSMENT: 0-10

## 2025-08-27 ENCOUNTER — APPOINTMENT (OUTPATIENT)
Dept: OPHTHALMOLOGY | Facility: CLINIC | Age: 70
End: 2025-08-27
Payer: COMMERCIAL

## 2025-08-27 PROBLEM — L02.611 CUTANEOUS ABSCESS OF RIGHT FOOT: Status: ACTIVE | Noted: 2025-08-25

## 2025-08-27 ASSESSMENT — ACTIVITIES OF DAILY LIVING (ADL)
BATHING_ASSISTANCE: MINIMAL
HOME_MANAGEMENT_TIME_ENTRY: 15
ADL_ASSISTANCE: INDEPENDENT
BATHING_LEVEL_OF_ASSISTANCE: SETUP

## 2025-08-27 ASSESSMENT — PAIN - FUNCTIONAL ASSESSMENT
PAIN_FUNCTIONAL_ASSESSMENT: 0-10

## 2025-08-27 ASSESSMENT — PAIN DESCRIPTION - LOCATION
LOCATION: FOOT

## 2025-08-27 ASSESSMENT — PAIN SCALES - GENERAL
PAINLEVEL_OUTOF10: 0 - NO PAIN
PAINLEVEL_OUTOF10: 7
PAINLEVEL_OUTOF10: 7
PAINLEVEL_OUTOF10: 8
PAINLEVEL_OUTOF10: 7
PAINLEVEL_OUTOF10: 8
PAINLEVEL_OUTOF10: 0 - NO PAIN
PAINLEVEL_OUTOF10: 4
PAINLEVEL_OUTOF10: 7
PAINLEVEL_OUTOF10: 6
PAINLEVEL_OUTOF10: 10 - WORST POSSIBLE PAIN

## 2025-08-27 ASSESSMENT — COGNITIVE AND FUNCTIONAL STATUS - GENERAL
DAILY ACTIVITIY SCORE: 18
STANDING UP FROM CHAIR USING ARMS: A LITTLE
MOVING TO AND FROM BED TO CHAIR: A LITTLE
PERSONAL GROOMING: A LITTLE
DRESSING REGULAR UPPER BODY CLOTHING: A LITTLE
DRESSING REGULAR LOWER BODY CLOTHING: A LITTLE
TURNING FROM BACK TO SIDE WHILE IN FLAT BAD: A LITTLE
WALKING IN HOSPITAL ROOM: A LITTLE
HELP NEEDED FOR BATHING: A LITTLE
MOBILITY SCORE: 17
TOILETING: A LITTLE
HELP NEEDED FOR BATHING: A LITTLE
EATING MEALS: A LITTLE
TOILETING: A LITTLE
MOVING FROM LYING ON BACK TO SITTING ON SIDE OF FLAT BED WITH BEDRAILS: A LITTLE
CLIMB 3 TO 5 STEPS WITH RAILING: A LOT
DAILY ACTIVITIY SCORE: 21
DRESSING REGULAR LOWER BODY CLOTHING: A LITTLE

## 2025-08-27 ASSESSMENT — PAIN DESCRIPTION - DESCRIPTORS: DESCRIPTORS: THROBBING

## 2025-08-27 ASSESSMENT — PAIN DESCRIPTION - ORIENTATION
ORIENTATION: RIGHT

## 2025-08-28 ENCOUNTER — SURGERY (OUTPATIENT)
Age: 70
End: 2025-08-28
Payer: MEDICARE

## 2025-08-28 ENCOUNTER — ANESTHESIA (OUTPATIENT)
Dept: OPERATING ROOM | Facility: HOSPITAL | Age: 70
End: 2025-08-28
Payer: MEDICARE

## 2025-08-28 ENCOUNTER — ANESTHESIA EVENT (OUTPATIENT)
Dept: OPERATING ROOM | Facility: HOSPITAL | Age: 70
End: 2025-08-28
Payer: MEDICARE

## 2025-08-28 PROCEDURE — 2500000004 HC RX 250 GENERAL PHARMACY W/ HCPCS (ALT 636 FOR OP/ED): Mod: IPSPLIT | Performed by: HOSPITALIST

## 2025-08-28 PROCEDURE — 2500000004 HC RX 250 GENERAL PHARMACY W/ HCPCS (ALT 636 FOR OP/ED): Mod: IPSPLIT | Performed by: NURSE ANESTHETIST, CERTIFIED REGISTERED

## 2025-08-28 RX ORDER — PROPOFOL 10 MG/ML
INJECTION, EMULSION INTRAVENOUS AS NEEDED
Status: DISCONTINUED | OUTPATIENT
Start: 2025-08-28 | End: 2025-08-28

## 2025-08-28 RX ORDER — BUPIVACAINE HYDROCHLORIDE 5 MG/ML
INJECTION, SOLUTION EPIDURAL; INTRACAUDAL; PERINEURAL AS NEEDED
Status: DISCONTINUED | OUTPATIENT
Start: 2025-08-28 | End: 2025-08-28

## 2025-08-28 RX ORDER — KETAMINE HYDROCHLORIDE 100 MG/ML
INJECTION, SOLUTION INTRAMUSCULAR; INTRAVENOUS AS NEEDED
Status: DISCONTINUED | OUTPATIENT
Start: 2025-08-28 | End: 2025-08-28

## 2025-08-28 RX ORDER — MIDAZOLAM HYDROCHLORIDE 2 MG/2ML
INJECTION, SOLUTION INTRAMUSCULAR; INTRAVENOUS AS NEEDED
Status: DISCONTINUED | OUTPATIENT
Start: 2025-08-28 | End: 2025-08-28

## 2025-08-28 RX ORDER — FENTANYL CITRATE 50 UG/ML
INJECTION, SOLUTION INTRAMUSCULAR; INTRAVENOUS AS NEEDED
Status: DISCONTINUED | OUTPATIENT
Start: 2025-08-28 | End: 2025-08-28

## 2025-08-28 RX ADMIN — PROPOFOL 50 MG: 10 INJECTION, EMULSION INTRAVENOUS at 14:55

## 2025-08-28 RX ADMIN — KETAMINE HYDROCHLORIDE 15 MG: 100 INJECTION, SOLUTION, CONCENTRATE INTRAMUSCULAR; INTRAVENOUS at 14:57

## 2025-08-28 RX ADMIN — LIDOCAINE HYDROCHLORIDE 20 ML: 10 INJECTION, SOLUTION INFILTRATION; PERINEURAL at 14:54

## 2025-08-28 RX ADMIN — PROPOFOL 50 MG: 10 INJECTION, EMULSION INTRAVENOUS at 15:12

## 2025-08-28 RX ADMIN — PROPOFOL 20 MG: 10 INJECTION, EMULSION INTRAVENOUS at 15:05

## 2025-08-28 RX ADMIN — PROPOFOL 50 MG: 10 INJECTION, EMULSION INTRAVENOUS at 15:17

## 2025-08-28 RX ADMIN — PROPOFOL 50 MG: 10 INJECTION, EMULSION INTRAVENOUS at 15:32

## 2025-08-28 RX ADMIN — ONDANSETRON 4 MG: 2 INJECTION, SOLUTION INTRAMUSCULAR; INTRAVENOUS at 15:09

## 2025-08-28 RX ADMIN — BUPIVACAINE HYDROCHLORIDE 75 MG: 5 INJECTION, SOLUTION EPIDURAL; INTRACAUDAL; PERINEURAL at 14:55

## 2025-08-28 RX ADMIN — PROPOFOL 50 MG: 10 INJECTION, EMULSION INTRAVENOUS at 15:40

## 2025-08-28 RX ADMIN — TAZOBACTAM SODIUM AND PIPERACILLIN SODIUM 3.38 G: 375; 3 INJECTION, SOLUTION INTRAVENOUS at 14:59

## 2025-08-28 RX ADMIN — FENTANYL CITRATE 50 MCG: 50 INJECTION, SOLUTION INTRAMUSCULAR; INTRAVENOUS at 14:59

## 2025-08-28 RX ADMIN — MIDAZOLAM HYDROCHLORIDE 2 MG: 1 INJECTION, SOLUTION INTRAMUSCULAR; INTRAVENOUS at 14:50

## 2025-08-28 RX ADMIN — FENTANYL CITRATE 50 MCG: 50 INJECTION, SOLUTION INTRAMUSCULAR; INTRAVENOUS at 14:54

## 2025-08-28 RX ADMIN — PROPOFOL 50 MG: 10 INJECTION, EMULSION INTRAVENOUS at 15:26

## 2025-08-28 RX ADMIN — PROPOFOL 30 MG: 10 INJECTION, EMULSION INTRAVENOUS at 15:00

## 2025-08-28 RX ADMIN — PROPOFOL 50 MG: 10 INJECTION, EMULSION INTRAVENOUS at 15:47

## 2025-08-28 SDOH — HEALTH STABILITY: MENTAL HEALTH: CURRENT SMOKER: 1

## 2025-08-28 ASSESSMENT — COGNITIVE AND FUNCTIONAL STATUS - GENERAL
CLIMB 3 TO 5 STEPS WITH RAILING: A LITTLE
HELP NEEDED FOR BATHING: A LITTLE
MOBILITY SCORE: 20
WALKING IN HOSPITAL ROOM: A LITTLE
DAILY ACTIVITIY SCORE: 18
EATING MEALS: A LITTLE
STANDING UP FROM CHAIR USING ARMS: A LITTLE
TOILETING: A LITTLE
PERSONAL GROOMING: A LITTLE
DRESSING REGULAR LOWER BODY CLOTHING: A LITTLE
DRESSING REGULAR UPPER BODY CLOTHING: A LITTLE
MOVING TO AND FROM BED TO CHAIR: A LITTLE

## 2025-08-28 ASSESSMENT — PAIN SCALES - GENERAL
PAINLEVEL_OUTOF10: 0 - NO PAIN
PAINLEVEL_OUTOF10: 3
PAINLEVEL_OUTOF10: 0 - NO PAIN
PAINLEVEL_OUTOF10: 0 - NO PAIN
PAINLEVEL_OUTOF10: 4
PAINLEVEL_OUTOF10: 0 - NO PAIN
PAINLEVEL_OUTOF10: 3
PAINLEVEL_OUTOF10: 3

## 2025-08-28 ASSESSMENT — PAIN - FUNCTIONAL ASSESSMENT
PAIN_FUNCTIONAL_ASSESSMENT: 0-10

## 2025-08-28 ASSESSMENT — PAIN DESCRIPTION - ORIENTATION: ORIENTATION: RIGHT

## 2025-08-28 ASSESSMENT — PAIN DESCRIPTION - LOCATION: LOCATION: FOOT

## 2025-08-29 ASSESSMENT — COGNITIVE AND FUNCTIONAL STATUS - GENERAL
HELP NEEDED FOR BATHING: A LITTLE
WALKING IN HOSPITAL ROOM: A LITTLE
TOILETING: A LITTLE
MOVING TO AND FROM BED TO CHAIR: A LITTLE
DRESSING REGULAR LOWER BODY CLOTHING: A LITTLE
MOBILITY SCORE: 20
DAILY ACTIVITIY SCORE: 18
MOBILITY SCORE: 16
EATING MEALS: A LITTLE
TURNING FROM BACK TO SIDE WHILE IN FLAT BAD: A LITTLE
DRESSING REGULAR LOWER BODY CLOTHING: A LITTLE
MOVING TO AND FROM BED TO CHAIR: A LITTLE
STANDING UP FROM CHAIR USING ARMS: A LITTLE
TOILETING: A LITTLE
WALKING IN HOSPITAL ROOM: A LOT
CLIMB 3 TO 5 STEPS WITH RAILING: A LITTLE
HELP NEEDED FOR BATHING: A LITTLE
DRESSING REGULAR UPPER BODY CLOTHING: A LITTLE
STANDING UP FROM CHAIR USING ARMS: A LITTLE
DAILY ACTIVITIY SCORE: 21
CLIMB 3 TO 5 STEPS WITH RAILING: TOTAL
PERSONAL GROOMING: A LITTLE

## 2025-08-29 ASSESSMENT — ACTIVITIES OF DAILY LIVING (ADL)
BATHING_LEVEL_OF_ASSISTANCE: SETUP;CLOSE SUPERVISION
HOME_MANAGEMENT_TIME_ENTRY: 8
BATHING_WHERE_ASSESSED: EDGE OF BED

## 2025-08-29 ASSESSMENT — PAIN DESCRIPTION - LOCATION
LOCATION: FOOT
LOCATION: FOOT

## 2025-08-29 ASSESSMENT — PAIN SCALES - GENERAL
PAINLEVEL_OUTOF10: 10 - WORST POSSIBLE PAIN
PAINLEVEL_OUTOF10: 0 - NO PAIN
PAINLEVEL_OUTOF10: 10 - WORST POSSIBLE PAIN
PAINLEVEL_OUTOF10: 8
PAINLEVEL_OUTOF10: 10 - WORST POSSIBLE PAIN
PAINLEVEL_OUTOF10: 10 - WORST POSSIBLE PAIN
PAINLEVEL_OUTOF10: 5 - MODERATE PAIN
PAINLEVEL_OUTOF10: 2

## 2025-08-29 ASSESSMENT — PAIN - FUNCTIONAL ASSESSMENT
PAIN_FUNCTIONAL_ASSESSMENT: 0-10

## 2025-08-29 ASSESSMENT — PAIN DESCRIPTION - ORIENTATION: ORIENTATION: RIGHT

## 2025-08-29 ASSESSMENT — PAIN DESCRIPTION - DESCRIPTORS
DESCRIPTORS: NAGGING
DESCRIPTORS: ACHING
DESCRIPTORS: ACHING;SORE

## 2025-08-30 ENCOUNTER — APPOINTMENT (OUTPATIENT)
Dept: RADIOLOGY | Facility: HOSPITAL | Age: 70
End: 2025-08-30
Payer: MEDICARE

## 2025-08-30 PROCEDURE — 71045 X-RAY EXAM CHEST 1 VIEW: CPT | Mod: IPSPLIT

## 2025-08-30 ASSESSMENT — PAIN DESCRIPTION - ORIENTATION: ORIENTATION: RIGHT

## 2025-08-30 ASSESSMENT — PAIN SCALES - GENERAL
PAINLEVEL_OUTOF10: 9
PAINLEVEL_OUTOF10: 1
PAINLEVEL_OUTOF10: 8
PAINLEVEL_OUTOF10: 3
PAINLEVEL_OUTOF10: 4
PAINLEVEL_OUTOF10: 8
PAINLEVEL_OUTOF10: 9
PAINLEVEL_OUTOF10: 2
PAINLEVEL_OUTOF10: 7
PAINLEVEL_OUTOF10: 8
PAINLEVEL_OUTOF10: 8

## 2025-08-30 ASSESSMENT — COGNITIVE AND FUNCTIONAL STATUS - GENERAL
PERSONAL GROOMING: A LITTLE
WALKING IN HOSPITAL ROOM: A LITTLE
CLIMB 3 TO 5 STEPS WITH RAILING: A LITTLE
DAILY ACTIVITIY SCORE: 18
EATING MEALS: A LITTLE
MOBILITY SCORE: 20
STANDING UP FROM CHAIR USING ARMS: A LITTLE
MOVING TO AND FROM BED TO CHAIR: A LITTLE
DRESSING REGULAR LOWER BODY CLOTHING: A LITTLE
TOILETING: A LITTLE
HELP NEEDED FOR BATHING: A LITTLE
DRESSING REGULAR UPPER BODY CLOTHING: A LITTLE

## 2025-08-30 ASSESSMENT — PAIN - FUNCTIONAL ASSESSMENT
PAIN_FUNCTIONAL_ASSESSMENT: 0-10

## 2025-08-30 ASSESSMENT — PAIN DESCRIPTION - LOCATION
LOCATION: FOOT

## 2025-08-31 ASSESSMENT — PAIN SCALES - GENERAL
PAINLEVEL_OUTOF10: 8
PAINLEVEL_OUTOF10: 4
PAINLEVEL_OUTOF10: 8
PAINLEVEL_OUTOF10: 5 - MODERATE PAIN
PAINLEVEL_OUTOF10: 6
PAINLEVEL_OUTOF10: 8
PAINLEVEL_OUTOF10: 3
PAINLEVEL_OUTOF10: 8
PAINLEVEL_OUTOF10: 3
PAINLEVEL_OUTOF10: 2
PAINLEVEL_OUTOF10: 8
PAINLEVEL_OUTOF10: 9
PAINLEVEL_OUTOF10: 8

## 2025-08-31 ASSESSMENT — PAIN - FUNCTIONAL ASSESSMENT
PAIN_FUNCTIONAL_ASSESSMENT: 0-10

## 2025-08-31 ASSESSMENT — PAIN DESCRIPTION - ORIENTATION
ORIENTATION: RIGHT

## 2025-08-31 ASSESSMENT — PAIN DESCRIPTION - DESCRIPTORS
DESCRIPTORS: ACHING;SHARP;SORE
DESCRIPTORS: ACHING;SHARP

## 2025-08-31 ASSESSMENT — PAIN DESCRIPTION - LOCATION
LOCATION: FOOT

## 2025-09-01 PROBLEM — L97.514: Status: ACTIVE | Noted: 2025-08-25

## 2025-09-01 PROBLEM — L02.611 ABSCESS OF RIGHT FOOT: Status: ACTIVE | Noted: 2025-08-25

## 2025-09-01 ASSESSMENT — PAIN DESCRIPTION - DESCRIPTORS
DESCRIPTORS: SHARP
DESCRIPTORS: ACHING;SORE;SHARP

## 2025-09-01 ASSESSMENT — PAIN DESCRIPTION - LOCATION
LOCATION: FOOT

## 2025-09-01 ASSESSMENT — PAIN SCALES - GENERAL
PAINLEVEL_OUTOF10: 3
PAINLEVEL_OUTOF10: 2
PAINLEVEL_OUTOF10: 5 - MODERATE PAIN
PAINLEVEL_OUTOF10: 10 - WORST POSSIBLE PAIN
PAINLEVEL_OUTOF10: 9
PAINLEVEL_OUTOF10: 4
PAINLEVEL_OUTOF10: 9
PAINLEVEL_OUTOF10: 6
PAINLEVEL_OUTOF10: 2
PAINLEVEL_OUTOF10: 8
PAINLEVEL_OUTOF10: 9
PAINLEVEL_OUTOF10: 10 - WORST POSSIBLE PAIN

## 2025-09-01 ASSESSMENT — PAIN - FUNCTIONAL ASSESSMENT
PAIN_FUNCTIONAL_ASSESSMENT: 0-10

## 2025-09-01 ASSESSMENT — PAIN DESCRIPTION - ORIENTATION
ORIENTATION: RIGHT

## 2025-09-02 ENCOUNTER — SURGERY (OUTPATIENT)
Age: 70
End: 2025-09-02
Payer: MEDICARE

## 2025-09-02 ENCOUNTER — ANESTHESIA (OUTPATIENT)
Dept: OPERATING ROOM | Facility: HOSPITAL | Age: 70
End: 2025-09-02
Payer: MEDICARE

## 2025-09-02 ENCOUNTER — ANESTHESIA EVENT (OUTPATIENT)
Dept: OPERATING ROOM | Facility: HOSPITAL | Age: 70
End: 2025-09-02
Payer: MEDICARE

## 2025-09-02 PROCEDURE — 2500000004 HC RX 250 GENERAL PHARMACY W/ HCPCS (ALT 636 FOR OP/ED): Mod: JW,IPSPLIT | Performed by: NURSE ANESTHETIST, CERTIFIED REGISTERED

## 2025-09-02 RX ORDER — PROPOFOL 10 MG/ML
INJECTION, EMULSION INTRAVENOUS AS NEEDED
Status: DISCONTINUED | OUTPATIENT
Start: 2025-09-02 | End: 2025-09-02

## 2025-09-02 RX ORDER — MIDAZOLAM HYDROCHLORIDE 2 MG/2ML
INJECTION, SOLUTION INTRAMUSCULAR; INTRAVENOUS AS NEEDED
Status: DISCONTINUED | OUTPATIENT
Start: 2025-09-02 | End: 2025-09-02

## 2025-09-02 RX ORDER — LIDOCAINE HYDROCHLORIDE 20 MG/ML
INJECTION, SOLUTION EPIDURAL; INFILTRATION; INTRACAUDAL; PERINEURAL AS NEEDED
Status: DISCONTINUED | OUTPATIENT
Start: 2025-09-02 | End: 2025-09-02

## 2025-09-02 RX ORDER — FENTANYL CITRATE 50 UG/ML
INJECTION, SOLUTION INTRAMUSCULAR; INTRAVENOUS AS NEEDED
Status: DISCONTINUED | OUTPATIENT
Start: 2025-09-02 | End: 2025-09-02

## 2025-09-02 RX ADMIN — PROPOFOL 20 MG: 10 INJECTION, EMULSION INTRAVENOUS at 10:42

## 2025-09-02 RX ADMIN — LIDOCAINE HYDROCHLORIDE 40 MG: 20 INJECTION, SOLUTION EPIDURAL; INFILTRATION; INTRACAUDAL; PERINEURAL at 10:26

## 2025-09-02 RX ADMIN — FENTANYL CITRATE 25 MCG: 50 INJECTION, SOLUTION INTRAMUSCULAR; INTRAVENOUS at 10:36

## 2025-09-02 RX ADMIN — PROPOFOL 20 MG: 10 INJECTION, EMULSION INTRAVENOUS at 10:58

## 2025-09-02 RX ADMIN — MIDAZOLAM HYDROCHLORIDE 2 MG: 1 INJECTION, SOLUTION INTRAMUSCULAR; INTRAVENOUS at 10:26

## 2025-09-02 RX ADMIN — PROPOFOL 20 MG: 10 INJECTION, EMULSION INTRAVENOUS at 10:46

## 2025-09-02 RX ADMIN — PROPOFOL 20 MG: 10 INJECTION, EMULSION INTRAVENOUS at 10:30

## 2025-09-02 RX ADMIN — PROPOFOL 20 MG: 10 INJECTION, EMULSION INTRAVENOUS at 10:36

## 2025-09-02 RX ADMIN — PROPOFOL 20 MG: 10 INJECTION, EMULSION INTRAVENOUS at 10:50

## 2025-09-02 RX ADMIN — SODIUM CHLORIDE 1000 ML: 900 IRRIGANT IRRIGATION at 10:46

## 2025-09-02 RX ADMIN — PROPOFOL 20 MG: 10 INJECTION, EMULSION INTRAVENOUS at 10:39

## 2025-09-02 RX ADMIN — PROPOFOL 80 MG: 10 INJECTION, EMULSION INTRAVENOUS at 10:26

## 2025-09-02 RX ADMIN — PROPOFOL 20 MG: 10 INJECTION, EMULSION INTRAVENOUS at 10:34

## 2025-09-02 RX ADMIN — PROPOFOL 20 MG: 10 INJECTION, EMULSION INTRAVENOUS at 10:32

## 2025-09-02 RX ADMIN — PROPOFOL 20 MG: 10 INJECTION, EMULSION INTRAVENOUS at 10:54

## 2025-09-02 RX ADMIN — PROPOFOL 20 MG: 10 INJECTION, EMULSION INTRAVENOUS at 11:02

## 2025-09-02 RX ADMIN — LIDOCAINE HYDROCHLORIDE 10 ML: 10 INJECTION, SOLUTION INFILTRATION; PERINEURAL at 10:38

## 2025-09-02 RX ADMIN — FENTANYL CITRATE 25 MCG: 50 INJECTION, SOLUTION INTRAMUSCULAR; INTRAVENOUS at 10:26

## 2025-09-02 SDOH — HEALTH STABILITY: MENTAL HEALTH: CURRENT SMOKER: 1

## 2025-09-02 ASSESSMENT — COGNITIVE AND FUNCTIONAL STATUS - GENERAL
TURNING FROM BACK TO SIDE WHILE IN FLAT BAD: A LOT
HELP NEEDED FOR BATHING: A LITTLE
CLIMB 3 TO 5 STEPS WITH RAILING: TOTAL
STANDING UP FROM CHAIR USING ARMS: A LOT
TURNING FROM BACK TO SIDE WHILE IN FLAT BAD: A LITTLE
PERSONAL GROOMING: A LITTLE
MOBILITY SCORE: 14
MOBILITY SCORE: 16
STANDING UP FROM CHAIR USING ARMS: A LITTLE
MOVING TO AND FROM BED TO CHAIR: A LITTLE
DRESSING REGULAR UPPER BODY CLOTHING: A LITTLE
DAILY ACTIVITIY SCORE: 17
EATING MEALS: A LITTLE
MOVING TO AND FROM BED TO CHAIR: A LOT
WALKING IN HOSPITAL ROOM: A LOT
TOILETING: A LOT
CLIMB 3 TO 5 STEPS WITH RAILING: A LOT
WALKING IN HOSPITAL ROOM: A LOT
DRESSING REGULAR LOWER BODY CLOTHING: A LITTLE

## 2025-09-02 ASSESSMENT — PAIN - FUNCTIONAL ASSESSMENT

## 2025-09-02 ASSESSMENT — PAIN SCALES - GENERAL
PAINLEVEL_OUTOF10: 7
PAINLEVEL_OUTOF10: 10 - WORST POSSIBLE PAIN
PAINLEVEL_OUTOF10: 0 - NO PAIN
PAINLEVEL_OUTOF10: 2
PAIN_LEVEL: 0
PAINLEVEL_OUTOF10: 0 - NO PAIN
PAINLEVEL_OUTOF10: 0 - NO PAIN
PAINLEVEL_OUTOF10: 10 - WORST POSSIBLE PAIN
PAINLEVEL_OUTOF10: 9
PAINLEVEL_OUTOF10: 0 - NO PAIN
PAINLEVEL_OUTOF10: 1
PAINLEVEL_OUTOF10: 5 - MODERATE PAIN
PAINLEVEL_OUTOF10: 4
PAINLEVEL_OUTOF10: 0 - NO PAIN

## 2025-09-02 ASSESSMENT — PAIN DESCRIPTION - ORIENTATION
ORIENTATION: RIGHT

## 2025-09-02 ASSESSMENT — PAIN DESCRIPTION - LOCATION
LOCATION: FOOT
LOCATION: FOOT
LOCATION: LEG
LOCATION: FOOT
LOCATION: FOOT

## 2025-09-02 ASSESSMENT — PAIN DESCRIPTION - DESCRIPTORS
DESCRIPTORS: SHARP
DESCRIPTORS: ACHING
DESCRIPTORS: ACHING;SHOOTING

## 2025-09-03 ENCOUNTER — APPOINTMENT (OUTPATIENT)
Dept: OPHTHALMOLOGY | Facility: CLINIC | Age: 70
End: 2025-09-03
Payer: MEDICARE

## 2025-09-03 ENCOUNTER — APPOINTMENT (OUTPATIENT)
Dept: RADIOLOGY | Facility: HOSPITAL | Age: 70
End: 2025-09-03
Payer: MEDICARE

## 2025-09-03 PROCEDURE — 71045 X-RAY EXAM CHEST 1 VIEW: CPT | Mod: IPSPLIT

## 2025-09-03 ASSESSMENT — PAIN DESCRIPTION - LOCATION
LOCATION: FOOT

## 2025-09-03 ASSESSMENT — COGNITIVE AND FUNCTIONAL STATUS - GENERAL
DRESSING REGULAR LOWER BODY CLOTHING: A LITTLE
TOILETING: A LOT
HELP NEEDED FOR BATHING: A LOT
STANDING UP FROM CHAIR USING ARMS: A LOT
MOVING TO AND FROM BED TO CHAIR: A LOT
DAILY ACTIVITIY SCORE: 19
TURNING FROM BACK TO SIDE WHILE IN FLAT BAD: A LOT
MOBILITY SCORE: 14
CLIMB 3 TO 5 STEPS WITH RAILING: A LOT
WALKING IN HOSPITAL ROOM: A LOT

## 2025-09-03 ASSESSMENT — PAIN DESCRIPTION - ORIENTATION
ORIENTATION: RIGHT

## 2025-09-03 ASSESSMENT — PAIN SCALES - GENERAL
PAINLEVEL_OUTOF10: 9
PAINLEVEL_OUTOF10: 8
PAINLEVEL_OUTOF10: 10 - WORST POSSIBLE PAIN
PAINLEVEL_OUTOF10: 9
PAINLEVEL_OUTOF10: 5 - MODERATE PAIN
PAINLEVEL_OUTOF10: 0 - NO PAIN
PAINLEVEL_OUTOF10: 8
PAINLEVEL_OUTOF10: 8
PAINLEVEL_OUTOF10: 5 - MODERATE PAIN
PAINLEVEL_OUTOF10: 0 - NO PAIN
PAINLEVEL_OUTOF10: 9
PAINLEVEL_OUTOF10: 0 - NO PAIN
PAINLEVEL_OUTOF10: 9

## 2025-09-04 ASSESSMENT — COGNITIVE AND FUNCTIONAL STATUS - GENERAL
WALKING IN HOSPITAL ROOM: A LOT
TOILETING: A LOT
CLIMB 3 TO 5 STEPS WITH RAILING: TOTAL
MOBILITY SCORE: 16
DAILY ACTIVITIY SCORE: 19
MOVING TO AND FROM BED TO CHAIR: A LITTLE
TURNING FROM BACK TO SIDE WHILE IN FLAT BAD: A LITTLE
DRESSING REGULAR LOWER BODY CLOTHING: A LOT
HELP NEEDED FOR BATHING: A LITTLE
STANDING UP FROM CHAIR USING ARMS: A LITTLE

## 2025-09-04 ASSESSMENT — PAIN DESCRIPTION - LOCATION
LOCATION: FOOT
LOCATION: LEG

## 2025-09-04 ASSESSMENT — PAIN SCALES - GENERAL
PAINLEVEL_OUTOF10: 10 - WORST POSSIBLE PAIN
PAINLEVEL_OUTOF10: 8
PAINLEVEL_OUTOF10: 0 - NO PAIN
PAINLEVEL_OUTOF10: 8
PAINLEVEL_OUTOF10: 7
PAINLEVEL_OUTOF10: 8
PAINLEVEL_OUTOF10: 5 - MODERATE PAIN
PAINLEVEL_OUTOF10: 0 - NO PAIN
PAINLEVEL_OUTOF10: 3
PAINLEVEL_OUTOF10: 8

## 2025-09-04 ASSESSMENT — PAIN - FUNCTIONAL ASSESSMENT
PAIN_FUNCTIONAL_ASSESSMENT: 0-10

## 2025-09-04 ASSESSMENT — PAIN DESCRIPTION - ORIENTATION: ORIENTATION: RIGHT

## 2025-09-04 ASSESSMENT — PAIN DESCRIPTION - DESCRIPTORS: DESCRIPTORS: SHOOTING

## 2025-09-04 ASSESSMENT — ACTIVITIES OF DAILY LIVING (ADL): HOME_MANAGEMENT_TIME_ENTRY: 23

## 2025-09-05 ASSESSMENT — PAIN SCALES - GENERAL
PAINLEVEL_OUTOF10: 8
PAINLEVEL_OUTOF10: 8
PAINLEVEL_OUTOF10: 1
PAINLEVEL_OUTOF10: 3
PAINLEVEL_OUTOF10: 8
PAINLEVEL_OUTOF10: 2
PAINLEVEL_OUTOF10: 9
PAINLEVEL_OUTOF10: 5 - MODERATE PAIN

## 2025-09-05 ASSESSMENT — COGNITIVE AND FUNCTIONAL STATUS - GENERAL
WALKING IN HOSPITAL ROOM: A LITTLE
TOILETING: A LITTLE
DRESSING REGULAR LOWER BODY CLOTHING: A LITTLE
PERSONAL GROOMING: A LITTLE
CLIMB 3 TO 5 STEPS WITH RAILING: TOTAL
HELP NEEDED FOR BATHING: A LITTLE
MOBILITY SCORE: 20
DRESSING REGULAR UPPER BODY CLOTHING: A LITTLE
DAILY ACTIVITIY SCORE: 19

## 2025-09-05 ASSESSMENT — PAIN - FUNCTIONAL ASSESSMENT
PAIN_FUNCTIONAL_ASSESSMENT: 0-10

## 2025-09-05 ASSESSMENT — PAIN DESCRIPTION - ORIENTATION
ORIENTATION: RIGHT

## 2025-09-05 ASSESSMENT — PAIN DESCRIPTION - LOCATION
LOCATION: LEG
LOCATION: FOOT

## 2025-09-05 ASSESSMENT — PAIN DESCRIPTION - DESCRIPTORS: DESCRIPTORS: THROBBING

## 2025-09-10 ENCOUNTER — APPOINTMENT (OUTPATIENT)
Dept: PODIATRY | Facility: CLINIC | Age: 70
End: 2025-09-10
Payer: MEDICARE

## 2025-10-01 ENCOUNTER — APPOINTMENT (OUTPATIENT)
Dept: OPHTHALMOLOGY | Facility: CLINIC | Age: 70
End: 2025-10-01
Payer: MEDICARE